# Patient Record
Sex: FEMALE | Race: WHITE | NOT HISPANIC OR LATINO | Employment: OTHER | URBAN - METROPOLITAN AREA
[De-identification: names, ages, dates, MRNs, and addresses within clinical notes are randomized per-mention and may not be internally consistent; named-entity substitution may affect disease eponyms.]

---

## 2023-03-01 LAB
ALANINE AMINOTRANSFERASE (SGPT) (U/L) IN SER/PLAS: 32 U/L (ref 7–45)
ALBUMIN (G/DL) IN SER/PLAS: 4.5 G/DL (ref 3.4–5)
ALKALINE PHOSPHATASE (U/L) IN SER/PLAS: 48 U/L (ref 33–136)
ANION GAP IN SER/PLAS: 15 MMOL/L (ref 10–20)
ASPARTATE AMINOTRANSFERASE (SGOT) (U/L) IN SER/PLAS: 20 U/L (ref 9–39)
BASOPHILS (10*3/UL) IN BLOOD BY AUTOMATED COUNT: 0.04 X10E9/L (ref 0–0.1)
BASOPHILS/100 LEUKOCYTES IN BLOOD BY AUTOMATED COUNT: 0.5 % (ref 0–2)
BILIRUBIN TOTAL (MG/DL) IN SER/PLAS: 0.5 MG/DL (ref 0–1.2)
CALCIDIOL (25 OH VITAMIN D3) (NG/ML) IN SER/PLAS: 83 NG/ML
CALCIUM (MG/DL) IN SER/PLAS: 10.6 MG/DL (ref 8.6–10.6)
CARBON DIOXIDE, TOTAL (MMOL/L) IN SER/PLAS: 27 MMOL/L (ref 21–32)
CHLORIDE (MMOL/L) IN SER/PLAS: 102 MMOL/L (ref 98–107)
CHOLESTEROL (MG/DL) IN SER/PLAS: 155 MG/DL (ref 0–199)
CHOLESTEROL IN HDL (MG/DL) IN SER/PLAS: 61.2 MG/DL
CHOLESTEROL/HDL RATIO: 2.5
CREATININE (MG/DL) IN SER/PLAS: 0.74 MG/DL (ref 0.5–1.05)
EOSINOPHILS (10*3/UL) IN BLOOD BY AUTOMATED COUNT: 0.1 X10E9/L (ref 0–0.4)
EOSINOPHILS/100 LEUKOCYTES IN BLOOD BY AUTOMATED COUNT: 1.3 % (ref 0–6)
ERYTHROCYTE DISTRIBUTION WIDTH (RATIO) BY AUTOMATED COUNT: 13.9 % (ref 11.5–14.5)
ERYTHROCYTE MEAN CORPUSCULAR HEMOGLOBIN CONCENTRATION (G/DL) BY AUTOMATED: 31.8 G/DL (ref 32–36)
ERYTHROCYTE MEAN CORPUSCULAR VOLUME (FL) BY AUTOMATED COUNT: 87 FL (ref 80–100)
ERYTHROCYTES (10*6/UL) IN BLOOD BY AUTOMATED COUNT: 5.02 X10E12/L (ref 4–5.2)
ESTIMATED AVERAGE GLUCOSE FOR HBA1C: 212 MG/DL
GFR FEMALE: 86 ML/MIN/1.73M2
GLUCOSE (MG/DL) IN SER/PLAS: 179 MG/DL (ref 74–99)
HEMATOCRIT (%) IN BLOOD BY AUTOMATED COUNT: 43.7 % (ref 36–46)
HEMOGLOBIN (G/DL) IN BLOOD: 13.9 G/DL (ref 12–16)
HEMOGLOBIN A1C/HEMOGLOBIN TOTAL IN BLOOD: 9 %
IMMATURE GRANULOCYTES/100 LEUKOCYTES IN BLOOD BY AUTOMATED COUNT: 0.3 % (ref 0–0.9)
LDL: 73 MG/DL (ref 0–99)
LEUKOCYTES (10*3/UL) IN BLOOD BY AUTOMATED COUNT: 7.7 X10E9/L (ref 4.4–11.3)
LYMPHOCYTES (10*3/UL) IN BLOOD BY AUTOMATED COUNT: 2.32 X10E9/L (ref 0.8–3)
LYMPHOCYTES/100 LEUKOCYTES IN BLOOD BY AUTOMATED COUNT: 30.2 % (ref 13–44)
MONOCYTES (10*3/UL) IN BLOOD BY AUTOMATED COUNT: 0.63 X10E9/L (ref 0.05–0.8)
MONOCYTES/100 LEUKOCYTES IN BLOOD BY AUTOMATED COUNT: 8.2 % (ref 2–10)
NEUTROPHILS (10*3/UL) IN BLOOD BY AUTOMATED COUNT: 4.58 X10E9/L (ref 1.6–5.5)
NEUTROPHILS/100 LEUKOCYTES IN BLOOD BY AUTOMATED COUNT: 59.5 % (ref 40–80)
NRBC (PER 100 WBCS) BY AUTOMATED COUNT: 0 /100 WBC (ref 0–0)
PLATELETS (10*3/UL) IN BLOOD AUTOMATED COUNT: 214 X10E9/L (ref 150–450)
POTASSIUM (MMOL/L) IN SER/PLAS: 4.7 MMOL/L (ref 3.5–5.3)
PROTEIN TOTAL: 6.9 G/DL (ref 6.4–8.2)
SODIUM (MMOL/L) IN SER/PLAS: 139 MMOL/L (ref 136–145)
THYROTROPIN (MIU/L) IN SER/PLAS BY DETECTION LIMIT <= 0.05 MIU/L: 0.11 MIU/L (ref 0.44–3.98)
THYROXINE (T4) FREE (NG/DL) IN SER/PLAS: 1.32 NG/DL (ref 0.78–1.48)
TRIGLYCERIDE (MG/DL) IN SER/PLAS: 102 MG/DL (ref 0–149)
UREA NITROGEN (MG/DL) IN SER/PLAS: 16 MG/DL (ref 6–23)
VLDL: 20 MG/DL (ref 0–40)

## 2023-04-11 LAB
ANION GAP IN SER/PLAS: 12 MMOL/L (ref 10–20)
CALCIUM (MG/DL) IN SER/PLAS: 9.6 MG/DL (ref 8.6–10.6)
CARBON DIOXIDE, TOTAL (MMOL/L) IN SER/PLAS: 26 MMOL/L (ref 21–32)
CHLORIDE (MMOL/L) IN SER/PLAS: 105 MMOL/L (ref 98–107)
CREATININE (MG/DL) IN SER/PLAS: 0.73 MG/DL (ref 0.5–1.05)
ERYTHROCYTE DISTRIBUTION WIDTH (RATIO) BY AUTOMATED COUNT: 14.1 % (ref 11.5–14.5)
ERYTHROCYTE MEAN CORPUSCULAR HEMOGLOBIN CONCENTRATION (G/DL) BY AUTOMATED: 31.6 G/DL (ref 32–36)
ERYTHROCYTE MEAN CORPUSCULAR VOLUME (FL) BY AUTOMATED COUNT: 88 FL (ref 80–100)
ERYTHROCYTES (10*6/UL) IN BLOOD BY AUTOMATED COUNT: 4.93 X10E12/L (ref 4–5.2)
GFR FEMALE: 87 ML/MIN/1.73M2
GLUCOSE (MG/DL) IN SER/PLAS: 204 MG/DL (ref 74–99)
HEMATOCRIT (%) IN BLOOD BY AUTOMATED COUNT: 43.4 % (ref 36–46)
HEMOGLOBIN (G/DL) IN BLOOD: 13.7 G/DL (ref 12–16)
LEUKOCYTES (10*3/UL) IN BLOOD BY AUTOMATED COUNT: 8.2 X10E9/L (ref 4.4–11.3)
NRBC (PER 100 WBCS) BY AUTOMATED COUNT: 0 /100 WBC (ref 0–0)
PLATELETS (10*3/UL) IN BLOOD AUTOMATED COUNT: 225 X10E9/L (ref 150–450)
POTASSIUM (MMOL/L) IN SER/PLAS: 4.2 MMOL/L (ref 3.5–5.3)
SODIUM (MMOL/L) IN SER/PLAS: 139 MMOL/L (ref 136–145)
UREA NITROGEN (MG/DL) IN SER/PLAS: 11 MG/DL (ref 6–23)

## 2023-04-17 ENCOUNTER — HOSPITAL ENCOUNTER (OUTPATIENT)
Dept: DATA CONVERSION | Facility: HOSPITAL | Age: 73
End: 2023-04-17
Attending: INTERNAL MEDICINE | Admitting: INTERNAL MEDICINE
Payer: MEDICARE

## 2023-04-17 DIAGNOSIS — Z79.84 LONG TERM (CURRENT) USE OF ORAL HYPOGLYCEMIC DRUGS: ICD-10-CM

## 2023-04-17 DIAGNOSIS — E66.9 OBESITY, UNSPECIFIED: ICD-10-CM

## 2023-04-17 DIAGNOSIS — I25.110 ATHEROSCLEROTIC HEART DISEASE OF NATIVE CORONARY ARTERY WITH UNSTABLE ANGINA PECTORIS (MULTI): ICD-10-CM

## 2023-04-17 DIAGNOSIS — J44.9 CHRONIC OBSTRUCTIVE PULMONARY DISEASE, UNSPECIFIED (MULTI): ICD-10-CM

## 2023-04-17 DIAGNOSIS — R07.89 OTHER CHEST PAIN: ICD-10-CM

## 2023-04-17 DIAGNOSIS — E11.51 TYPE 2 DIABETES MELLITUS WITH DIABETIC PERIPHERAL ANGIOPATHY WITHOUT GANGRENE (MULTI): ICD-10-CM

## 2023-04-17 DIAGNOSIS — Z90.710 ACQUIRED ABSENCE OF BOTH CERVIX AND UTERUS: ICD-10-CM

## 2023-04-17 DIAGNOSIS — Z87.891 PERSONAL HISTORY OF NICOTINE DEPENDENCE: ICD-10-CM

## 2023-04-17 DIAGNOSIS — I10 ESSENTIAL (PRIMARY) HYPERTENSION: ICD-10-CM

## 2023-04-17 DIAGNOSIS — I70.213 ATHEROSCLEROSIS OF NATIVE ARTERIES OF EXTREMITIES WITH INTERMITTENT CLAUDICATION, BILATERAL LEGS (CMS-HCC): ICD-10-CM

## 2023-04-17 DIAGNOSIS — E78.5 HYPERLIPIDEMIA, UNSPECIFIED: ICD-10-CM

## 2023-04-17 DIAGNOSIS — E03.9 HYPOTHYROIDISM, UNSPECIFIED: ICD-10-CM

## 2023-04-17 DIAGNOSIS — Z82.49 FAMILY HISTORY OF ISCHEMIC HEART DISEASE AND OTHER DISEASES OF THE CIRCULATORY SYSTEM: ICD-10-CM

## 2023-04-20 LAB
ATRIAL RATE: 72 BPM
P AXIS: 58 DEGREES
P OFFSET: 161 MS
P ONSET: 108 MS
PR INTERVAL: 198 MS
Q ONSET: 207 MS
QRS COUNT: 12 BEATS
QRS DURATION: 158 MS
QT INTERVAL: 458 MS
QTC CALCULATION(BAZETT): 501 MS
QTC FREDERICIA: 487 MS
R AXIS: -72 DEGREES
T AXIS: 14 DEGREES
T OFFSET: 436 MS
VENTRICULAR RATE: 72 BPM

## 2023-04-21 LAB — POC ACTIVATED CLOTTING TIME LOW RANGE: 259 SECONDS (ref 89–169)

## 2023-05-24 DIAGNOSIS — E11.9 TYPE 2 DIABETES MELLITUS WITHOUT COMPLICATION, WITHOUT LONG-TERM CURRENT USE OF INSULIN (MULTI): Primary | ICD-10-CM

## 2023-05-24 RX ORDER — BLOOD SUGAR DIAGNOSTIC
STRIP MISCELLANEOUS
Qty: 200 STRIP | Refills: 0 | Status: SHIPPED | OUTPATIENT
Start: 2023-05-24 | End: 2024-05-01 | Stop reason: SDUPTHER

## 2023-06-08 ENCOUNTER — APPOINTMENT (OUTPATIENT)
Dept: PRIMARY CARE | Facility: CLINIC | Age: 73
End: 2023-06-08

## 2023-08-03 ENCOUNTER — OFFICE VISIT (OUTPATIENT)
Dept: PRIMARY CARE | Facility: CLINIC | Age: 73
End: 2023-08-03
Payer: MEDICARE

## 2023-08-03 VITALS
OXYGEN SATURATION: 98 % | HEART RATE: 78 BPM | SYSTOLIC BLOOD PRESSURE: 140 MMHG | WEIGHT: 161 LBS | BODY MASS INDEX: 25.27 KG/M2 | HEIGHT: 67 IN | DIASTOLIC BLOOD PRESSURE: 70 MMHG

## 2023-08-03 DIAGNOSIS — E55.9 VITAMIN D DEFICIENCY: ICD-10-CM

## 2023-08-03 DIAGNOSIS — R10.11 RUQ PAIN: ICD-10-CM

## 2023-08-03 DIAGNOSIS — E03.9 HYPOTHYROIDISM, UNSPECIFIED TYPE: ICD-10-CM

## 2023-08-03 DIAGNOSIS — Z00.00 HEALTHCARE MAINTENANCE: Primary | ICD-10-CM

## 2023-08-03 DIAGNOSIS — Z91.89 AT RISK FOR IMPAIRED HEALTH MAINTENANCE: ICD-10-CM

## 2023-08-03 DIAGNOSIS — B37.9 YEAST INFECTION: ICD-10-CM

## 2023-08-03 PROBLEM — B96.89 ACUTE BACTERIAL BRONCHITIS: Status: RESOLVED | Noted: 2023-08-03 | Resolved: 2023-08-03

## 2023-08-03 PROBLEM — J03.90 TONSILLITIS: Status: RESOLVED | Noted: 2023-08-03 | Resolved: 2023-08-03

## 2023-08-03 PROBLEM — R40.0 DAYTIME SOMNOLENCE: Status: ACTIVE | Noted: 2023-08-03

## 2023-08-03 PROBLEM — R60.0 LEG EDEMA: Status: ACTIVE | Noted: 2023-08-03

## 2023-08-03 PROBLEM — D35.00 ADRENAL ADENOMA: Status: ACTIVE | Noted: 2023-08-03

## 2023-08-03 PROBLEM — B37.31 CANDIDAL VAGINITIS: Status: RESOLVED | Noted: 2023-08-03 | Resolved: 2023-08-03

## 2023-08-03 PROBLEM — R10.9 FLANK PAIN: Status: RESOLVED | Noted: 2023-08-03 | Resolved: 2023-08-03

## 2023-08-03 PROBLEM — R06.02 SOB (SHORTNESS OF BREATH): Status: RESOLVED | Noted: 2023-08-03 | Resolved: 2023-08-03

## 2023-08-03 PROBLEM — R07.89 CHEST PAIN, MIDSTERNAL: Status: RESOLVED | Noted: 2023-08-03 | Resolved: 2023-08-03

## 2023-08-03 PROBLEM — R25.2 MUSCLE CRAMPS: Status: ACTIVE | Noted: 2023-08-03

## 2023-08-03 PROBLEM — M54.50 LOW BACK PAIN: Status: RESOLVED | Noted: 2023-08-03 | Resolved: 2023-08-03

## 2023-08-03 PROBLEM — N13.30 HYDRONEPHROSIS: Status: ACTIVE | Noted: 2023-08-03

## 2023-08-03 PROBLEM — E83.52 HYPERCALCEMIA: Status: ACTIVE | Noted: 2023-08-03

## 2023-08-03 PROBLEM — J04.0 REFLUX LARYNGITIS: Status: RESOLVED | Noted: 2023-08-03 | Resolved: 2023-08-03

## 2023-08-03 PROBLEM — G89.29 CHRONIC ABDOMINAL PAIN: Status: ACTIVE | Noted: 2023-08-03

## 2023-08-03 PROBLEM — E78.5 HYPERLIPIDEMIA: Status: ACTIVE | Noted: 2023-08-03

## 2023-08-03 PROBLEM — M25.511 RIGHT SHOULDER PAIN: Status: RESOLVED | Noted: 2023-08-03 | Resolved: 2023-08-03

## 2023-08-03 PROBLEM — E11.9 DIABETES MELLITUS (MULTI): Status: ACTIVE | Noted: 2023-08-03

## 2023-08-03 PROBLEM — N39.0 RECURRENT URINARY TRACT INFECTION: Status: RESOLVED | Noted: 2023-08-03 | Resolved: 2023-08-03

## 2023-08-03 PROBLEM — L65.9 HAIR LOSS: Status: ACTIVE | Noted: 2023-08-03

## 2023-08-03 PROBLEM — J20.8 ACUTE BACTERIAL BRONCHITIS: Status: RESOLVED | Noted: 2023-08-03 | Resolved: 2023-08-03

## 2023-08-03 PROBLEM — K11.20 SIALOADENITIS: Status: RESOLVED | Noted: 2023-08-03 | Resolved: 2023-08-03

## 2023-08-03 PROBLEM — R31.9 HEMATURIA: Status: RESOLVED | Noted: 2023-08-03 | Resolved: 2023-08-03

## 2023-08-03 PROBLEM — R35.0 INCREASED URINARY FREQUENCY: Status: RESOLVED | Noted: 2023-08-03 | Resolved: 2023-08-03

## 2023-08-03 PROBLEM — Z86.0100 HISTORY OF COLON POLYPS: Status: ACTIVE | Noted: 2023-08-03

## 2023-08-03 PROBLEM — M54.16 LUMBAR RADICULOPATHY: Status: ACTIVE | Noted: 2023-08-03

## 2023-08-03 PROBLEM — N95.2 VAGINAL ATROPHY: Status: ACTIVE | Noted: 2023-08-03

## 2023-08-03 PROBLEM — L65.9 ALOPECIA: Status: ACTIVE | Noted: 2023-08-03

## 2023-08-03 PROBLEM — R53.83 FATIGUE: Status: ACTIVE | Noted: 2023-08-03

## 2023-08-03 PROBLEM — H81.10 BPPV (BENIGN PAROXYSMAL POSITIONAL VERTIGO): Status: ACTIVE | Noted: 2023-08-03

## 2023-08-03 PROBLEM — M25.551 HIP PAIN, RIGHT: Status: RESOLVED | Noted: 2023-08-03 | Resolved: 2023-08-03

## 2023-08-03 PROBLEM — R10.9 CHRONIC ABDOMINAL PAIN: Status: ACTIVE | Noted: 2023-08-03

## 2023-08-03 PROBLEM — F41.9 ANXIETY: Status: ACTIVE | Noted: 2023-08-03

## 2023-08-03 PROBLEM — B00.1 HERPES LABIALIS: Status: ACTIVE | Noted: 2023-08-03

## 2023-08-03 PROBLEM — I70.213 INTERMITTENT CLAUDICATION OF BOTH LOWER EXTREMITIES DUE TO ATHEROSCLEROSIS (CMS-HCC): Status: ACTIVE | Noted: 2023-08-03

## 2023-08-03 PROBLEM — K21.9 REFLUX LARYNGITIS: Status: RESOLVED | Noted: 2023-08-03 | Resolved: 2023-08-03

## 2023-08-03 PROBLEM — R30.0 DYSURIA: Status: RESOLVED | Noted: 2023-08-03 | Resolved: 2023-08-03

## 2023-08-03 PROBLEM — R14.0 ABDOMINAL BLOATING: Status: RESOLVED | Noted: 2023-08-03 | Resolved: 2023-08-03

## 2023-08-03 PROBLEM — R07.89 CHEST PAIN, ATYPICAL: Status: RESOLVED | Noted: 2023-08-03 | Resolved: 2023-08-03

## 2023-08-03 PROBLEM — R80.9 PROTEINURIA: Status: RESOLVED | Noted: 2023-08-03 | Resolved: 2023-08-03

## 2023-08-03 PROBLEM — I25.110 UNSTABLE ANGINA PECTORIS DUE TO CORONARY ARTERIOSCLEROSIS (MULTI): Status: ACTIVE | Noted: 2023-08-03

## 2023-08-03 PROBLEM — J44.1 COPD WITH ACUTE EXACERBATION (MULTI): Status: ACTIVE | Noted: 2023-08-03

## 2023-08-03 PROBLEM — Z86.010 HISTORY OF COLON POLYPS: Status: ACTIVE | Noted: 2023-08-03

## 2023-08-03 PROBLEM — N39.0 ACUTE UTI: Status: RESOLVED | Noted: 2023-08-03 | Resolved: 2023-08-03

## 2023-08-03 PROBLEM — R93.1 ELEVATED CORONARY ARTERY CALCIUM SCORE: Status: ACTIVE | Noted: 2023-08-03

## 2023-08-03 PROBLEM — R39.15 URINARY URGENCY: Status: RESOLVED | Noted: 2023-08-03 | Resolved: 2023-08-03

## 2023-08-03 PROCEDURE — 1159F MED LIST DOCD IN RCRD: CPT | Performed by: INTERNAL MEDICINE

## 2023-08-03 PROCEDURE — 3008F BODY MASS INDEX DOCD: CPT | Performed by: INTERNAL MEDICINE

## 2023-08-03 PROCEDURE — 3052F HG A1C>EQUAL 8.0%<EQUAL 9.0%: CPT | Performed by: INTERNAL MEDICINE

## 2023-08-03 PROCEDURE — 3077F SYST BP >= 140 MM HG: CPT | Performed by: INTERNAL MEDICINE

## 2023-08-03 PROCEDURE — 3078F DIAST BP <80 MM HG: CPT | Performed by: INTERNAL MEDICINE

## 2023-08-03 PROCEDURE — 1036F TOBACCO NON-USER: CPT | Performed by: INTERNAL MEDICINE

## 2023-08-03 PROCEDURE — 99214 OFFICE O/P EST MOD 30 MIN: CPT | Performed by: INTERNAL MEDICINE

## 2023-08-03 RX ORDER — FLUCONAZOLE 150 MG/1
150 TABLET ORAL ONCE
Qty: 3 TABLET | Refills: 3 | Status: SHIPPED | OUTPATIENT
Start: 2023-08-03 | End: 2023-08-03

## 2023-08-03 RX ORDER — LEVOTHYROXINE SODIUM 100 UG/1
TABLET ORAL
COMMUNITY
Start: 2022-05-02 | End: 2023-08-03 | Stop reason: SDUPTHER

## 2023-08-03 RX ORDER — CLOPIDOGREL BISULFATE 75 MG/1
TABLET ORAL
COMMUNITY
Start: 2023-04-17 | End: 2024-04-11

## 2023-08-03 RX ORDER — LEVOTHYROXINE SODIUM 100 UG/1
100 TABLET ORAL
Qty: 90 TABLET | Refills: 1 | Status: SHIPPED | OUTPATIENT
Start: 2023-08-03 | End: 2023-11-09 | Stop reason: SDUPTHER

## 2023-08-03 RX ORDER — ALBUTEROL SULFATE 90 UG/1
AEROSOL, METERED RESPIRATORY (INHALATION)
COMMUNITY
Start: 2023-04-13

## 2023-08-03 RX ORDER — FUROSEMIDE 20 MG/1
1 TABLET ORAL DAILY
COMMUNITY
Start: 2023-05-19 | End: 2023-10-24 | Stop reason: HOSPADM

## 2023-08-03 RX ORDER — ASPIRIN 81 MG/1
1 TABLET ORAL DAILY
COMMUNITY
Start: 2023-05-19 | End: 2024-05-29 | Stop reason: ALTCHOICE

## 2023-08-03 RX ORDER — MAGNESIUM 250 MG
1 TABLET ORAL DAILY
COMMUNITY
Start: 2023-05-19 | End: 2024-05-31 | Stop reason: WASHOUT

## 2023-08-03 RX ORDER — EMPAGLIFLOZIN 10 MG/1
1 TABLET, FILM COATED ORAL DAILY
COMMUNITY
Start: 2022-02-25 | End: 2023-09-11

## 2023-08-03 RX ORDER — ROSUVASTATIN CALCIUM 40 MG/1
1 TABLET, COATED ORAL DAILY
COMMUNITY
Start: 2022-02-22 | End: 2024-05-06

## 2023-08-03 RX ORDER — BUDESONIDE AND FORMOTEROL FUMARATE DIHYDRATE 160; 4.5 UG/1; UG/1
AEROSOL RESPIRATORY (INHALATION)
COMMUNITY
Start: 2016-03-22

## 2023-08-03 RX ORDER — TIOTROPIUM BROMIDE INHALATION SPRAY 3.12 UG/1
SPRAY, METERED RESPIRATORY (INHALATION)
COMMUNITY
Start: 2016-03-22

## 2023-08-03 RX ORDER — ACETAMINOPHEN 500 MG
2 TABLET ORAL DAILY
COMMUNITY
Start: 2017-06-09 | End: 2024-05-31 | Stop reason: WASHOUT

## 2023-08-03 RX ORDER — FLUCONAZOLE 150 MG/1
150 TABLET ORAL ONCE
COMMUNITY
End: 2023-08-03 | Stop reason: SDUPTHER

## 2023-08-03 ASSESSMENT — ENCOUNTER SYMPTOMS
DEPRESSION: 0
LOSS OF SENSATION IN FEET: 0
OCCASIONAL FEELINGS OF UNSTEADINESS: 1

## 2023-08-03 NOTE — PROGRESS NOTES
"Subjective   Patient ID: Mare Ching is a 72 y.o. female who presents for Follow-up.  HPI        Patient presents for follow-up overall doing well aside from some twinges on her right upper quadrant intermittently for months at times she describes it last seconds and goes away grade 0 out of 10 at present nothing makes better or worse  Denies nausea vomiting fever chills hematochezia melena hematemesis dysuria hematuria        Health Maintenance:      Colonoscopy: 2019      Mammogram: 2023      Pelvic/Pap:      Low dose chest CT:      Aorta duplex:      Optho:      Podiatry:        Vaccines:      Prevnar 20:      Prevnar 13:      Pneumovax 23:      Tdap:      Shingrix:      COVID:      Influenza:        ROS:      General: denies fever/chills/weight loss      Head: denies HA/trauma/masses/dizziness      Eyes: denies vision change/loss of vision/blurry vision/diplopia/eye pain      Ears: denies hearing loss/tinnitus/otalgia/otorrhea      Nose: denies nasal drainage/anosmia      Throat: denies dysphagia/odynophagia      Lymphatics: denies lymph node swelling      Cardiac: States he was getting left-sided chest discomfort earlier this year in April she told her cardiologist and that taking her back for cardiac stent now the symptoms are resolved denies CP/palpitations/orthopnea/PND      Pulmonary: denies dyspnea/cough/wheezing      GI: Intermittent right upper quadrant discomfort twinges none at present denies abd pain/n/v/diarrhea/melena/hematochezia/hematemesis      : denies dysuria/hematuria/change frequency      Genital: denies genital discharge/lesions      Skin: denies rashes/lesions/masses      MSK: denies weakness/swelling/edema/gait imbalance/pain      Neuro: denies paresthesias/seizures/dysarthria      Psych: denies depression/anxiety/suicidal or homicidal ideations            Objective   /70   Pulse 78   Ht 1.702 m (5' 7\")   Wt 73 kg (161 lb)   SpO2 98%   BMI 25.22 kg/m²      Physical " "Exam:     General: AO3, NAD     Head: atraumatic/NC     Eyes: EOMI/PERRLA. Negative APD     Ears: TM pearly gray, EAC clear. No lesions or erythema     Nose: symmetric nares, no discharge     Throat: trachea midline, uvula midline pink mucosa. No thyromegaly     Lymphatics: no cervical/supraclavicular/ant or posterior cervical adenopathy/axillary/inguinal adenopathy     Breast: not examined     Chest: no deformity or tenderness to palpation     Pulm: CTA b/l, no wheeze/rhonchi/rales. nonlabored     Cardiac: RRR +s1s2, no m/r/g.      GI: Negative Ventura's negative Rovsing negative McBurney no HSM soft, NT/ND. Normoactive Bsx4. No rebound/guarding.     Rectal: no examined     MSK: 5/5 strength UE LE. No edema/clubbing/cyanosis     Skin: no rashes/lesions     Vascular: 2+ palp DP PT radials b/l. Negative carotid bruit     Neuro: CNII-XII intact. No focal deficits. Reflexes 2/4 brachioradialis bicep tricep patellar achilles. Finger to nose intact.     Psych: appropriate mood/affect                    No results found for: \"BMPR1A\", \"CBCDIF\"      Assessment/Plan   Diagnoses and all orders for this visit:  Healthcare maintenance  -     Hemoglobin A1C; Future  -     Comprehensive Metabolic Panel; Future  -     Lipase; Future  -     Cancer Antigen 19-9; Future  -     CBC and Auto Differential; Future  -     Magnesium; Future  -     Vitamin D 25-Hydroxy,Total; Future  -     Iron and TIBC; Future  Yeast infection  -     fluconazole (Diflucan) 150 mg tablet; Take 1 tablet (150 mg) by mouth 1 time for 1 dose.  Hypothyroidism, unspecified type  -     levothyroxine (Synthroid, Levoxyl) 100 mcg tablet; Take 1 tablet (100 mcg) by mouth once daily in the morning. Take before meals.  RUQ pain  Comments:  intermittent muscle strain less likely cholecystitis hepatitis vs other  Orders:  -     US abdomen limited liver; Future  At risk for impaired health maintenance  -     CBC and Auto Differential; Future  Vitamin D deficiency  -     " Vitamin D 25-Hydroxy,Total; Future  Go to the ER for any severe recurrent abdominal pain or other concerning symptoms    Call and follow-up with cardiology as planned    Thank you for making appointment today Mare    Please follow-up 3 months         Marilu Weldon MA

## 2023-08-08 ENCOUNTER — LAB (OUTPATIENT)
Dept: LAB | Facility: LAB | Age: 73
End: 2023-08-08
Payer: MEDICARE

## 2023-08-08 DIAGNOSIS — Z91.89 AT RISK FOR IMPAIRED HEALTH MAINTENANCE: ICD-10-CM

## 2023-08-08 DIAGNOSIS — Z00.00 HEALTHCARE MAINTENANCE: ICD-10-CM

## 2023-08-08 DIAGNOSIS — E55.9 VITAMIN D DEFICIENCY: ICD-10-CM

## 2023-08-08 LAB
ALANINE AMINOTRANSFERASE (SGPT) (U/L) IN SER/PLAS: 27 U/L (ref 7–45)
ALBUMIN (G/DL) IN SER/PLAS: 4.6 G/DL (ref 3.4–5)
ALKALINE PHOSPHATASE (U/L) IN SER/PLAS: 46 U/L (ref 33–136)
ANION GAP IN SER/PLAS: 14 MMOL/L (ref 10–20)
ASPARTATE AMINOTRANSFERASE (SGOT) (U/L) IN SER/PLAS: 19 U/L (ref 9–39)
BASOPHILS (10*3/UL) IN BLOOD BY AUTOMATED COUNT: 0.04 X10E9/L (ref 0–0.1)
BASOPHILS/100 LEUKOCYTES IN BLOOD BY AUTOMATED COUNT: 0.6 % (ref 0–2)
BILIRUBIN TOTAL (MG/DL) IN SER/PLAS: 0.5 MG/DL (ref 0–1.2)
CALCIDIOL (25 OH VITAMIN D3) (NG/ML) IN SER/PLAS: 40 NG/ML
CALCIUM (MG/DL) IN SER/PLAS: 10 MG/DL (ref 8.6–10.6)
CANCER AG 19-9 (U/ML) IN SER/PLAS: 24.47 U/ML
CARBON DIOXIDE, TOTAL (MMOL/L) IN SER/PLAS: 26 MMOL/L (ref 21–32)
CHLORIDE (MMOL/L) IN SER/PLAS: 105 MMOL/L (ref 98–107)
CREATININE (MG/DL) IN SER/PLAS: 0.64 MG/DL (ref 0.5–1.05)
EOSINOPHILS (10*3/UL) IN BLOOD BY AUTOMATED COUNT: 0.13 X10E9/L (ref 0–0.4)
EOSINOPHILS/100 LEUKOCYTES IN BLOOD BY AUTOMATED COUNT: 2.1 % (ref 0–6)
ERYTHROCYTE DISTRIBUTION WIDTH (RATIO) BY AUTOMATED COUNT: 14.4 % (ref 11.5–14.5)
ERYTHROCYTE MEAN CORPUSCULAR HEMOGLOBIN CONCENTRATION (G/DL) BY AUTOMATED: 31.1 G/DL (ref 32–36)
ERYTHROCYTE MEAN CORPUSCULAR VOLUME (FL) BY AUTOMATED COUNT: 91 FL (ref 80–100)
ERYTHROCYTES (10*6/UL) IN BLOOD BY AUTOMATED COUNT: 4.78 X10E12/L (ref 4–5.2)
ESTIMATED AVERAGE GLUCOSE FOR HBA1C: 206 MG/DL
GFR FEMALE: >90 ML/MIN/1.73M2
GLUCOSE (MG/DL) IN SER/PLAS: 172 MG/DL (ref 74–99)
HEMATOCRIT (%) IN BLOOD BY AUTOMATED COUNT: 43.4 % (ref 36–46)
HEMOGLOBIN (G/DL) IN BLOOD: 13.5 G/DL (ref 12–16)
HEMOGLOBIN A1C/HEMOGLOBIN TOTAL IN BLOOD: 8.8 %
IMMATURE GRANULOCYTES/100 LEUKOCYTES IN BLOOD BY AUTOMATED COUNT: 0.3 % (ref 0–0.9)
IRON (UG/DL) IN SER/PLAS: 79 UG/DL (ref 35–150)
IRON BINDING CAPACITY (UG/DL) IN SER/PLAS: 414 UG/DL (ref 240–445)
IRON SATURATION (%) IN SER/PLAS: 19 % (ref 25–45)
LEUKOCYTES (10*3/UL) IN BLOOD BY AUTOMATED COUNT: 6.2 X10E9/L (ref 4.4–11.3)
LIPASE (U/L) IN SER/PLAS: 50 U/L (ref 9–82)
LYMPHOCYTES (10*3/UL) IN BLOOD BY AUTOMATED COUNT: 1.82 X10E9/L (ref 0.8–3)
LYMPHOCYTES/100 LEUKOCYTES IN BLOOD BY AUTOMATED COUNT: 29.4 % (ref 13–44)
MAGNESIUM (MG/DL) IN SER/PLAS: 2.35 MG/DL (ref 1.6–2.4)
MONOCYTES (10*3/UL) IN BLOOD BY AUTOMATED COUNT: 0.51 X10E9/L (ref 0.05–0.8)
MONOCYTES/100 LEUKOCYTES IN BLOOD BY AUTOMATED COUNT: 8.2 % (ref 2–10)
NEUTROPHILS (10*3/UL) IN BLOOD BY AUTOMATED COUNT: 3.67 X10E9/L (ref 1.6–5.5)
NEUTROPHILS/100 LEUKOCYTES IN BLOOD BY AUTOMATED COUNT: 59.4 % (ref 40–80)
NRBC (PER 100 WBCS) BY AUTOMATED COUNT: 0 /100 WBC (ref 0–0)
PLATELETS (10*3/UL) IN BLOOD AUTOMATED COUNT: 208 X10E9/L (ref 150–450)
POTASSIUM (MMOL/L) IN SER/PLAS: 4.4 MMOL/L (ref 3.5–5.3)
PROTEIN TOTAL: 7 G/DL (ref 6.4–8.2)
SODIUM (MMOL/L) IN SER/PLAS: 141 MMOL/L (ref 136–145)
UREA NITROGEN (MG/DL) IN SER/PLAS: 12 MG/DL (ref 6–23)

## 2023-08-08 PROCEDURE — 80053 COMPREHEN METABOLIC PANEL: CPT

## 2023-08-08 PROCEDURE — 82306 VITAMIN D 25 HYDROXY: CPT

## 2023-08-08 PROCEDURE — 86301 IMMUNOASSAY TUMOR CA 19-9: CPT

## 2023-08-08 PROCEDURE — 83690 ASSAY OF LIPASE: CPT

## 2023-08-08 PROCEDURE — 36415 COLL VENOUS BLD VENIPUNCTURE: CPT

## 2023-08-08 PROCEDURE — 83036 HEMOGLOBIN GLYCOSYLATED A1C: CPT

## 2023-08-08 PROCEDURE — 85025 COMPLETE CBC W/AUTO DIFF WBC: CPT

## 2023-08-08 PROCEDURE — 83550 IRON BINDING TEST: CPT

## 2023-08-08 PROCEDURE — 83540 ASSAY OF IRON: CPT

## 2023-08-08 PROCEDURE — 83735 ASSAY OF MAGNESIUM: CPT

## 2023-08-14 ENCOUNTER — TELEPHONE (OUTPATIENT)
Dept: PRIMARY CARE | Facility: CLINIC | Age: 73
End: 2023-08-14

## 2023-08-14 DIAGNOSIS — E11.9 TYPE 2 DIABETES MELLITUS WITHOUT COMPLICATION, WITHOUT LONG-TERM CURRENT USE OF INSULIN (MULTI): Primary | ICD-10-CM

## 2023-09-09 DIAGNOSIS — E11.9 TYPE 2 DIABETES MELLITUS WITHOUT COMPLICATION, UNSPECIFIED WHETHER LONG TERM INSULIN USE (MULTI): Primary | ICD-10-CM

## 2023-09-11 RX ORDER — EMPAGLIFLOZIN 10 MG/1
10 TABLET, FILM COATED ORAL DAILY
Qty: 30 TABLET | Refills: 2 | Status: SHIPPED | OUTPATIENT
Start: 2023-09-11 | End: 2023-11-09 | Stop reason: SDUPTHER

## 2023-09-14 NOTE — H&P
History & Physical Reviewed:   I have reviewed the History and Physical dated:  06-Apr-2023   History and Physical reviewed and relevant findings noted. Patient examined to review pertinent physical  findings.: No significant changes   Home Medications Reviewed: no changes noted   Allergies Reviewed: no changes noted       Airway/Sedation Assessment:  ·  Emotional Status calm   ·  Neurologic alert & oriented x 3   ·  Respiratory clear to auscultation   ·  Cardiovascular rhythm & rate regular   ·  GI/ soft, nontender     · Pulses present: Pedal Left, Pedal Right, Radial Left, Radial Right     ·  Mouth Opening OK yes   ·  Neck Flexibility OK yes   ·  Loose Teeth yes     Oropharyngeal Classification:          ·  Oropharyngeal Classification Class II   ·  ASA PS Classification ASA III   ·  Sedation Plan moderate sedation       ERAS (Enhanced Recovery After Surgery):  ·  ERAS Patient: no     Consent:   COVID-19 Consent:  ·  COVID-19 Risk Consent Surgeon has reviewed key risks related to the risk of abby COVID-19 and if they contract COVID-19 what the risks are.       Electronic Signatures:  Rick Villeda ( (Fellow))  (Signed 17-Apr-2023 11:24)   Authored: History & Physical Reviewed, Airway/Sedation,  ERAS, Consent, Note Completion      Last Updated: 17-Apr-2023 11:24 by Rick Villeda ( (Fellow))

## 2023-10-09 ENCOUNTER — TELEPHONE (OUTPATIENT)
Dept: CARDIOLOGY | Facility: CLINIC | Age: 73
End: 2023-10-09

## 2023-10-09 NOTE — TELEPHONE ENCOUNTER
Pt has been having off and on slight pain left arm and jaw pain.   Felt a little OFF last week.  Previous stent April 2023. Recently moved so has been very active

## 2023-10-10 PROBLEM — H25.13 NUCLEAR SCLEROTIC CATARACT OF BOTH EYES: Status: ACTIVE | Noted: 2021-07-20

## 2023-10-10 PROBLEM — I10 ESSENTIAL HYPERTENSION: Status: ACTIVE | Noted: 2019-05-30

## 2023-10-10 PROBLEM — E11.9 TYPE 2 DIABETES MELLITUS WITHOUT COMPLICATION, WITHOUT LONG-TERM CURRENT USE OF INSULIN (MULTI): Status: ACTIVE | Noted: 2018-07-18

## 2023-10-10 PROBLEM — E55.9 VITAMIN D DEFICIENCY: Status: ACTIVE | Noted: 2019-05-30

## 2023-10-10 RX ORDER — TRIAMCINOLONE ACETONIDE 1 MG/G
CREAM TOPICAL
COMMUNITY
Start: 2022-07-15

## 2023-10-10 RX ORDER — ECONAZOLE NITRATE 10 MG/G
CREAM TOPICAL
COMMUNITY
Start: 2019-03-27

## 2023-10-10 RX ORDER — HYDROGEN PEROXIDE 3 %
20 SOLUTION, NON-ORAL MISCELLANEOUS
COMMUNITY
End: 2023-10-24 | Stop reason: HOSPADM

## 2023-10-10 RX ORDER — OMEPRAZOLE 40 MG/1
1 CAPSULE, DELAYED RELEASE ORAL DAILY
COMMUNITY
Start: 2021-02-03 | End: 2023-10-24 | Stop reason: HOSPADM

## 2023-10-11 PROBLEM — I25.10 CAD IN NATIVE ARTERY: Status: ACTIVE | Noted: 2023-10-11

## 2023-10-11 RX ORDER — LANCETS
EACH MISCELLANEOUS
COMMUNITY

## 2023-10-12 ENCOUNTER — OFFICE VISIT (OUTPATIENT)
Dept: CARDIOLOGY | Facility: CLINIC | Age: 73
End: 2023-10-12
Payer: MEDICARE

## 2023-10-12 VITALS
HEART RATE: 66 BPM | WEIGHT: 160 LBS | OXYGEN SATURATION: 97 % | HEIGHT: 67 IN | BODY MASS INDEX: 25.11 KG/M2 | SYSTOLIC BLOOD PRESSURE: 142 MMHG | DIASTOLIC BLOOD PRESSURE: 78 MMHG

## 2023-10-12 DIAGNOSIS — I25.10 CAD IN NATIVE ARTERY: Primary | ICD-10-CM

## 2023-10-12 DIAGNOSIS — I25.110 UNSTABLE ANGINA PECTORIS DUE TO CORONARY ARTERIOSCLEROSIS (MULTI): Primary | ICD-10-CM

## 2023-10-12 DIAGNOSIS — R93.1 ELEVATED CORONARY ARTERY CALCIUM SCORE: ICD-10-CM

## 2023-10-12 PROCEDURE — 3078F DIAST BP <80 MM HG: CPT | Performed by: INTERNAL MEDICINE

## 2023-10-12 PROCEDURE — 3077F SYST BP >= 140 MM HG: CPT | Performed by: INTERNAL MEDICINE

## 2023-10-12 PROCEDURE — 93000 ELECTROCARDIOGRAM COMPLETE: CPT | Performed by: INTERNAL MEDICINE

## 2023-10-12 PROCEDURE — 1159F MED LIST DOCD IN RCRD: CPT | Performed by: INTERNAL MEDICINE

## 2023-10-12 PROCEDURE — 3052F HG A1C>EQUAL 8.0%<EQUAL 9.0%: CPT | Performed by: INTERNAL MEDICINE

## 2023-10-12 PROCEDURE — 1036F TOBACCO NON-USER: CPT | Performed by: INTERNAL MEDICINE

## 2023-10-12 PROCEDURE — 99214 OFFICE O/P EST MOD 30 MIN: CPT | Performed by: INTERNAL MEDICINE

## 2023-10-12 PROCEDURE — 3008F BODY MASS INDEX DOCD: CPT | Performed by: INTERNAL MEDICINE

## 2023-10-12 RX ORDER — METOPROLOL SUCCINATE 25 MG/1
25 TABLET, EXTENDED RELEASE ORAL DAILY
Qty: 30 TABLET | Refills: 0 | Status: SHIPPED | OUTPATIENT
Start: 2023-10-12 | End: 2023-11-12

## 2023-10-12 NOTE — PROGRESS NOTES
Subjective   Mare Ching is a 73 y.o. female.    Chief Complaint:  No chief complaint on file.    HPI    She presents with recurrence of her symptoms.  She is getting tightness in the upper chest which radiates into the neck and jaw.  She is getting it very regularly.  It is occurring with activities and at rest.  Does have shortness of breath.  Discomfort usually lasts for about 15 minutes.  She notes that she is under some stress as she is now moved to [facility].  Also sister had a stroke.  Her symptoms have been going on over the past few weeks.  She gets the discomfort multiple times a day.    In April 2023, she underwent cardiac catheterization angioplasty and stent placement of a 90% left anterior descending coronary artery stenosis. Since her catheterization she has had resolution of her anginal symptoms. No more chest pressure or chest heaviness. Her shortness of breath is significantly improved. No episodes of diaphoresis. She recalls having a lot of chest discomfort during the procedure. She is concerned about the spider veins in her legs. She feels fatigued.    Her diagnosis of coronary artery disease is based on a positive calcium score 589 consistent with the presence of moderate to extensive atherosclerotic coronary artery disease.    She had a stress thallium study performed in March 2022 which was negative for ischemia.     She has a history of hypertension. There is a positive family history of coronary artery disease in the mother had coronary artery bypass grafting ×3. She is a past smoker. She used to smoke 2 packs per day but quit about 20 years ago. She does have a history of hyperlipidemia.     Review of Systems   All other systems reviewed and are negative.      Objective   Constitutional:       Appearance: Not in distress.   Eyes:      Conjunctiva/sclera: Conjunctivae normal.   Pulmonary:      Breath sounds: Normal breath sounds.   Cardiovascular:      PMI at left  midclavicular line. Normal rate. Regular rhythm. Normal S1. Normal S2.       Murmurs: There is no murmur.      No gallop.    Pulses:     Intact distal pulses.   Edema:     Peripheral edema absent.   Abdominal:      General: Bowel sounds are normal.   Neurological:      Mental Status: Alert.         Lab Review:   Lab Results   Component Value Date    CHOL 155 03/01/2023    TRIG 102 03/01/2023    HDL 61.2 03/01/2023       Assessment/Plan     1.  Unstable angina.  Recurrence of her symptomatology.  We personally reviewed the cardiac catheterization done in April 2023.  This is a very complex lesion but did have a good angiographic outcome.  Given her symptoms we are concerned that she has had an issue with the stent.  Our plan is to proceed with cardiac catheterization with possible intervention.  The procedure was again discussed in detail with the patient.  She agrees to proceed.  We have restarted beta-blockers.    2.  Hypertension.  Blood pressures are mildly elevated but she is quite anxious on today's visit.    3.  Hyperlipidemia.  Most recent LDL 73.

## 2023-10-13 ENCOUNTER — LAB (OUTPATIENT)
Dept: LAB | Facility: LAB | Age: 73
End: 2023-10-13
Payer: MEDICARE

## 2023-10-13 DIAGNOSIS — R93.1 ELEVATED CORONARY ARTERY CALCIUM SCORE: ICD-10-CM

## 2023-10-13 DIAGNOSIS — I25.110 UNSTABLE ANGINA PECTORIS DUE TO CORONARY ARTERIOSCLEROSIS (MULTI): ICD-10-CM

## 2023-10-13 LAB
ANION GAP SERPL CALC-SCNC: 16 MMOL/L (ref 10–20)
BUN SERPL-MCNC: 11 MG/DL (ref 6–23)
CALCIUM SERPL-MCNC: 10 MG/DL (ref 8.6–10.6)
CHLORIDE SERPL-SCNC: 105 MMOL/L (ref 98–107)
CO2 SERPL-SCNC: 24 MMOL/L (ref 21–32)
CREAT SERPL-MCNC: 0.73 MG/DL (ref 0.5–1.05)
ERYTHROCYTE [DISTWIDTH] IN BLOOD BY AUTOMATED COUNT: 14.2 % (ref 11.5–14.5)
GFR SERPL CREATININE-BSD FRML MDRD: 87 ML/MIN/1.73M*2
GLUCOSE SERPL-MCNC: 136 MG/DL (ref 74–99)
HCT VFR BLD AUTO: 43.2 % (ref 36–46)
HGB BLD-MCNC: 13.4 G/DL (ref 12–16)
INR PPP: 1 (ref 0.9–1.1)
MCH RBC QN AUTO: 27.2 PG (ref 26–34)
MCHC RBC AUTO-ENTMCNC: 31 G/DL (ref 32–36)
MCV RBC AUTO: 88 FL (ref 80–100)
NRBC BLD-RTO: 0 /100 WBCS (ref 0–0)
PLATELET # BLD AUTO: 228 X10*3/UL (ref 150–450)
PMV BLD AUTO: 11.9 FL (ref 7.5–11.5)
POTASSIUM SERPL-SCNC: 4.1 MMOL/L (ref 3.5–5.3)
PROTHROMBIN TIME: 11.3 SECONDS (ref 9.8–12.8)
RBC # BLD AUTO: 4.92 X10*6/UL (ref 4–5.2)
SODIUM SERPL-SCNC: 141 MMOL/L (ref 136–145)
WBC # BLD AUTO: 7.7 X10*3/UL (ref 4.4–11.3)

## 2023-10-13 PROCEDURE — 85027 COMPLETE CBC AUTOMATED: CPT

## 2023-10-13 PROCEDURE — 80048 BASIC METABOLIC PNL TOTAL CA: CPT

## 2023-10-13 PROCEDURE — 85610 PROTHROMBIN TIME: CPT

## 2023-10-13 PROCEDURE — 36415 COLL VENOUS BLD VENIPUNCTURE: CPT

## 2023-10-23 ENCOUNTER — HOSPITAL ENCOUNTER (OUTPATIENT)
Facility: HOSPITAL | Age: 73
Setting detail: OUTPATIENT SURGERY
Discharge: HOME | End: 2023-10-23
Attending: INTERNAL MEDICINE | Admitting: INTERNAL MEDICINE
Payer: MEDICARE

## 2023-10-23 ENCOUNTER — HOSPITAL ENCOUNTER (OUTPATIENT)
Dept: CARDIOLOGY | Facility: HOSPITAL | Age: 73
Discharge: HOME | End: 2023-10-23
Payer: MEDICARE

## 2023-10-23 VITALS
BODY MASS INDEX: 24.53 KG/M2 | SYSTOLIC BLOOD PRESSURE: 128 MMHG | HEART RATE: 70 BPM | WEIGHT: 156.31 LBS | HEIGHT: 67 IN | OXYGEN SATURATION: 99 % | RESPIRATION RATE: 15 BRPM | TEMPERATURE: 97.5 F | DIASTOLIC BLOOD PRESSURE: 64 MMHG

## 2023-10-23 DIAGNOSIS — R93.1 ELEVATED CORONARY ARTERY CALCIUM SCORE: ICD-10-CM

## 2023-10-23 DIAGNOSIS — R93.1 ELEVATED CORONARY ARTERY CALCIUM SCORE: Primary | ICD-10-CM

## 2023-10-23 DIAGNOSIS — I25.110 UNSTABLE ANGINA PECTORIS DUE TO CORONARY ARTERIOSCLEROSIS (MULTI): ICD-10-CM

## 2023-10-23 DIAGNOSIS — I25.10 CAD IN NATIVE ARTERY: ICD-10-CM

## 2023-10-23 LAB — GLUCOSE BLD MANUAL STRIP-MCNC: 162 MG/DL (ref 74–99)

## 2023-10-23 PROCEDURE — 96373 THER/PROPH/DIAG INJ IA: CPT | Performed by: INTERNAL MEDICINE

## 2023-10-23 PROCEDURE — C1769 GUIDE WIRE: HCPCS | Performed by: INTERNAL MEDICINE

## 2023-10-23 PROCEDURE — 2500000005 HC RX 250 GENERAL PHARMACY W/O HCPCS: Performed by: INTERNAL MEDICINE

## 2023-10-23 PROCEDURE — 99153 MOD SED SAME PHYS/QHP EA: CPT | Performed by: INTERNAL MEDICINE

## 2023-10-23 PROCEDURE — 93010 ELECTROCARDIOGRAM REPORT: CPT | Performed by: INTERNAL MEDICINE

## 2023-10-23 PROCEDURE — 2550000001 HC RX 255 CONTRASTS: Performed by: INTERNAL MEDICINE

## 2023-10-23 PROCEDURE — 99152 MOD SED SAME PHYS/QHP 5/>YRS: CPT | Performed by: INTERNAL MEDICINE

## 2023-10-23 PROCEDURE — 93571 IV DOP VEL&/PRESS C FLO 1ST: CPT | Mod: LD | Performed by: INTERNAL MEDICINE

## 2023-10-23 PROCEDURE — 2720000007 HC OR 272 NO HCPCS: Performed by: INTERNAL MEDICINE

## 2023-10-23 PROCEDURE — 2500000004 HC RX 250 GENERAL PHARMACY W/ HCPCS (ALT 636 FOR OP/ED): Performed by: INTERNAL MEDICINE

## 2023-10-23 PROCEDURE — 93458 L HRT ARTERY/VENTRICLE ANGIO: CPT | Performed by: INTERNAL MEDICINE

## 2023-10-23 PROCEDURE — 93571 IV DOP VEL&/PRESS C FLO 1ST: CPT | Performed by: INTERNAL MEDICINE

## 2023-10-23 PROCEDURE — 7100000010 HC PHASE TWO TIME - EACH INCREMENTAL 1 MINUTE: Performed by: INTERNAL MEDICINE

## 2023-10-23 PROCEDURE — 93005 ELECTROCARDIOGRAM TRACING: CPT

## 2023-10-23 PROCEDURE — 2500000004 HC RX 250 GENERAL PHARMACY W/ HCPCS (ALT 636 FOR OP/ED): Performed by: NURSE PRACTITIONER

## 2023-10-23 PROCEDURE — 2780000003 HC OR 278 NO HCPCS: Performed by: INTERNAL MEDICINE

## 2023-10-23 PROCEDURE — 7100000009 HC PHASE TWO TIME - INITIAL BASE CHARGE: Performed by: INTERNAL MEDICINE

## 2023-10-23 PROCEDURE — C1887 CATHETER, GUIDING: HCPCS | Performed by: INTERNAL MEDICINE

## 2023-10-23 PROCEDURE — C1894 INTRO/SHEATH, NON-LASER: HCPCS | Performed by: INTERNAL MEDICINE

## 2023-10-23 PROCEDURE — 82947 ASSAY GLUCOSE BLOOD QUANT: CPT

## 2023-10-23 RX ORDER — LIDOCAINE HYDROCHLORIDE 20 MG/ML
INJECTION, SOLUTION INFILTRATION; PERINEURAL AS NEEDED
Status: DISCONTINUED | OUTPATIENT
Start: 2023-10-23 | End: 2023-10-23 | Stop reason: HOSPADM

## 2023-10-23 RX ORDER — SODIUM CHLORIDE 9 MG/ML
100 INJECTION, SOLUTION INTRAVENOUS CONTINUOUS
Status: DISCONTINUED | OUTPATIENT
Start: 2023-10-23 | End: 2023-10-24 | Stop reason: HOSPADM

## 2023-10-23 RX ORDER — AMLODIPINE BESYLATE 2.5 MG/1
2.5 TABLET ORAL DAILY
Qty: 30 TABLET | Refills: 1 | Status: SHIPPED | OUTPATIENT
Start: 2023-10-23 | End: 2023-10-23 | Stop reason: SDUPTHER

## 2023-10-23 RX ORDER — SODIUM CHLORIDE 9 MG/ML
1.5 INJECTION, SOLUTION INTRAVENOUS CONTINUOUS
Status: CANCELLED | OUTPATIENT
Start: 2023-10-23 | End: 2023-10-23

## 2023-10-23 RX ORDER — VERAPAMIL HYDROCHLORIDE 2.5 MG/ML
INJECTION, SOLUTION INTRAVENOUS AS NEEDED
Status: DISCONTINUED | OUTPATIENT
Start: 2023-10-23 | End: 2023-10-23 | Stop reason: HOSPADM

## 2023-10-23 RX ORDER — ACETAMINOPHEN 325 MG/1
650 TABLET ORAL EVERY 6 HOURS PRN
Status: CANCELLED | OUTPATIENT
Start: 2023-10-23

## 2023-10-23 RX ORDER — MIDAZOLAM HYDROCHLORIDE 1 MG/ML
INJECTION INTRAMUSCULAR; INTRAVENOUS AS NEEDED
Status: DISCONTINUED | OUTPATIENT
Start: 2023-10-23 | End: 2023-10-23 | Stop reason: HOSPADM

## 2023-10-23 RX ORDER — HEPARIN SODIUM 1000 [USP'U]/ML
INJECTION, SOLUTION INTRAVENOUS; SUBCUTANEOUS AS NEEDED
Status: DISCONTINUED | OUTPATIENT
Start: 2023-10-23 | End: 2023-10-23 | Stop reason: HOSPADM

## 2023-10-23 RX ORDER — NITROGLYCERIN 5 MG/ML
INJECTION, SOLUTION INTRAVENOUS AS NEEDED
Status: DISCONTINUED | OUTPATIENT
Start: 2023-10-23 | End: 2023-10-23 | Stop reason: HOSPADM

## 2023-10-23 RX ORDER — AMLODIPINE BESYLATE 2.5 MG/1
2.5 TABLET ORAL DAILY
Qty: 30 TABLET | Refills: 1 | Status: SHIPPED | OUTPATIENT
Start: 2023-10-23 | End: 2023-11-10 | Stop reason: SDUPTHER

## 2023-10-23 RX ORDER — NAPROXEN SODIUM 220 MG/1
81 TABLET, FILM COATED ORAL ONCE
Status: DISCONTINUED | OUTPATIENT
Start: 2023-10-23 | End: 2023-10-24 | Stop reason: HOSPADM

## 2023-10-23 RX ORDER — FENTANYL CITRATE 50 UG/ML
INJECTION, SOLUTION INTRAMUSCULAR; INTRAVENOUS AS NEEDED
Status: DISCONTINUED | OUTPATIENT
Start: 2023-10-23 | End: 2023-10-23 | Stop reason: HOSPADM

## 2023-10-23 ASSESSMENT — COLUMBIA-SUICIDE SEVERITY RATING SCALE - C-SSRS
1. IN THE PAST MONTH, HAVE YOU WISHED YOU WERE DEAD OR WISHED YOU COULD GO TO SLEEP AND NOT WAKE UP?: NO
6. HAVE YOU EVER DONE ANYTHING, STARTED TO DO ANYTHING, OR PREPARED TO DO ANYTHING TO END YOUR LIFE?: NO
2. HAVE YOU ACTUALLY HAD ANY THOUGHTS OF KILLING YOURSELF?: NO

## 2023-10-23 ASSESSMENT — PAIN SCALES - GENERAL
PAINLEVEL_OUTOF10: 6
PAINLEVEL_OUTOF10: 0 - NO PAIN
PAINLEVEL_OUTOF10: 0 - NO PAIN
PAINLEVEL_OUTOF10: 5 - MODERATE PAIN
PAINLEVEL_OUTOF10: 0 - NO PAIN
PAINLEVEL_OUTOF10: 0 - NO PAIN
PAINLEVEL_OUTOF10: 8
PAINLEVEL_OUTOF10: 0 - NO PAIN
PAINLEVEL_OUTOF10: 8
PAINLEVEL_OUTOF10: 0 - NO PAIN
PAINLEVEL_OUTOF10: 6
PAINLEVEL_OUTOF10: 5 - MODERATE PAIN
PAINLEVEL_OUTOF10: 0 - NO PAIN
PAINLEVEL_OUTOF10: 6

## 2023-10-23 NOTE — NURSING NOTE
Patient discharged to home, discharge instructions reviewed with patient who verbalized understanding including new medication amlodipine.  Written instructions provided for new medication as well.  Peripheral IV removed, final site assessment and vitals stable, patient discharged to home via wheelchair.

## 2023-10-23 NOTE — H&P
History and Physical   Pre Surgical Review (< 30 days)      History & Physical Reviewed    I have reviewed the History and Physical dated:  10/12/23   History and Physical reviewed and relevant findings noted. Patient examined to review pertinent physical  findings.: No significant changes   Home Medications Reviewed: no changes noted   Allergies Reviewed: no changes noted      Home Medications  Current Outpatient Medications   Medication Instructions    Accu-Chek Guide test strips strip USE TO TEST BLOOD SUGAR 3 TIMES DAILY    aspirin 81 mg EC tablet 1 tablet, oral, Daily    cholecalciferol (Vitamin D-3) 50 mcg (2,000 unit) capsule 2 capsules, oral, Daily    clopidogrel (Plavix) 75 mg tablet oral    econazole nitrate 1 % cream apply 1-2 times daily    esomeprazole (NEXIUM) 20 mg, oral, Daily RT    furosemide (Lasix) 20 mg tablet 1 tablet, oral, Daily    Jardiance 10 mg, oral, Daily    lancets misc miscellaneous, Use 3 times daily as directed    levothyroxine (SYNTHROID, LEVOXYL) 100 mcg, oral, Daily before breakfast    magnesium 250 mg tablet 1 tablet, oral, Daily    metoprolol succinate XL (TOPROL-XL) 25 mg, oral, Daily, Do not crush or chew.    omeprazole (PriLOSEC) 40 mg DR capsule 1 capsule, oral, Daily    rosuvastatin (Crestor) 40 mg tablet 1 tablet, oral, Daily    semaglutide (RYBELSUS) 3 mg, oral, Daily    Spiriva Respimat 2.5 mcg/actuation inhaler inhalation    Symbicort 160-4.5 mcg/actuation inhaler inhalation    triamcinolone (Kenalog) 0.1 % cream     Ventolin HFA 90 mcg/actuation inhaler         Allergies  Allergies   Allergen Reactions    Adhesive Tape-Silicones Other     blisters       Physical Exam  Physical Exam  Constitutional:       General: She is not in acute distress.  Cardiovascular:      Rate and Rhythm: Normal rate and regular rhythm.      Comments: + pulses all extremities.  Pulmonary:      Effort: Pulmonary effort is normal. No respiratory distress.      Comments: Clear  bilaterally.  Abdominal:      General: Bowel sounds are normal.   Neurological:      General: No focal deficit present.      Mental Status: She is alert and oriented to person, place, and time.   Psychiatric:         Mood and Affect: Mood normal.         Behavior: Behavior normal.          Airway/Sedation Assessment    ·  Mouth Opening OK yes      Neck Flexibility OK yes      Loose Teeth No   ·  Oropharyngeal Classification Grade III   ·  ASA PS Classification ASA III - Patient with severe systemic disease       Sedation Plan Moderate     Risks, benefits, and alternatives discussed with patient.     ERAS (Enhanced Recovery After Surgery):  ·  ERAS Patient: No      Consent:   COVID-19 Consent:  ·  COVID-19 Risk Consent Surgeon has reviewed key risks related to the risk of abby COVID-19 and if they contract COVID-19 what the risks are.     Maria Ines Rolle, APRN-CNP

## 2023-11-09 ENCOUNTER — OFFICE VISIT (OUTPATIENT)
Dept: PRIMARY CARE | Facility: CLINIC | Age: 73
End: 2023-11-09
Payer: MEDICARE

## 2023-11-09 ENCOUNTER — LAB (OUTPATIENT)
Dept: LAB | Facility: LAB | Age: 73
End: 2023-11-09
Payer: MEDICARE

## 2023-11-09 VITALS
OXYGEN SATURATION: 97 % | BODY MASS INDEX: 24.64 KG/M2 | DIASTOLIC BLOOD PRESSURE: 74 MMHG | WEIGHT: 157 LBS | HEART RATE: 67 BPM | SYSTOLIC BLOOD PRESSURE: 136 MMHG | HEIGHT: 67 IN

## 2023-11-09 DIAGNOSIS — E03.9 HYPOTHYROIDISM, UNSPECIFIED TYPE: ICD-10-CM

## 2023-11-09 DIAGNOSIS — I25.110 UNSTABLE ANGINA PECTORIS DUE TO CORONARY ARTERIOSCLEROSIS (MULTI): ICD-10-CM

## 2023-11-09 DIAGNOSIS — E11.9 TYPE 2 DIABETES MELLITUS WITHOUT COMPLICATION, UNSPECIFIED WHETHER LONG TERM INSULIN USE (MULTI): ICD-10-CM

## 2023-11-09 DIAGNOSIS — I25.10 CAD IN NATIVE ARTERY: Primary | ICD-10-CM

## 2023-11-09 LAB
EST. AVERAGE GLUCOSE BLD GHB EST-MCNC: 194 MG/DL
HBA1C MFR BLD: 8.4 %

## 2023-11-09 PROCEDURE — 3052F HG A1C>EQUAL 8.0%<EQUAL 9.0%: CPT | Performed by: INTERNAL MEDICINE

## 2023-11-09 PROCEDURE — 1159F MED LIST DOCD IN RCRD: CPT | Performed by: INTERNAL MEDICINE

## 2023-11-09 PROCEDURE — 1126F AMNT PAIN NOTED NONE PRSNT: CPT | Performed by: INTERNAL MEDICINE

## 2023-11-09 PROCEDURE — 1036F TOBACCO NON-USER: CPT | Performed by: INTERNAL MEDICINE

## 2023-11-09 PROCEDURE — 3078F DIAST BP <80 MM HG: CPT | Performed by: INTERNAL MEDICINE

## 2023-11-09 PROCEDURE — 83036 HEMOGLOBIN GLYCOSYLATED A1C: CPT

## 2023-11-09 PROCEDURE — 3008F BODY MASS INDEX DOCD: CPT | Performed by: INTERNAL MEDICINE

## 2023-11-09 PROCEDURE — 99214 OFFICE O/P EST MOD 30 MIN: CPT | Performed by: INTERNAL MEDICINE

## 2023-11-09 PROCEDURE — 3075F SYST BP GE 130 - 139MM HG: CPT | Performed by: INTERNAL MEDICINE

## 2023-11-09 PROCEDURE — 36415 COLL VENOUS BLD VENIPUNCTURE: CPT

## 2023-11-09 RX ORDER — LEVOTHYROXINE SODIUM 100 UG/1
100 TABLET ORAL
Qty: 90 TABLET | Refills: 1 | Status: SHIPPED | OUTPATIENT
Start: 2023-11-09 | End: 2024-05-01 | Stop reason: SDUPTHER

## 2023-11-09 NOTE — PROGRESS NOTES
Subjective   Patient ID: Mare Ching is a 73 y.o. female who presents for Follow-up, Medication Problem (Would like to discuss increasing her jardiance  10mg.), and Med Refill.  HPI  female history COPD tobacco abuse diabetes hypothyroidism muscle cramps moderate CAD. UTI anxiety unstable angina s/p cath 4/2023 PCI, cath 10/2023 .889 ffr LAD lesion    Patient presents today for follow-up she is mainly very frustrated upset because she feels like she should have had a repeat stent done on her lesion in the LAD she occasionally gets some symptoms of the left jaw pain left arm pain still not at present grade 0 out of 10 has been occurring for months but better at present she states she can see cardiology tomorrow to talk further about this  Occasionally gets leg cramps with statin use  Denies overt chest pain dyspnea nausea vomiting diaphoresis              Health Maintenance:      Colonoscopy: 2019      Mammogram: 2023      Pelvic/Pap:      Low dose chest CT:      Aorta duplex:      Optho:      Podiatry:        Vaccines:      Prevnar 20:      Prevnar 13:      Pneumovax 23:      Tdap:      Shingrix:      COVID:      Influenza:        ROS:      General: denies fever/chills/weight loss      Head: denies HA/trauma/masses/dizziness      Eyes: denies vision change/loss of vision/blurry vision/diplopia/eye pain      Ears: denies hearing loss/tinnitus/otalgia/otorrhea      Nose: denies nasal drainage/anosmia      Throat: denies dysphagia/odynophagia      Lymphatics: denies lymph node swelling      Cardiac: denies CP/palpitations/orthopnea/PND      Pulmonary: denies dyspnea/cough/wheezing      GI: denies abd pain/n/v/diarrhea/melena/hematochezia/hematemesis      : denies dysuria/hematuria/change frequency      Genital: denies genital discharge/lesions      Skin: denies rashes/lesions/masses      MSK: Intermittent left jaw pain left arm pain not present occasional leg cramps with statin denies  "weakness/swelling/edema/gait imbalance/pain      Neuro: denies paresthesias/seizures/dysarthria      Psych: denies depression/anxiety/suicidal or homicidal ideations            Objective   /74   Pulse 67   Ht 1.702 m (5' 7\")   Wt 71.2 kg (157 lb)   SpO2 97%   BMI 24.59 kg/m²      Physical Exam:     General: AO3, NAD     Head: atraumatic/NC     Eyes: EOMI/PERRLA. Negative APD     Ears: TM pearly gray, EAC clear. No lesions or erythema     Nose: symmetric nares, no discharge     Throat: trachea midline, uvula midline pink mucosa. No thyromegaly     Lymphatics: no cervical/supraclavicular/ant or posterior cervical adenopathy/axillary/inguinal adenopathy     Breast: not examined     Chest: no deformity or tenderness to palpation     Pulm: CTA b/l, no wheeze/rhonchi/rales. nonlabored     Cardiac: RRR +s1s2, no m/r/g.      GI: soft, NT/ND. Normoactive Bsx4. No rebound/guarding.     Rectal: no examined     MSK: 5/5 strength UE LE. No edema/clubbing/cyanosis     Skin: no rashes/lesions     Vascular: 2+ palp DP PT radials b/l. Negative carotid bruit     Neuro: CNII-XII intact. No focal deficits. Reflexes 2/4 brachioradialis bicep tricep patellar achilles. Finger to nose intact.     Psych: Somewhat frustrated countenance                    No results found for: \"BMPR1A\", \"CBCDIF\"      Assessment/Plan   Diagnoses and all orders for this visit:  CAD in native artery  Hypothyroidism, unspecified type  -     levothyroxine (Synthroid, Levoxyl) 100 mcg tablet; Take 1 tablet (100 mcg) by mouth once daily in the morning. Take before meals.  Type 2 diabetes mellitus without complication, unspecified whether long term insulin use (CMS/[facility])  -     empagliflozin (Jardiance) 10 mg; Take 1 tablet (10 mg) by mouth once daily.  -     Hemoglobin A1C; Future  Unstable angina pectoris due to coronary arteriosclerosis (CMS/[facility])    Call and follow-up with cardiology tomorrow as planned for further recommendations  Call 911 go to the ER " for any recurrent chest pain jaw pain arm pain or cardiac equivalents continue medical therapy for now as per cardiology    Await A1c results if still above 7% would recommend increasing Jardiance to 25 mg a day    Screening blood work due March thousand 24    Thank you for making appointment today Mare    Please follow-up 3 months       Marilu Weldon MA

## 2023-11-10 ENCOUNTER — OFFICE VISIT (OUTPATIENT)
Dept: CARDIOLOGY | Facility: CLINIC | Age: 73
End: 2023-11-10
Payer: MEDICARE

## 2023-11-10 VITALS
SYSTOLIC BLOOD PRESSURE: 140 MMHG | HEART RATE: 60 BPM | BODY MASS INDEX: 24.71 KG/M2 | HEIGHT: 67 IN | WEIGHT: 157.4 LBS | DIASTOLIC BLOOD PRESSURE: 68 MMHG | OXYGEN SATURATION: 96 %

## 2023-11-10 DIAGNOSIS — I25.10 CAD IN NATIVE ARTERY: ICD-10-CM

## 2023-11-10 DIAGNOSIS — E78.2 MIXED HYPERLIPIDEMIA: ICD-10-CM

## 2023-11-10 DIAGNOSIS — I10 ESSENTIAL HYPERTENSION: ICD-10-CM

## 2023-11-10 DIAGNOSIS — I70.213 INTERMITTENT CLAUDICATION OF BOTH LOWER EXTREMITIES DUE TO ATHEROSCLEROSIS (CMS-HCC): ICD-10-CM

## 2023-11-10 DIAGNOSIS — I25.110 UNSTABLE ANGINA PECTORIS DUE TO CORONARY ARTERIOSCLEROSIS (MULTI): Primary | ICD-10-CM

## 2023-11-10 PROCEDURE — 3052F HG A1C>EQUAL 8.0%<EQUAL 9.0%: CPT | Performed by: INTERNAL MEDICINE

## 2023-11-10 PROCEDURE — 3078F DIAST BP <80 MM HG: CPT | Performed by: INTERNAL MEDICINE

## 2023-11-10 PROCEDURE — 99214 OFFICE O/P EST MOD 30 MIN: CPT | Performed by: INTERNAL MEDICINE

## 2023-11-10 PROCEDURE — 3008F BODY MASS INDEX DOCD: CPT | Performed by: INTERNAL MEDICINE

## 2023-11-10 PROCEDURE — 1126F AMNT PAIN NOTED NONE PRSNT: CPT | Performed by: INTERNAL MEDICINE

## 2023-11-10 PROCEDURE — 1159F MED LIST DOCD IN RCRD: CPT | Performed by: INTERNAL MEDICINE

## 2023-11-10 PROCEDURE — 3077F SYST BP >= 140 MM HG: CPT | Performed by: INTERNAL MEDICINE

## 2023-11-10 PROCEDURE — 1036F TOBACCO NON-USER: CPT | Performed by: INTERNAL MEDICINE

## 2023-11-10 PROCEDURE — 93000 ELECTROCARDIOGRAM COMPLETE: CPT | Performed by: INTERNAL MEDICINE

## 2023-11-10 ASSESSMENT — ENCOUNTER SYMPTOMS: DYSPNEA ON EXERTION: 1

## 2023-11-10 NOTE — PROGRESS NOTES
Subjective   Mare Ching is a 73 y.o. female.    Chief Complaint:  Follow-up cardiac catheterization.    HPI    She is here for follow-up of her cardiac catheterization.  Continues to get angina on an occasional basis.  Gets tightness in the chest with radiation to the neck.  Very distressed over her cardiac catheterization.    Cardiac catheterization performed on October 23, 2023 showed a proximal 50% left anterior descending coronary artery stenosis.  This was not significant by FFR studies.  Mild disease in the mid to distal left anterior descending coronary artery the circumflex had mild disease.  Medical therapy was recommended.    She presents with recurrence of her symptoms.  She is getting tightness in the upper chest which radiates into the neck and jaw.  She is getting it very regularly.  It is occurring with activities and at rest.  Does have shortness of breath.  Discomfort usually lasts for about 15 minutes.  She notes that she is under some stress as she is now moved to [facility].  Also sister had a stroke.  Her symptoms have been going on over the past few weeks.  She gets the discomfort multiple times a day.     In April 2023, she underwent cardiac catheterization angioplasty and stent placement of a 90% left anterior descending coronary artery stenosis. Since her catheterization she has had resolution of her anginal symptoms. No more chest pressure or chest heaviness. Her shortness of breath is significantly improved. No episodes of diaphoresis. She recalls having a lot of chest discomfort during the procedure. She is concerned about the spider veins in her legs. She feels fatigued.     Her diagnosis of coronary artery disease is based on a positive calcium score 589 consistent with the presence of moderate to extensive atherosclerotic coronary artery disease.     She had a stress thallium study performed in March 2022 which was negative for ischemia.      She has a history of  "hypertension. There is a positive family history of coronary artery disease in the mother had coronary artery bypass grafting ×3. She is a past smoker. She used to smoke 2 packs per day but quit about 20 years ago. She does have a history of hyperlipidemia.     Allergies  Medication    · Surgical TAPE   Recorded By: Ingrid Miranda; 2/19/2020 12:50:40 PM     Family History  Mother    · Family history of diabetes mellitus (V18.0) (Z83.3)   · Family history of malignant neoplasm (V16.9) (Z80.9)   · Family history of pancreatic cancer (V16.0) (Z80.0)  Father    · Family history of malignant neoplasm (V16.9) (Z80.9)   · Family history of malignant neoplasm of urinary bladder (V16.52) (Z80.52)   · Family history of Recurrent strokes  Other    · Family history of cardiac disorder (V17.49) (Z82.49)     Social History  Problems    · Former smoker (V15.82) (Z87.891)   · QUIT 24 YEARS AGO   · No drug use   · Retired from employment   · Social alcohol use (Z78.9)             Review of Systems   Cardiovascular:  Positive for chest pain and dyspnea on exertion.   All other systems reviewed and are negative.      Visit Vitals  /68 (BP Location: Left arm, Patient Position: Sitting, BP Cuff Size: Adult)   Pulse 60   Ht 1.702 m (5' 7\")   Wt 71.4 kg (157 lb 6.4 oz)   SpO2 96%   BMI 24.65 kg/m²   Smoking Status Former   BSA 1.84 m²        Objective     Constitutional:       Appearance: Not in distress.   Neck:      Vascular: JVD normal.   Pulmonary:      Breath sounds: Normal breath sounds.   Cardiovascular:      Normal rate. Regular rhythm. Normal S1. Normal S2.       Murmurs: There is no murmur.      No gallop.    Pulses:     Intact distal pulses.   Edema:     Peripheral edema absent.   Abdominal:      General: There is no distension.      Palpations: Abdomen is soft.   Neurological:      Mental Status: Alert.         Lab Review:   Lab Results   Component Value Date     10/13/2023    K 4.1 10/13/2023     " 10/13/2023    CO2 24 10/13/2023    BUN 11 10/13/2023    CREATININE 0.73 10/13/2023    GLUCOSE 136 (H) 10/13/2023    CALCIUM 10.0 10/13/2023     Lab Results   Component Value Date    CHOL 155 03/01/2023    TRIG 102 03/01/2023    HDL 61.2 03/01/2023       Assessment:    1.  Coronary disease.  Patient has stable anginal symptoms.  We did personally review the cardiac catheterization results and angiograms with the patient.  The left anterior descending has a smooth area of about 50% stenosis in the proximal segment.  This was not significant by FFR studies and therefore no intervention was performed.  We discussed these findings with the patient.  We feel overall that she is at low risk for myocardial infarction.  She is on appropriate medical management.  Options for the future include stenting of the proximal anterior descending versus left internal mammary graft to the anterior descending.  This was also discussed in detail with the patient.    2.  Hypertension.  Blood pressures are elevated but she is extremely anxious and upset over her catheterization.    3.  Hyperlipidemia.  She is currently on statin therapy.    We will see her in follow-up in 6 months.  Should her anginal symptoms become worse she is to call us.

## 2023-11-12 RX ORDER — METOPROLOL SUCCINATE 25 MG/1
25 TABLET, EXTENDED RELEASE ORAL DAILY
Qty: 30 TABLET | Refills: 3 | Status: SHIPPED | OUTPATIENT
Start: 2023-11-12 | End: 2024-11-11

## 2023-11-12 RX ORDER — METOPROLOL SUCCINATE 25 MG/1
25 TABLET, EXTENDED RELEASE ORAL DAILY
Qty: 30 TABLET | Refills: 3 | Status: SHIPPED | OUTPATIENT
Start: 2023-11-12 | End: 2024-05-29 | Stop reason: WASHOUT

## 2023-11-12 RX ORDER — AMLODIPINE BESYLATE 2.5 MG/1
2.5 TABLET ORAL DAILY
Qty: 30 TABLET | Refills: 3 | Status: SHIPPED | OUTPATIENT
Start: 2023-11-12 | End: 2024-04-11

## 2023-11-18 LAB
ATRIAL RATE: 74 BPM
P AXIS: 22 DEGREES
P OFFSET: 166 MS
P ONSET: 116 MS
PR INTERVAL: 184 MS
Q ONSET: 208 MS
QRS COUNT: 12 BEATS
QRS DURATION: 128 MS
QT INTERVAL: 486 MS
QTC CALCULATION(BAZETT): 539 MS
QTC FREDERICIA: 521 MS
R AXIS: -67 DEGREES
T AXIS: 194 DEGREES
T OFFSET: 451 MS
VENTRICULAR RATE: 74 BPM

## 2023-11-27 ENCOUNTER — TELEPHONE (OUTPATIENT)
Dept: PRIMARY CARE | Facility: CLINIC | Age: 73
End: 2023-11-27

## 2023-11-27 DIAGNOSIS — B96.89 BACTERIAL SINUSITIS: Primary | ICD-10-CM

## 2023-11-27 DIAGNOSIS — J32.9 BACTERIAL SINUSITIS: Primary | ICD-10-CM

## 2023-11-27 DIAGNOSIS — E11.9 TYPE 2 DIABETES MELLITUS WITHOUT COMPLICATION, UNSPECIFIED WHETHER LONG TERM INSULIN USE (MULTI): ICD-10-CM

## 2023-11-27 RX ORDER — AZITHROMYCIN 250 MG/1
TABLET, FILM COATED ORAL
Qty: 6 TABLET | Refills: 2 | Status: SHIPPED | OUTPATIENT
Start: 2023-11-27 | End: 2023-12-02

## 2023-11-27 NOTE — TELEPHONE ENCOUNTER
Result Communication    Resulted Orders   Hemoglobin A1C   Result Value Ref Range    Hemoglobin A1C 8.4 (H) see below %    Estimated Average Glucose 194 Not Established mg/dL    Narrative    Diagnosis of Diabetes-Adults  Non-Diabetic: < or = 5.6%  Increased risk for developing diabetes: 5.7-6.4%  Diagnostic of diabetes: > or = 6.5%    Monitoring of Diabetes  Age (y)....................... Therapeutic Goal (%)  Adults: >18.........................<7.0  Pediatrics: 13-18...................<7.5  Pediatrics: 7-12....................<8.0  Pediatrics: 0-6..................... 7.5-8.5    American Diabetes Association. Diabetes Care 33(S1), Jan 2010           5:45 PM  10 minutes time spent discussing patient results and plan of care  Called patient advise her A1c improving 8.4 from 8.8 goal is less than 7% she states she just filled the Jardiance today therefore I told her to take 20 mg a day of Jardiance recheck A1c in 3 months if still uncontrolled we will go up to 25 mg a day Jardiance    She went on to tell me getting a little bit lightheaded over the last 2 months intermittently with dizziness she told her heart doctor who said to check her blood pressure blood pressure is good she does feel like it is almost a congested sensation in her head with rhinorrhea intermittently it is a little worse since having her heart cath medicines ordered had episode of epistaxis this is likely bacterial versus viral sinusitis  Take Z-Héctor clear fluids  ER for any worsening symptoms such as severe uncontrolled dizziness if no improvement over the next couple weeks would then need MRI brain and further evaluation and an office appointment

## 2023-12-06 ENCOUNTER — TELEPHONE VISIT (OUTPATIENT)
Dept: PRIMARY CARE | Facility: CLINIC | Age: 73
End: 2023-12-06
Payer: MEDICARE

## 2023-12-06 ENCOUNTER — TELEPHONE (OUTPATIENT)
Dept: PRIMARY CARE | Facility: CLINIC | Age: 73
End: 2023-12-06

## 2023-12-06 DIAGNOSIS — R26.0: ICD-10-CM

## 2023-12-06 DIAGNOSIS — R42 DIZZINESS: Primary | ICD-10-CM

## 2023-12-06 PROCEDURE — 99441 PR PHYS/QHP TELEPHONE EVALUATION 5-10 MIN: CPT | Performed by: INTERNAL MEDICINE

## 2023-12-06 NOTE — PROGRESS NOTES
Called patient, 5 minutes time spent reviewing symptoms plan  Telephone bill submitted      Patient states she has persistent lightheaded dizziness sensation for 2 months grade 5 out of 10 nothing makes better took a Z-Héctor sometimes worse when she walks she is a little bit of an ataxic unsteady gait  Has a history she states of vertigo unclear cause, some recurrent vertigo  Denies headaches nausea vomiting neck pain fever chills vision change diplopia focal weakness paresthesia  Worsening dizziness occasional vertigo possibly uncontrolled BPPV less likely mass posterior CVA versus other  Questionable possible materials in body that aren't MRI compatible therefore CT ordered and concern for claustrophobia  ENT follow-up likely needs ENG VNG  Advised patient best course of action Go to the ER for any persistent or worsening symptoms for evaluation   She states her symptoms started before her cardiac cath and new medicines were added to her regimen  Advised patient schedule office follow-up with me to be seen within the next 2 to 4 weeks for reevaluation thank you

## 2023-12-06 NOTE — TELEPHONE ENCOUNTER
Says the medication you gave for lightheadedness didn't help   asking if you want to order a CT  [phone]

## 2023-12-11 ENCOUNTER — LAB (OUTPATIENT)
Dept: LAB | Facility: LAB | Age: 73
End: 2023-12-11
Payer: MEDICARE

## 2023-12-11 DIAGNOSIS — R26.0: ICD-10-CM

## 2023-12-11 DIAGNOSIS — E11.9 TYPE 2 DIABETES MELLITUS WITHOUT COMPLICATION, UNSPECIFIED WHETHER LONG TERM INSULIN USE (MULTI): ICD-10-CM

## 2023-12-11 DIAGNOSIS — R42 DIZZINESS: ICD-10-CM

## 2023-12-11 LAB
CREAT SERPL-MCNC: 0.73 MG/DL (ref 0.5–1.05)
EST. AVERAGE GLUCOSE BLD GHB EST-MCNC: 189 MG/DL
GFR SERPL CREATININE-BSD FRML MDRD: 87 ML/MIN/1.73M*2
HBA1C MFR BLD: 8.2 %

## 2023-12-11 PROCEDURE — 36415 COLL VENOUS BLD VENIPUNCTURE: CPT

## 2023-12-11 PROCEDURE — 82565 ASSAY OF CREATININE: CPT

## 2023-12-11 PROCEDURE — 83036 HEMOGLOBIN GLYCOSYLATED A1C: CPT

## 2023-12-12 ENCOUNTER — APPOINTMENT (OUTPATIENT)
Dept: RADIOLOGY | Facility: HOSPITAL | Age: 73
End: 2023-12-12
Payer: MEDICARE

## 2023-12-15 ENCOUNTER — ANCILLARY PROCEDURE (OUTPATIENT)
Dept: RADIOLOGY | Facility: CLINIC | Age: 73
End: 2023-12-15
Payer: MEDICARE

## 2023-12-15 DIAGNOSIS — R42 DIZZINESS: ICD-10-CM

## 2023-12-15 DIAGNOSIS — R26.0: ICD-10-CM

## 2023-12-15 PROCEDURE — 70470 CT HEAD/BRAIN W/O & W/DYE: CPT

## 2023-12-15 PROCEDURE — 2550000001 HC RX 255 CONTRASTS: Performed by: INTERNAL MEDICINE

## 2023-12-15 RX ADMIN — IOHEXOL 69 ML: 350 INJECTION, SOLUTION INTRAVENOUS at 10:14

## 2023-12-18 DIAGNOSIS — E11.9 TYPE 2 DIABETES MELLITUS WITHOUT COMPLICATION, UNSPECIFIED WHETHER LONG TERM INSULIN USE (MULTI): Primary | ICD-10-CM

## 2023-12-27 ENCOUNTER — TELEPHONE (OUTPATIENT)
Dept: PRIMARY CARE | Facility: CLINIC | Age: 73
End: 2023-12-27

## 2023-12-27 NOTE — TELEPHONE ENCOUNTER
Patient notified of results and recommendation.    ----- Message from Griffin Baltazar DO sent at 12/18/2023  5:50 PM EST -----  Marilu  Can you please notify patient that A1c seems to be improving 8.2 from 8.4 continue present regimen recheck A1c in 3 months  Otherwise blood work stable normal  Thank you

## 2023-12-27 NOTE — TELEPHONE ENCOUNTER
Patient notified.    ----- Message from Griffin Baltazar DO sent at 12/22/2023  7:09 PM EST -----  Marilu  Please notify CT head normal thank you

## 2023-12-29 ENCOUNTER — TELEPHONE (OUTPATIENT)
Dept: PHARMACY | Facility: HOSPITAL | Age: 73
End: 2023-12-29

## 2023-12-29 NOTE — TELEPHONE ENCOUNTER
Population Health: Outreach by Ambulatory Pharmacy Team    Payor: Tran MENDOZA  Reason: Adherence  Medication: Jardiance 10mg, Rosuvastatin 40mg  Outcome: Patient Reached: Claims Adherence, patient reports not needing any refills at this time and that [facility] manages all of her fills    Sammi Cook, LelandD

## 2024-01-19 ENCOUNTER — TELEPHONE (OUTPATIENT)
Dept: PRIMARY CARE | Facility: CLINIC | Age: 74
End: 2024-01-19
Payer: MEDICARE

## 2024-01-19 DIAGNOSIS — E11.9 TYPE 2 DIABETES MELLITUS WITHOUT COMPLICATION, UNSPECIFIED WHETHER LONG TERM INSULIN USE (MULTI): Primary | ICD-10-CM

## 2024-02-08 ENCOUNTER — OFFICE VISIT (OUTPATIENT)
Dept: PRIMARY CARE | Facility: CLINIC | Age: 74
End: 2024-02-08
Payer: MEDICARE

## 2024-02-08 VITALS
SYSTOLIC BLOOD PRESSURE: 124 MMHG | OXYGEN SATURATION: 96 % | BODY MASS INDEX: 24.48 KG/M2 | HEART RATE: 69 BPM | HEIGHT: 67 IN | WEIGHT: 156 LBS | DIASTOLIC BLOOD PRESSURE: 66 MMHG

## 2024-02-08 DIAGNOSIS — Z91.89 AT RISK FOR IMPAIRED HEALTH MAINTENANCE: ICD-10-CM

## 2024-02-08 DIAGNOSIS — I70.213 INTERMITTENT CLAUDICATION OF BOTH LOWER EXTREMITIES DUE TO ATHEROSCLEROSIS (CMS-HCC): ICD-10-CM

## 2024-02-08 DIAGNOSIS — Z00.00 HEALTHCARE MAINTENANCE: Primary | ICD-10-CM

## 2024-02-08 DIAGNOSIS — J44.9 CHRONIC OBSTRUCTIVE PULMONARY DISEASE, UNSPECIFIED COPD TYPE (MULTI): ICD-10-CM

## 2024-02-08 DIAGNOSIS — E11.51 TYPE 2 DIABETES MELLITUS WITH DIABETIC PERIPHERAL ANGIOPATHY WITHOUT GANGRENE, UNSPECIFIED WHETHER LONG TERM INSULIN USE (MULTI): ICD-10-CM

## 2024-02-08 DIAGNOSIS — E55.9 VITAMIN D DEFICIENCY: ICD-10-CM

## 2024-02-08 DIAGNOSIS — Z12.31 VISIT FOR SCREENING MAMMOGRAM: ICD-10-CM

## 2024-02-08 PROCEDURE — 3078F DIAST BP <80 MM HG: CPT | Performed by: INTERNAL MEDICINE

## 2024-02-08 PROCEDURE — 3074F SYST BP LT 130 MM HG: CPT | Performed by: INTERNAL MEDICINE

## 2024-02-08 PROCEDURE — G0439 PPPS, SUBSEQ VISIT: HCPCS | Performed by: INTERNAL MEDICINE

## 2024-02-08 PROCEDURE — 1170F FXNL STATUS ASSESSED: CPT | Performed by: INTERNAL MEDICINE

## 2024-02-08 PROCEDURE — 1126F AMNT PAIN NOTED NONE PRSNT: CPT | Performed by: INTERNAL MEDICINE

## 2024-02-08 PROCEDURE — 1159F MED LIST DOCD IN RCRD: CPT | Performed by: INTERNAL MEDICINE

## 2024-02-08 PROCEDURE — 1036F TOBACCO NON-USER: CPT | Performed by: INTERNAL MEDICINE

## 2024-02-08 PROCEDURE — 99214 OFFICE O/P EST MOD 30 MIN: CPT | Performed by: INTERNAL MEDICINE

## 2024-02-08 PROCEDURE — 3008F BODY MASS INDEX DOCD: CPT | Performed by: INTERNAL MEDICINE

## 2024-02-08 ASSESSMENT — ENCOUNTER SYMPTOMS
DEPRESSION: 0
LOSS OF SENSATION IN FEET: 0
OCCASIONAL FEELINGS OF UNSTEADINESS: 0

## 2024-02-08 ASSESSMENT — ACTIVITIES OF DAILY LIVING (ADL)
DRESSING: INDEPENDENT
TAKING_MEDICATION: INDEPENDENT
GROCERY_SHOPPING: INDEPENDENT
DOING_HOUSEWORK: INDEPENDENT
BATHING: INDEPENDENT
MANAGING_FINANCES: INDEPENDENT

## 2024-02-08 ASSESSMENT — PATIENT HEALTH QUESTIONNAIRE - PHQ9
1. LITTLE INTEREST OR PLEASURE IN DOING THINGS: NOT AT ALL
2. FEELING DOWN, DEPRESSED OR HOPELESS: NOT AT ALL
SUM OF ALL RESPONSES TO PHQ9 QUESTIONS 1 AND 2: 0

## 2024-02-08 NOTE — PROGRESS NOTES
"Chief Complaint: Medicare Wellness Exam/Comprehensive Problem Focused Follow Up     HPI:    Subjective   Patient ID: Mare Ching is a 73 y.o. female who presents for Medicare Annual Wellness Visit Subsequent and Follow-up (Mammogram order).  HPI  female history COPD tobacco abuse diabetes hypothyroidism muscle cramps moderate CAD. UTI anxiety unstable angina s/p cath 4/2023 PCI, cath 10/2023 .889 ffr LAD lesion    Patient overall feels well no active issues aside from she has had some chronic intermittent dizziness over the years but this seems better overall she states that it was            Health Maintenance:      Colonoscopy: 2019      Mammogram: 2023      Pelvic/Pap:      Low dose chest CT:      Aorta duplex:      Optho:      Podiatry:        Vaccines:      Prevnar 20:      Prevnar 13:      Pneumovax 23:      Tdap:      Shingrix:      COVID:      Influenza:        ROS:      General: denies fever/chills/weight loss      Head: Chronic intermittent dizziness over the years better at present denies HA/trauma/masses/dizziness      Eyes: denies vision change/loss of vision/blurry vision/diplopia/eye pain      Ears: denies hearing loss/tinnitus/otalgia/otorrhea      Nose: denies nasal drainage/anosmia      Throat: denies dysphagia/odynophagia      Lymphatics: denies lymph node swelling      Cardiac: denies CP/palpitations/orthopnea/PND      Pulmonary: denies dyspnea/cough/wheezing      GI: denies abd pain/n/v/diarrhea/melena/hematochezia/hematemesis      : denies dysuria/hematuria/change frequency      Genital: denies genital discharge/lesions      Skin: denies rashes/lesions/masses      MSK:denies weakness/swelling/edema/gait imbalance/pain      Neuro: denies paresthesias/seizures/dysarthria      Psych: denies depression/anxiety/suicidal or homicidal ideations            Objective   /66   Pulse 69   Ht 1.702 m (5' 7\")   Wt 70.8 kg (156 lb)   SpO2 96%   BMI 24.43 kg/m²      Physical Exam:     " "General: AO3, NAD     Head: atraumatic/NC     Eyes: EOMI/PERRLA. Negative APD     Ears: TM pearly gray, EAC clear. No lesions or erythema     Nose: symmetric nares, no discharge     Throat: trachea midline, uvula midline pink mucosa. No thyromegaly     Lymphatics: no cervical/supraclavicular/ant or posterior cervical adenopathy/axillary/inguinal adenopathy     Breast: not examined     Chest: no deformity or tenderness to palpation     Pulm: CTA b/l, no wheeze/rhonchi/rales. nonlabored     Cardiac: RRR +s1s2, no m/r/g.      GI: soft, NT/ND. Normoactive Bsx4. No rebound/guarding.     Rectal: no examined     MSK: 5/5 strength UE LE. No edema/clubbing/cyanosis     Skin: no rashes/lesions     Vascular: 2+ palp DP PT radials b/l. Negative carotid bruit     Neuro: CNII-XII intact. No focal deficits. Reflexes 2/4 brachioradialis bicep tricep patellar achilles. Finger to nose intact.     Psych appropriate                    No results found for: \"BMPR1A\", \"CBCDIF\"      Assessment/Plan   Diagnoses and all orders for this visit:  Healthcare maintenance  -     CBC and Auto Differential; Future  -     Comprehensive Metabolic Panel; Future  -     Hemoglobin A1C; Future  -     Lipid Panel; Future  -     Thyroxine, Free; Future  -     Thyroid Stimulating Hormone; Future  -     Vitamin D 25 hydroxy; Future  -     Lipase; Future  -     Cancer Antigen 19-9; Future  At risk for impaired health maintenance  -     CBC and Auto Differential; Future  Vitamin D deficiency  -     Vitamin D 25 hydroxy; Future  Visit for screening mammogram  -     BI mammo bilateral screening tomosynthesis; Future  Type 2 diabetes mellitus with diabetic peripheral angiopathy without gangrene, unspecified whether long term insulin use (CMS/HCC)  Chronic obstructive pulmonary disease, unspecified COPD type (CMS/HCC)  Intermittent claudication of both lower extremities due to atherosclerosis (CMS/HCC)    Call and follow-up with cardiology tomorrow as planned for " further recommendations medical management noted possible future stent    Call and follow-up with ENT regarding chronic dizziness    Low sugar diet  Thank you for making appointment today Mare    Please follow-up 3 months     Griffin Baltazar DO, MARY Baltazar DO  Active Problem List

## 2024-02-20 ENCOUNTER — HOSPITAL ENCOUNTER (OUTPATIENT)
Dept: RADIOLOGY | Facility: CLINIC | Age: 74
Discharge: HOME | End: 2024-02-20
Payer: MEDICARE

## 2024-02-20 DIAGNOSIS — Z12.31 VISIT FOR SCREENING MAMMOGRAM: ICD-10-CM

## 2024-02-20 PROCEDURE — 77063 BREAST TOMOSYNTHESIS BI: CPT | Performed by: RADIOLOGY

## 2024-02-20 PROCEDURE — 77067 SCR MAMMO BI INCL CAD: CPT | Performed by: RADIOLOGY

## 2024-02-20 PROCEDURE — 77067 SCR MAMMO BI INCL CAD: CPT

## 2024-03-01 ENCOUNTER — TELEPHONE (OUTPATIENT)
Dept: PRIMARY CARE | Facility: CLINIC | Age: 74
End: 2024-03-01
Payer: MEDICARE

## 2024-03-01 NOTE — TELEPHONE ENCOUNTER
Patient notified.    ----- Message from Griffin Baltazar DO sent at 2/28/2024  5:08 PM EST -----  Marilu  Please notify patient mammogram is normal thank you

## 2024-04-01 ENCOUNTER — HOSPITAL ENCOUNTER (OUTPATIENT)
Dept: RADIOLOGY | Facility: EXTERNAL LOCATION | Age: 74
Discharge: HOME | End: 2024-04-01

## 2024-04-01 DIAGNOSIS — R05.9 COUGH, UNSPECIFIED TYPE: ICD-10-CM

## 2024-04-04 ENCOUNTER — OFFICE VISIT (OUTPATIENT)
Dept: PRIMARY CARE | Facility: CLINIC | Age: 74
End: 2024-04-04
Payer: MEDICARE

## 2024-04-04 VITALS
BODY MASS INDEX: 24.8 KG/M2 | OXYGEN SATURATION: 96 % | WEIGHT: 158 LBS | HEIGHT: 67 IN | TEMPERATURE: 98.2 F | DIASTOLIC BLOOD PRESSURE: 78 MMHG | SYSTOLIC BLOOD PRESSURE: 138 MMHG | HEART RATE: 83 BPM

## 2024-04-04 DIAGNOSIS — G89.4 PAIN AMPLIFICATION SYNDROME: ICD-10-CM

## 2024-04-04 DIAGNOSIS — R51.9 SEVERE HEADACHE: Primary | ICD-10-CM

## 2024-04-04 DIAGNOSIS — F40.240 CLAUSTROPHOBIA: ICD-10-CM

## 2024-04-04 PROBLEM — F19.21 HISTORY OF SUBSTANCE DEPENDENCE (MULTI): Status: RESOLVED | Noted: 2024-04-04 | Resolved: 2024-04-04

## 2024-04-04 PROBLEM — Z98.890 HISTORY OF COLONOSCOPY: Status: RESOLVED | Noted: 2024-04-04 | Resolved: 2024-04-04

## 2024-04-04 PROBLEM — N64.89 BREAST ASYMMETRY: Status: ACTIVE | Noted: 2024-04-04

## 2024-04-04 PROCEDURE — 3075F SYST BP GE 130 - 139MM HG: CPT | Performed by: INTERNAL MEDICINE

## 2024-04-04 PROCEDURE — 3008F BODY MASS INDEX DOCD: CPT | Performed by: INTERNAL MEDICINE

## 2024-04-04 PROCEDURE — 1036F TOBACCO NON-USER: CPT | Performed by: INTERNAL MEDICINE

## 2024-04-04 PROCEDURE — 99215 OFFICE O/P EST HI 40 MIN: CPT | Performed by: INTERNAL MEDICINE

## 2024-04-04 PROCEDURE — 1159F MED LIST DOCD IN RCRD: CPT | Performed by: INTERNAL MEDICINE

## 2024-04-04 PROCEDURE — 3078F DIAST BP <80 MM HG: CPT | Performed by: INTERNAL MEDICINE

## 2024-04-04 RX ORDER — LORAZEPAM 0.5 MG/1
0.5 TABLET ORAL ONCE
Qty: 1 TABLET | Refills: 0 | Status: SHIPPED | OUTPATIENT
Start: 2024-04-04 | End: 2024-04-04

## 2024-04-04 RX ORDER — METHYLPREDNISOLONE 4 MG/1
TABLET ORAL
COMMUNITY
Start: 2024-04-01 | End: 2024-05-31 | Stop reason: ALTCHOICE

## 2024-04-04 RX ORDER — DOXYCYCLINE HYCLATE 100 MG
TABLET ORAL
COMMUNITY
Start: 2024-04-01

## 2024-04-04 RX ORDER — NYSTATIN 100000 [USP'U]/ML
SUSPENSION ORAL
COMMUNITY
Start: 2024-04-01

## 2024-04-04 NOTE — PROGRESS NOTES
Subjective   Patient ID: Mare Ching is a 73 y.o. female who presents for sick (Has been sick for over 1 month. On antibiotics and steroids) and Headache (Head pressure and sensitive to touch.).  HPI        female history COPD tobacco abuse diabetes hypothyroidism muscle cramps moderate CAD. UTI anxiety unstable angina s/p cath 4/2023 PCI, cath 10/2023 .889 ffr LAD lesion    Patient has severe intermittent headaches head pain especially in the left parietal area for over a month grade 8 out of 10 is worse when she touches it and worse since she had a sinus infection that started about a month ago she was in the cold air was blowing on her that started the symptoms she went to pulmonary medicine was given Augmentin for 7 days  She developed a fever for 6 days and then was given another 3 days of Augmentin  Developed thrush and was eventually placed on antifungal thrush regimen when she went to urgent care recently over Easter was given a breathing treatment steroids Tessalon and a different antibiotic doxycycline  Patient is highly concerned about an aneurysm in the back of her scalp because of the persistent pain and severity very tender  Also having some pain around the eyes supraorbitally  Rhinorrhea persists with sinus congestion some green discharge  Denies vision changes diplopia focal weakness paresthesias nausea vomiting fever chills        Health Maintenance:      Colonoscopy: 2019      Mammogram: 2023      Pelvic/Pap:      Low dose chest CT:      Aorta duplex:      Optho:      Podiatry:        Vaccines:      Refer to Epic Vaccination Log        ROS:      General: denies fever/chills/weight loss      Head: Severe headaches persistent intermittent left posterior persistent congestion as well sensitivity of the scalp denies HA/trauma/masses/dizziness      Eyes: denies vision change/loss of vision/blurry vision/diplopia/eye pain      Ears: denies hearing loss/tinnitus/otalgia/otorrhea      Nose: Green  "rhinorrhea denies nasal drainage/anosmia      Throat: denies dysphagia/odynophagia      Lymphatics: denies lymph node swelling      Breast: denies masses/discharge/dimpling/skin changes      Cardiac: denies CP/palpitations/orthopnea/PND      Pulmonary: denies dyspnea/cough/wheezing      GI: denies abd pain/n/v/diarrhea/melena/hematochezia/hematemesis      : denies dysuria/hematuria/change frequency      Genital: denies genital discharge/lesions      Skin: denies rashes/lesions/masses      MSK: denies weakness/swelling/edema/gait imbalance/pain      Neuro: denies paresthesias/seizures/dysarthria      Psych: Severe claustrophobia with MRI denies depression/anxiety/suicidal or homicidal ideations            Objective   /78   Pulse 83   Temp 36.8 °C (98.2 °F)   Ht 1.702 m (5' 7\")   Wt 71.7 kg (158 lb)   SpO2 96%   BMI 24.75 kg/m²      Physical Exam:     General: AO3, NAD     Head: atraumatic/NC     Eyes: EOMI/PERRLA. Negative APD     Ears: TM pearly gray, EAC clear. No lesions or erythema     Nose: symmetric nares, no discharge     Throat: trachea midline, uvula midline pink mucosa. No thyromegaly     Lymphatics: no cervical/supraclavicular/ant or posterior cervical adenopathy/axillary/inguinal adenopathy     Breast: not examined     Chest: no deformity or tenderness to palpation     Pulm: CTA b/l, no wheeze/rhonchi/rales. nonlabored     Cardiac: RRR +s1s2, no m/r/g.      GI: soft, NT/ND. Normoactive Bsx4. No rebound/guarding.     Rectal: not examined     Genital: not examined     MSK: 5/5 strength UE LE. No edema/clubbing/cyanosis     Skin: no rashes/lesions     Vascular: 2+ palp DP PT radials b/l. Negative carotid bruit     Neuro: CNII-XII intact. No focal deficits. Reflexes 2/4 brachioradialis bicep tricep patellar achilles. Finger to nose intact.     Psych: appropriate mood/affect                    No results found for: \"BMPR1A\", \"CBCDIF\"      Assessment/Plan   Diagnoses and all orders for this " visit:  Severe headache  Comments:  Likely secondary to sinusitis with secondary neuritis subacute rule out less likely other such as aneurysm mass abscess  Orders:  -     MR brain w and wo IV contrast; Future  -     MR angio head wo IV contrast; Future  -     MR angio neck wo IV contrast; Future  -     Creatinine; Future  Claustrophobia  -     Drug Screen, Urine With Reflex to Confirmation; Future  -     LORazepam (Ativan) 0.5 mg tablet; Take 1 tablet (0.5 mg) by mouth 1 time for 1 dose. 30 minutes prior to MRI  Pain amplification syndrome  -     Drug Screen, Urine With Reflex to Confirmation; Future    Recommend home COVID test    Go to ER for any worsening head pain headaches or other concerning symptoms    Call follow-up with cardiology as ordered    Call follow-up with ENT    Screening blood work due February 2025    Thank you for making appointment today Mare    Please follow-up 1 month    Griffin Baltazar DO, MARY Weldon MA

## 2024-04-11 ENCOUNTER — APPOINTMENT (OUTPATIENT)
Dept: RADIOLOGY | Facility: CLINIC | Age: 74
End: 2024-04-11
Payer: MEDICARE

## 2024-04-11 ENCOUNTER — LAB (OUTPATIENT)
Dept: LAB | Facility: LAB | Age: 74
End: 2024-04-11
Payer: MEDICARE

## 2024-04-11 DIAGNOSIS — F40.240 CLAUSTROPHOBIA: ICD-10-CM

## 2024-04-11 DIAGNOSIS — I25.10 CAD IN NATIVE ARTERY: Primary | ICD-10-CM

## 2024-04-11 DIAGNOSIS — R51.9 SEVERE HEADACHE: ICD-10-CM

## 2024-04-11 DIAGNOSIS — E55.9 VITAMIN D DEFICIENCY: ICD-10-CM

## 2024-04-11 DIAGNOSIS — G89.4 PAIN AMPLIFICATION SYNDROME: ICD-10-CM

## 2024-04-11 DIAGNOSIS — I10 ESSENTIAL HYPERTENSION: ICD-10-CM

## 2024-04-11 DIAGNOSIS — Z91.89 AT RISK FOR IMPAIRED HEALTH MAINTENANCE: ICD-10-CM

## 2024-04-11 DIAGNOSIS — I25.10 CAD IN NATIVE ARTERY: ICD-10-CM

## 2024-04-11 DIAGNOSIS — Z00.00 HEALTHCARE MAINTENANCE: ICD-10-CM

## 2024-04-11 LAB
25(OH)D3 SERPL-MCNC: 25 NG/ML (ref 30–100)
ALBUMIN SERPL BCP-MCNC: 4.1 G/DL (ref 3.4–5)
ALP SERPL-CCNC: 48 U/L (ref 33–136)
ALT SERPL W P-5'-P-CCNC: 46 U/L (ref 7–45)
AMPHETAMINES UR QL SCN: NORMAL
ANION GAP SERPL CALC-SCNC: 14 MMOL/L (ref 10–20)
AST SERPL W P-5'-P-CCNC: 20 U/L (ref 9–39)
BARBITURATES UR QL SCN: NORMAL
BASOPHILS # BLD AUTO: 0.02 X10*3/UL (ref 0–0.1)
BASOPHILS NFR BLD AUTO: 0.2 %
BENZODIAZ UR QL SCN: NORMAL
BILIRUB SERPL-MCNC: 0.6 MG/DL (ref 0–1.2)
BUN SERPL-MCNC: 16 MG/DL (ref 6–23)
BZE UR QL SCN: NORMAL
CALCIUM SERPL-MCNC: 9.8 MG/DL (ref 8.6–10.6)
CANCER AG19-9 SERPL-ACNC: 19.76 U/ML
CANNABINOIDS UR QL SCN: NORMAL
CHLORIDE SERPL-SCNC: 101 MMOL/L (ref 98–107)
CHOLEST SERPL-MCNC: 230 MG/DL (ref 0–199)
CHOLESTEROL/HDL RATIO: 4.6
CO2 SERPL-SCNC: 28 MMOL/L (ref 21–32)
CREAT SERPL-MCNC: 0.72 MG/DL (ref 0.5–1.05)
EGFRCR SERPLBLD CKD-EPI 2021: 88 ML/MIN/1.73M*2
EOSINOPHIL # BLD AUTO: 0.15 X10*3/UL (ref 0–0.4)
EOSINOPHIL NFR BLD AUTO: 1.9 %
ERYTHROCYTE [DISTWIDTH] IN BLOOD BY AUTOMATED COUNT: 14.8 % (ref 11.5–14.5)
EST. AVERAGE GLUCOSE BLD GHB EST-MCNC: 186 MG/DL
FENTANYL+NORFENTANYL UR QL SCN: NORMAL
GLUCOSE SERPL-MCNC: 173 MG/DL (ref 74–99)
HBA1C MFR BLD: 8.1 %
HCT VFR BLD AUTO: 45.9 % (ref 36–46)
HDLC SERPL-MCNC: 50.5 MG/DL
HGB BLD-MCNC: 14.6 G/DL (ref 12–16)
IMM GRANULOCYTES # BLD AUTO: 0.06 X10*3/UL (ref 0–0.5)
IMM GRANULOCYTES NFR BLD AUTO: 0.7 % (ref 0–0.9)
LDLC SERPL CALC-MCNC: 141 MG/DL
LIPASE SERPL-CCNC: 14 U/L (ref 9–82)
LYMPHOCYTES # BLD AUTO: 2.08 X10*3/UL (ref 0.8–3)
LYMPHOCYTES NFR BLD AUTO: 25.9 %
MCH RBC QN AUTO: 27.9 PG (ref 26–34)
MCHC RBC AUTO-ENTMCNC: 31.8 G/DL (ref 32–36)
MCV RBC AUTO: 88 FL (ref 80–100)
METHADONE UR QL SCN: NORMAL
MONOCYTES # BLD AUTO: 0.8 X10*3/UL (ref 0.05–0.8)
MONOCYTES NFR BLD AUTO: 10 %
NEUTROPHILS # BLD AUTO: 4.92 X10*3/UL (ref 1.6–5.5)
NEUTROPHILS NFR BLD AUTO: 61.3 %
NON HDL CHOLESTEROL: 180 MG/DL (ref 0–149)
NRBC BLD-RTO: 0 /100 WBCS (ref 0–0)
OPIATES UR QL SCN: NORMAL
OXYCODONE+OXYMORPHONE UR QL SCN: NORMAL
PCP UR QL SCN: NORMAL
PLATELET # BLD AUTO: 228 X10*3/UL (ref 150–450)
POTASSIUM SERPL-SCNC: 4.4 MMOL/L (ref 3.5–5.3)
PROT SERPL-MCNC: 6.8 G/DL (ref 6.4–8.2)
RBC # BLD AUTO: 5.23 X10*6/UL (ref 4–5.2)
SODIUM SERPL-SCNC: 139 MMOL/L (ref 136–145)
T4 FREE SERPL-MCNC: 1.34 NG/DL (ref 0.78–1.48)
TRIGL SERPL-MCNC: 195 MG/DL (ref 0–149)
TSH SERPL-ACNC: 0.19 MIU/L (ref 0.44–3.98)
VLDL: 39 MG/DL (ref 0–40)
WBC # BLD AUTO: 8 X10*3/UL (ref 4.4–11.3)

## 2024-04-11 PROCEDURE — 80307 DRUG TEST PRSMV CHEM ANLYZR: CPT

## 2024-04-11 PROCEDURE — 80053 COMPREHEN METABOLIC PANEL: CPT

## 2024-04-11 PROCEDURE — 83690 ASSAY OF LIPASE: CPT

## 2024-04-11 PROCEDURE — 84439 ASSAY OF FREE THYROXINE: CPT

## 2024-04-11 PROCEDURE — 84443 ASSAY THYROID STIM HORMONE: CPT

## 2024-04-11 PROCEDURE — 36415 COLL VENOUS BLD VENIPUNCTURE: CPT

## 2024-04-11 PROCEDURE — 85025 COMPLETE CBC W/AUTO DIFF WBC: CPT

## 2024-04-11 PROCEDURE — 80061 LIPID PANEL: CPT

## 2024-04-11 PROCEDURE — 83036 HEMOGLOBIN GLYCOSYLATED A1C: CPT

## 2024-04-11 PROCEDURE — 82306 VITAMIN D 25 HYDROXY: CPT

## 2024-04-11 PROCEDURE — 86301 IMMUNOASSAY TUMOR CA 19-9: CPT

## 2024-04-11 RX ORDER — AMLODIPINE BESYLATE 2.5 MG/1
2.5 TABLET ORAL DAILY
Qty: 90 TABLET | Refills: 3 | Status: SHIPPED | OUTPATIENT
Start: 2024-04-11 | End: 2024-05-29 | Stop reason: WASHOUT

## 2024-04-11 RX ORDER — CLOPIDOGREL BISULFATE 75 MG/1
75 TABLET ORAL DAILY
Qty: 90 TABLET | Refills: 0 | Status: SHIPPED | OUTPATIENT
Start: 2024-04-11

## 2024-04-19 DIAGNOSIS — B37.9 CANDIDIASIS: ICD-10-CM

## 2024-04-19 DIAGNOSIS — E55.9 VITAMIN D DEFICIENCY: Primary | ICD-10-CM

## 2024-04-19 RX ORDER — FLUCONAZOLE 150 MG/1
150 TABLET ORAL WEEKLY
Qty: 1 TABLET | Refills: 6 | Status: SHIPPED | OUTPATIENT
Start: 2024-04-19 | End: 2024-04-20

## 2024-04-19 RX ORDER — VIT C/E/ZN/COPPR/LUTEIN/ZEAXAN 250MG-90MG
25 CAPSULE ORAL DAILY
Qty: 90 CAPSULE | Refills: 1 | Status: SHIPPED | OUTPATIENT
Start: 2024-04-19 | End: 2024-07-18

## 2024-05-01 ENCOUNTER — HOSPITAL ENCOUNTER (OUTPATIENT)
Dept: RADIOLOGY | Facility: CLINIC | Age: 74
Discharge: HOME | End: 2024-05-01
Payer: MEDICARE

## 2024-05-01 DIAGNOSIS — R51.9 SEVERE HEADACHE: ICD-10-CM

## 2024-05-01 DIAGNOSIS — I65.23 BILATERAL CAROTID ARTERY STENOSIS: Primary | ICD-10-CM

## 2024-05-01 DIAGNOSIS — E11.9 TYPE 2 DIABETES MELLITUS WITHOUT COMPLICATION, WITHOUT LONG-TERM CURRENT USE OF INSULIN (MULTI): ICD-10-CM

## 2024-05-01 DIAGNOSIS — E03.9 HYPOTHYROIDISM, UNSPECIFIED TYPE: ICD-10-CM

## 2024-05-01 PROCEDURE — 70544 MR ANGIOGRAPHY HEAD W/O DYE: CPT | Performed by: RADIOLOGY

## 2024-05-01 PROCEDURE — 70551 MRI BRAIN STEM W/O DYE: CPT

## 2024-05-01 PROCEDURE — 70544 MR ANGIOGRAPHY HEAD W/O DYE: CPT

## 2024-05-01 PROCEDURE — 70547 MR ANGIOGRAPHY NECK W/O DYE: CPT | Performed by: RADIOLOGY

## 2024-05-01 PROCEDURE — 70551 MRI BRAIN STEM W/O DYE: CPT | Performed by: RADIOLOGY

## 2024-05-01 PROCEDURE — 70547 MR ANGIOGRAPHY NECK W/O DYE: CPT

## 2024-05-02 DIAGNOSIS — B37.9 YEAST INFECTION: ICD-10-CM

## 2024-05-02 RX ORDER — BLOOD SUGAR DIAGNOSTIC
STRIP MISCELLANEOUS
Qty: 200 STRIP | Refills: 0 | Status: SHIPPED | OUTPATIENT
Start: 2024-05-02

## 2024-05-02 RX ORDER — FLUCONAZOLE 150 MG/1
150 TABLET ORAL ONCE
COMMUNITY
End: 2024-05-02 | Stop reason: SDUPTHER

## 2024-05-02 RX ORDER — FLUCONAZOLE 150 MG/1
TABLET ORAL
Qty: 3 TABLET | Refills: 3 | Status: SHIPPED | OUTPATIENT
Start: 2024-05-02 | End: 2024-05-29 | Stop reason: WASHOUT

## 2024-05-02 RX ORDER — LEVOTHYROXINE SODIUM 100 UG/1
100 TABLET ORAL
Qty: 90 TABLET | Refills: 1 | Status: SHIPPED | OUTPATIENT
Start: 2024-05-02

## 2024-05-06 DIAGNOSIS — E78.2 MIXED HYPERLIPIDEMIA: ICD-10-CM

## 2024-05-06 RX ORDER — ROSUVASTATIN CALCIUM 40 MG/1
40 TABLET, COATED ORAL DAILY
Qty: 90 TABLET | Refills: 3 | Status: SHIPPED | OUTPATIENT
Start: 2024-05-06

## 2024-05-13 ENCOUNTER — HOSPITAL ENCOUNTER (OUTPATIENT)
Dept: VASCULAR MEDICINE | Facility: CLINIC | Age: 74
Discharge: HOME | End: 2024-05-13
Payer: MEDICARE

## 2024-05-13 DIAGNOSIS — R09.89 OTHER SPECIFIED SYMPTOMS AND SIGNS INVOLVING THE CIRCULATORY AND RESPIRATORY SYSTEMS: ICD-10-CM

## 2024-05-13 DIAGNOSIS — I65.23 BILATERAL CAROTID ARTERY STENOSIS: ICD-10-CM

## 2024-05-13 PROBLEM — R51.9 HEADACHE: Status: ACTIVE | Noted: 2024-05-13

## 2024-05-13 PROBLEM — R05.9 COUGH: Status: ACTIVE | Noted: 2024-04-01

## 2024-05-13 PROCEDURE — 93880 EXTRACRANIAL BILAT STUDY: CPT

## 2024-05-13 PROCEDURE — 93880 EXTRACRANIAL BILAT STUDY: CPT | Performed by: SURGERY

## 2024-05-14 ENCOUNTER — HOSPITAL ENCOUNTER (OUTPATIENT)
Facility: HOSPITAL | Age: 74
Setting detail: SURGERY ADMIT
End: 2024-05-14
Attending: SURGERY | Admitting: SURGERY
Payer: MEDICARE

## 2024-05-14 ENCOUNTER — OFFICE VISIT (OUTPATIENT)
Dept: VASCULAR SURGERY | Facility: CLINIC | Age: 74
End: 2024-05-14
Payer: MEDICARE

## 2024-05-14 VITALS
RESPIRATION RATE: 18 BRPM | HEART RATE: 77 BPM | WEIGHT: 158.5 LBS | TEMPERATURE: 98.6 F | DIASTOLIC BLOOD PRESSURE: 75 MMHG | SYSTOLIC BLOOD PRESSURE: 147 MMHG | HEIGHT: 68 IN | BODY MASS INDEX: 24.02 KG/M2

## 2024-05-14 DIAGNOSIS — I20.89 ANGINA AT REST (CMS-HCC): ICD-10-CM

## 2024-05-14 DIAGNOSIS — J44.1 COPD WITH ACUTE EXACERBATION (MULTI): ICD-10-CM

## 2024-05-14 DIAGNOSIS — I65.23 BILATERAL CAROTID ARTERY STENOSIS: Primary | ICD-10-CM

## 2024-05-14 DIAGNOSIS — I25.10 CORONARY ARTERY DISEASE DUE TO CALCIFIED CORONARY LESION: ICD-10-CM

## 2024-05-14 DIAGNOSIS — I25.84 CORONARY ARTERY DISEASE DUE TO CALCIFIED CORONARY LESION: ICD-10-CM

## 2024-05-14 PROCEDURE — 1125F AMNT PAIN NOTED PAIN PRSNT: CPT | Performed by: SURGERY

## 2024-05-14 PROCEDURE — 3052F HG A1C>EQUAL 8.0%<EQUAL 9.0%: CPT | Performed by: SURGERY

## 2024-05-14 PROCEDURE — 1159F MED LIST DOCD IN RCRD: CPT | Performed by: SURGERY

## 2024-05-14 PROCEDURE — 99215 OFFICE O/P EST HI 40 MIN: CPT | Performed by: SURGERY

## 2024-05-14 PROCEDURE — 3050F LDL-C >= 130 MG/DL: CPT | Performed by: SURGERY

## 2024-05-14 PROCEDURE — 99205 OFFICE O/P NEW HI 60 MIN: CPT | Performed by: SURGERY

## 2024-05-14 PROCEDURE — 3078F DIAST BP <80 MM HG: CPT | Performed by: SURGERY

## 2024-05-14 PROCEDURE — 3077F SYST BP >= 140 MM HG: CPT | Performed by: SURGERY

## 2024-05-14 ASSESSMENT — LIFESTYLE VARIABLES
HOW OFTEN DO YOU HAVE SIX OR MORE DRINKS ON ONE OCCASION: NEVER
SKIP TO QUESTIONS 9-10: 1
AUDIT-C TOTAL SCORE: 0
HOW OFTEN DO YOU HAVE A DRINK CONTAINING ALCOHOL: NEVER
HOW MANY STANDARD DRINKS CONTAINING ALCOHOL DO YOU HAVE ON A TYPICAL DAY: PATIENT DOES NOT DRINK

## 2024-05-14 ASSESSMENT — PATIENT HEALTH QUESTIONNAIRE - PHQ9
2. FEELING DOWN, DEPRESSED OR HOPELESS: NOT AT ALL
1. LITTLE INTEREST OR PLEASURE IN DOING THINGS: NOT AT ALL
SUM OF ALL RESPONSES TO PHQ9 QUESTIONS 1 AND 2: 0

## 2024-05-14 ASSESSMENT — PAIN SCALES - GENERAL: PAINLEVEL: 1

## 2024-05-14 ASSESSMENT — ENCOUNTER SYMPTOMS
DEPRESSION: 0
LOSS OF SENSATION IN FEET: 0
OCCASIONAL FEELINGS OF UNSTEADINESS: 1

## 2024-05-14 NOTE — PROGRESS NOTES
Vascular Surgery Consultation, History, Physical    Mare Ching is a 73 y.o. year old female patient.   History: Patient referred for concern for carotid stenosis.  Duplex reveals right internal carotid artery stenosis over 90%.  The end-diastolic velocities are quite high.  She is asymptomatic of stroke.  She is quite worried-she has a sister who is disabled by a severe hemispheric stroke.  She does have unstable angina for which she has had 2 cardiac catheterizations and was offered open heart surgery versus more stents.  Her anginal symptom is of jaw pain and left arm pain.  Bedside ultrasound reveals the right internal carotid artery plaque to be surgically accessible.  MRI findings are of near total occlusion but the contrast column is normal size beyond.  The ultrasound would suggest this is not a string sign but I high-grade stenosis.  The left side has moderate disease.    Past Medical History:   Diagnosis Date   • Abdominal bloating 08/03/2023   • Acute bacterial bronchitis 08/03/2023   • Acute UTI 08/03/2023   • Body mass index (BMI) 25.0-25.9, adult     BMI 25.0-25.9,adult   • Body mass index (BMI) 26.0-26.9, adult     BMI 26.0-26.9,adult   • Candidal vaginitis 08/03/2023   • Chest pain, atypical 08/03/2023   • Chest pain, midsternal 08/03/2023   • Chronic obstructive pulmonary disease, unspecified (Multi) 07/15/2021    COPD (chronic obstructive pulmonary disease)   • Coronary artery disease    • Dysuria 08/03/2023   • Flank pain 08/03/2023   • Hip pain, right 08/03/2023   • History of colonoscopy 04/04/2024   • History of substance dependence (Multi) 04/04/2024   • Hyperlipidemia, unspecified 10/03/2022    Hyperlipidemia   • Hypertension    • Hypothyroidism, unspecified 02/23/2022    Hypothyroidism   • Increased urinary frequency 08/03/2023   • Other conditions influencing health status 04/29/2015    Mammogram normal   • Other specified health status     No pertinent past surgical history   •  Other specified health status 2015    No pertinent past medical history   • Other specified postprocedural states 2015    H/O colonoscopy   • Personal history of other diseases of the respiratory system     History of asthma   • Proteinuria 2023   • Recurrent urinary tract infection 2023   • Sialoadenitis 2023   • SOB (shortness of breath) 2023   • Type 2 diabetes mellitus without complications (Multi) 10/11/2022    Diabetes mellitus   • Urinary urgency 2023       Past Surgical History:   Procedure Laterality Date   • CARDIAC CATHETERIZATION N/A 10/23/2023    Procedure: Left Heart Cath, With LV (04288);  Surgeon: Manpreet Garg MD;  Location: [facility] Cardiac [facility];  Service: Cardiovascular;  Laterality: N/A;   • HYSTERECTOMY  2015    Hysterectomy   • OTHER SURGICAL HISTORY  2022    Tubal ligation   • OTHER SURGICAL HISTORY  2020     section   • OTHER SURGICAL HISTORY  2015    Surgery Excision Of Parotid Tumor/Gland       Active Ambulatory Problems     Diagnosis Date Noted   • Adrenal adenoma 2023   • Alopecia 2023   • Anxiety 2023   • BPPV (benign paroxysmal positional vertigo) 2023   • Elevated coronary artery calcium score 2023   • Chronic abdominal pain 2023   • COPD with acute exacerbation (Multi) 2023   • Daytime somnolence 2023   • Diabetes mellitus (Multi) 2023   • Fatigue 2023   • Hair loss 2023   • Herpes labialis 2023   • History of colon polyps 2023   • Hydronephrosis 2023   • Hypercalcemia 2023   • Hyperlipidemia 2023   • Hypothyroidism 2023   • Intermittent claudication of both lower extremities due to atherosclerosis (CMS-HCC) 2023   • Leg edema 2023   • Lumbar radiculopathy 2023   • Muscle cramps 2023   • Unstable angina pectoris due to coronary arteriosclerosis (Multi) 2023   • Vaginal atrophy  08/03/2023   • Vitamin D deficiency 05/30/2019   • Type 2 diabetes mellitus without complication, without long-term current use of insulin (Multi) 07/18/2018   • Overweight with body mass index (BMI) 25.0-29.9 12/02/2016   • Nuclear sclerotic cataract of both eyes 07/20/2021   • Essential hypertension 05/30/2019   • Elevated liver enzymes 12/02/2016   • CAD in native artery 10/11/2023   • Type 2 diabetes mellitus with diabetic peripheral angiopathy without gangrene, unspecified whether long term insulin use (Multi) 02/08/2024   • Breast asymmetry 04/04/2024   • Cough 04/01/2024   • Headache 05/13/2024     Resolved Ambulatory Problems     Diagnosis Date Noted   • Abdominal bloating 08/03/2023   • Acute bacterial bronchitis 08/03/2023   • Acute UTI 08/03/2023   • Candidal vaginitis 08/03/2023   • Chest pain, atypical 08/03/2023   • Chest pain, midsternal 08/03/2023   • Dysuria 08/03/2023   • Flank pain 08/03/2023   • Low back pain 08/03/2023   • Hematuria 08/03/2023   • Hip pain, right 08/03/2023   • Increased urinary frequency 08/03/2023   • Sialoadenitis 08/03/2023   • Sialoadenitis 08/03/2023   • Tonsillitis 08/03/2023   • Proteinuria 08/03/2023   • Recurrent urinary tract infection 08/03/2023   • Reflux laryngitis 08/03/2023   • Right shoulder pain 08/03/2023   • SOB (shortness of breath) 08/03/2023   • Urinary urgency 08/03/2023   • History of substance dependence (Multi) 04/04/2024   • History of colonoscopy 04/04/2024     Past Medical History:   Diagnosis Date   • Body mass index (BMI) 25.0-25.9, adult    • Body mass index (BMI) 26.0-26.9, adult    • Chronic obstructive pulmonary disease, unspecified (Multi) 07/15/2021   • Coronary artery disease    • Hyperlipidemia, unspecified 10/03/2022   • Hypertension    • Hypothyroidism, unspecified 02/23/2022   • Other conditions influencing health status 04/29/2015   • Other specified health status    • Other specified health status 04/29/2015   • Other specified  postprocedural states 04/29/2015   • Personal history of other diseases of the respiratory system    • Type 2 diabetes mellitus without complications (Multi) 10/11/2022       Review of Systems   Constitutional: Negative.    HENT: Negative.     Eyes: Negative.    Respiratory: Negative.     Cardiovascular: Negative.    Gastrointestinal: Negative.    Endocrine: Negative.    Genitourinary: Negative.    Musculoskeletal: Negative.    Skin: Negative.    Allergic/Immunologic: Negative.    Neurological: Negative.    Hematological: Negative.    Psychiatric/Behavioral: Negative.       Allergies   Allergen Reactions   • Adhesive Tape-Silicones Other     blisters       Vitals:   Visit Vitals  /75 (BP Location: Left arm, Patient Position: Sitting, BP Cuff Size: Adult)   Pulse 77   Temp 37 °C (98.6 °F)   Resp 18     Physical Exam:   Vascular Physical Exam  Constitutional:       General: She is awake.   HENT:      Head: Normocephalic and atraumatic.   Neck:      Comments: Bedside ultrasound shows surgically accessible plaque in the right bifurcation and internal carotid artery.  Cardiovascular:      Rate and Rhythm: Normal rate and regular rhythm.      Pulses:           Radial pulses are 2+ on the right side and 2+ on the left side.   Pulmonary:      Effort: Pulmonary effort is normal.   Abdominal:      General: Abdomen is protuberant.      Palpations: Abdomen is soft.      Tenderness: There is no abdominal tenderness.   Musculoskeletal:      Neck: Full passive range of motion without pain.   Skin:     General: Skin is warm and dry.   Neurological:      General: No focal deficit present.      Mental Status: She is alert and oriented to person, place, and time.      Cranial Nerves: No cranial nerve deficit.      Sensory: No sensory deficit.      Motor: No weakness.   Psychiatric:         Mood and Affect: Mood normal.         Behavior: Behavior normal.       Labs:  LABS:  CBC with Differential:    Lab Results   Component Value  "Date    WBC 8.0 04/11/2024    RBC 5.23 (H) 04/11/2024    HGB 14.6 04/11/2024    HCT 45.9 04/11/2024     04/11/2024    MCV 88 04/11/2024    MCH 27.9 04/11/2024    MCHC 31.8 (L) 04/11/2024    RDW 14.8 (H) 04/11/2024    NRBC 0.0 04/11/2024    LYMPHOPCT 25.9 04/11/2024    MONOPCT 10.0 04/11/2024    EOSPCT 1.9 04/11/2024    BASOPCT 0.2 04/11/2024    MONOSABS 0.80 04/11/2024    LYMPHSABS 2.08 04/11/2024    EOSABS 0.15 04/11/2024    BASOSABS 0.02 04/11/2024     CMP:    Lab Results   Component Value Date     04/11/2024    K 4.4 04/11/2024     04/11/2024    CO2 28 04/11/2024    BUN 16 04/11/2024    CREATININE 0.72 04/11/2024    GLUCOSE 173 (H) 04/11/2024    PROT 6.8 04/11/2024    CALCIUM 9.8 04/11/2024    BILITOT 0.6 04/11/2024    ALKPHOS 48 04/11/2024    AST 20 04/11/2024    ALT 46 (H) 04/11/2024     BMP:    Lab Results   Component Value Date     04/11/2024    K 4.4 04/11/2024     04/11/2024    CO2 28 04/11/2024    BUN 16 04/11/2024    CREATININE 0.72 04/11/2024    CALCIUM 9.8 04/11/2024    GLUCOSE 173 (H) 04/11/2024     Magnesium:  Lab Results   Component Value Date    MG 2.35 08/08/2023     Troponin:  No results found for: \"TROPHS\"  BNP: No results found for: \"BNP\"    Lab Results   Component Value Date    INR 1.0 10/13/2023    PROTIME 11.3 10/13/2023    CHOL 230 (H) 04/11/2024    LDLF 73 03/01/2023    HDL 50.5 04/11/2024       Imaging: reviewed      Assessment/Plan   Diagnoses and all orders for this visit:  Bilateral carotid artery stenosis  -     Case Request Operating Room: Endarterectomy Carotid Artery  -     Referral to Cardiology; Future  COPD with acute exacerbation (Multi)  Coronary artery disease due to calcified coronary lesion  Angina at rest (CMS-[facility])      " no radiation

## 2024-05-15 ENCOUNTER — APPOINTMENT (OUTPATIENT)
Dept: PRIMARY CARE | Facility: CLINIC | Age: 74
End: 2024-05-15
Payer: MEDICARE

## 2024-05-20 ENCOUNTER — APPOINTMENT (OUTPATIENT)
Dept: VASCULAR MEDICINE | Facility: CLINIC | Age: 74
End: 2024-05-20
Payer: MEDICARE

## 2024-05-29 ENCOUNTER — OFFICE VISIT (OUTPATIENT)
Dept: CARDIOLOGY | Facility: CLINIC | Age: 74
End: 2024-05-29
Payer: MEDICARE

## 2024-05-29 VITALS
DIASTOLIC BLOOD PRESSURE: 60 MMHG | OXYGEN SATURATION: 97 % | HEART RATE: 66 BPM | BODY MASS INDEX: 24.8 KG/M2 | HEIGHT: 67 IN | WEIGHT: 158 LBS | SYSTOLIC BLOOD PRESSURE: 138 MMHG

## 2024-05-29 DIAGNOSIS — I25.10 CAD IN NATIVE ARTERY: ICD-10-CM

## 2024-05-29 DIAGNOSIS — R93.1 ELEVATED CORONARY ARTERY CALCIUM SCORE: ICD-10-CM

## 2024-05-29 DIAGNOSIS — I10 ESSENTIAL HYPERTENSION: Primary | ICD-10-CM

## 2024-05-29 DIAGNOSIS — I65.23 BILATERAL CAROTID ARTERY STENOSIS: ICD-10-CM

## 2024-05-29 DIAGNOSIS — E78.2 MIXED HYPERLIPIDEMIA: ICD-10-CM

## 2024-05-29 PROBLEM — I25.110 UNSTABLE ANGINA PECTORIS DUE TO CORONARY ARTERIOSCLEROSIS (MULTI): Status: RESOLVED | Noted: 2023-08-03 | Resolved: 2024-05-29

## 2024-05-29 PROCEDURE — 1036F TOBACCO NON-USER: CPT | Performed by: INTERNAL MEDICINE

## 2024-05-29 PROCEDURE — 3050F LDL-C >= 130 MG/DL: CPT | Performed by: INTERNAL MEDICINE

## 2024-05-29 PROCEDURE — 93000 ELECTROCARDIOGRAM COMPLETE: CPT | Performed by: INTERNAL MEDICINE

## 2024-05-29 PROCEDURE — 99214 OFFICE O/P EST MOD 30 MIN: CPT | Performed by: INTERNAL MEDICINE

## 2024-05-29 PROCEDURE — 3075F SYST BP GE 130 - 139MM HG: CPT | Performed by: INTERNAL MEDICINE

## 2024-05-29 PROCEDURE — 1159F MED LIST DOCD IN RCRD: CPT | Performed by: INTERNAL MEDICINE

## 2024-05-29 PROCEDURE — 3052F HG A1C>EQUAL 8.0%<EQUAL 9.0%: CPT | Performed by: INTERNAL MEDICINE

## 2024-05-29 PROCEDURE — 4010F ACE/ARB THERAPY RXD/TAKEN: CPT | Performed by: INTERNAL MEDICINE

## 2024-05-29 PROCEDURE — 3078F DIAST BP <80 MM HG: CPT | Performed by: INTERNAL MEDICINE

## 2024-05-29 RX ORDER — LOSARTAN POTASSIUM 100 MG/1
100 TABLET ORAL DAILY
Qty: 90 TABLET | Refills: 3 | Status: SHIPPED | OUTPATIENT
Start: 2024-05-29 | End: 2025-05-29

## 2024-05-29 NOTE — PROGRESS NOTES
Subjective   Mare Ching is a 73 y.o. female.    Chief Complaint:  Preoperative evaluation prior to carotid endarterectomy.    HPI    She presented with complaints of headache and neck pain.  She underwent an MRI which showed a tight carotid stenosis.  This was confirmed by an ultrasound study of the carotid artery.  She is found to have a severe right carotid stenosis.  She is scheduled for surgical procedure.  She is here for preoperative evaluation.    She feels well.  Her previous anginal symptoms with chest tightness radiating into the neck has essentially resolved.  She has had no neurologic symptoms.    Cardiac catheterization performed on October 23, 2023 showed a proximal 50% left anterior descending coronary artery stenosis.  This was not significant by FFR studies.  Mild disease in the mid to distal left anterior descending coronary artery the circumflex had mild disease.  Medical therapy was recommended.      In April 2023, she underwent cardiac catheterization angioplasty and stent placement of a 90% left anterior descending coronary artery stenosis.      She has a history of hypertension. There is a positive family history of coronary artery disease in the mother had coronary artery bypass grafting ×3. She is a past smoker. She used to smoke 2 packs per day but quit about 20 years ago. She does have a history of hyperlipidemia.      Allergies  Medication    · Surgical TAPE   Recorded By: Ingrid Miranda; 2/19/2020 12:50:40 PM     Family History  Mother    · Family history of diabetes mellitus (V18.0) (Z83.3)   · Family history of malignant neoplasm (V16.9) (Z80.9)   · Family history of pancreatic cancer (V16.0) (Z80.0)  Father    · Family history of malignant neoplasm (V16.9) (Z80.9)   · Family history of malignant neoplasm of urinary bladder (V16.52)    Social History  Problems    · Former smoker (V15.82) (Z87.891)   · QUIT 24 YEARS AGO   · No drug use   · Retired from employment   ·  "Social alcohol use (Z78.9)    Review of Systems   All other systems reviewed and are negative.      Current Outpatient Medications   Medication Sig Dispense Refill    blood sugar diagnostic (Accu-Chek Guide test strips) strip USE TO TEST BLOOD SUGAR 3 TIMES DAILY 200 strip 0    cholecalciferol (Vitamin D-3) 50 mcg (2,000 unit) capsule Take 2 capsules (100 mcg) by mouth once daily.      cholecalciferol (Vitamin D3) 25 MCG (1000 UT) capsule Take 1 capsule (25 mcg) by mouth once daily. 90 capsule 1    clopidogrel (Plavix) 75 mg tablet TAKE ONE TABLET BY MOUTH EVERY DAY 90 tablet 0    doxycycline (Vibra-Tabs) 100 mg tablet       econazole nitrate 1 % cream apply 1-2 times daily      empagliflozin (Jardiance) 25 mg Take 1 tablet (25 mg) by mouth once daily. 90 tablet 1    lancets misc Use 3 times daily as directed      levothyroxine (Synthroid, Levoxyl) 100 mcg tablet Take 1 tablet (100 mcg) by mouth once daily in the morning. Take before meals. 90 tablet 1    magnesium 250 mg tablet Take 1 tablet (250 mg) by mouth once daily.      methylPREDNISolone (Medrol Dospak) 4 mg tablets       metoprolol succinate XL (Toprol-XL) 25 mg 24 hr tablet Take 1 tablet (25 mg) by mouth once daily. Do not crush or chew. 30 tablet 3    nystatin (Mycostatin) 100,000 unit/mL suspension       rosuvastatin (Crestor) 40 mg tablet TAKE ONE TABLET BY MOUTH EVERY DAY 90 tablet 3    Spiriva Respimat 2.5 mcg/actuation inhaler Inhale.      Symbicort 160-4.5 mcg/actuation inhaler Inhale.      triamcinolone (Kenalog) 0.1 % cream       Ventolin HFA 90 mcg/actuation inhaler       LORazepam (Ativan) 0.5 mg tablet Take 1 tablet (0.5 mg) by mouth 1 time for 1 dose. 30 minutes prior to MRI 1 tablet 0    losartan (Cozaar) 100 mg tablet Take 1 tablet (100 mg) by mouth once daily. 90 tablet 3     No current facility-administered medications for this visit.        Visit Vitals  /60 (BP Location: Right arm)   Pulse 66   Ht 1.702 m (5' 7\")   Wt 71.7 kg (158 " lb)   SpO2 97%   BMI 24.75 kg/m²   OB Status Postmenopausal   Smoking Status Former   BSA 1.84 m²        Objective     Constitutional:       Appearance: Not in distress.   Neck:      Vascular: JVD normal.   Pulmonary:      Breath sounds: Normal breath sounds.   Cardiovascular:      Normal rate. Regular rhythm. Normal S1. Normal S2.       Murmurs: There is no murmur.      No gallop.    Pulses:     Intact distal pulses.   Edema:     Peripheral edema absent.   Abdominal:      General: There is no distension.      Palpations: Abdomen is soft.   Neurological:      Mental Status: Alert.         Lab Review:   Lab Results   Component Value Date     04/11/2024    K 4.4 04/11/2024     04/11/2024    CO2 28 04/11/2024    BUN 16 04/11/2024    CREATININE 0.72 04/11/2024    GLUCOSE 173 (H) 04/11/2024    CALCIUM 9.8 04/11/2024     Lab Results   Component Value Date    CHOL 230 (H) 04/11/2024    TRIG 195 (H) 04/11/2024    HDL 50.5 04/11/2024       Assessment:    1.  Coronary artery disease.  Status post angioplasty and stenting 1 year ago.  Will reduce her to single antiplatelet therapy and discontinue aspirin.    2.  Hypertension.  Will discontinue amlodipine and start losartan 100 mg daily.    3.  Hyperlipidemia.  Severely elevated lipid profiles but she had stopped her statin therapy.  On statin therapy her lipid profiles are very good.    4.  Preoperative evaluation.  Cleared for the operative procedure with an acceptable cardiac risk.    She lives in [place].  She will be following up with a cardiologist at [facility].

## 2024-05-31 ENCOUNTER — DOCUMENTATION (OUTPATIENT)
Dept: PRIMARY CARE | Facility: CLINIC | Age: 74
End: 2024-05-31

## 2024-05-31 ENCOUNTER — LAB (OUTPATIENT)
Dept: LAB | Facility: LAB | Age: 74
End: 2024-05-31
Payer: MEDICARE

## 2024-05-31 ENCOUNTER — PRE-ADMISSION TESTING (OUTPATIENT)
Dept: PREADMISSION TESTING | Facility: HOSPITAL | Age: 74
End: 2024-05-31
Payer: MEDICARE

## 2024-05-31 ENCOUNTER — TELEPHONE (OUTPATIENT)
Dept: PRIMARY CARE | Facility: CLINIC | Age: 74
End: 2024-05-31

## 2024-05-31 VITALS
HEIGHT: 67 IN | WEIGHT: 159.9 LBS | DIASTOLIC BLOOD PRESSURE: 74 MMHG | SYSTOLIC BLOOD PRESSURE: 145 MMHG | OXYGEN SATURATION: 99 % | HEART RATE: 68 BPM | BODY MASS INDEX: 25.1 KG/M2 | TEMPERATURE: 98.6 F

## 2024-05-31 DIAGNOSIS — E11.9 TYPE 2 DIABETES MELLITUS WITHOUT COMPLICATION, WITHOUT LONG-TERM CURRENT USE OF INSULIN (MULTI): ICD-10-CM

## 2024-05-31 DIAGNOSIS — Z01.818 PRE-OP EXAM: ICD-10-CM

## 2024-05-31 DIAGNOSIS — Z79.01 CHRONIC ANTICOAGULATION: ICD-10-CM

## 2024-05-31 DIAGNOSIS — E11.9 TYPE 2 DIABETES MELLITUS WITHOUT COMPLICATION, UNSPECIFIED WHETHER LONG TERM INSULIN USE (MULTI): Primary | ICD-10-CM

## 2024-05-31 DIAGNOSIS — Z01.818 PRE-OP EXAM: Primary | ICD-10-CM

## 2024-05-31 LAB
ABO GROUP (TYPE) IN BLOOD: NORMAL
ANTIBODY SCREEN: NORMAL
EST. AVERAGE GLUCOSE BLD GHB EST-MCNC: 203 MG/DL
HBA1C MFR BLD: 8.7 %
INR PPP: 1 (ref 0.9–1.2)
PROTHROMBIN TIME: 10.9 SECONDS (ref 9.3–12.7)
RH FACTOR (ANTIGEN D): NORMAL

## 2024-05-31 PROCEDURE — 86850 RBC ANTIBODY SCREEN: CPT

## 2024-05-31 PROCEDURE — 85610 PROTHROMBIN TIME: CPT

## 2024-05-31 PROCEDURE — 86900 BLOOD TYPING SEROLOGIC ABO: CPT

## 2024-05-31 PROCEDURE — 87081 CULTURE SCREEN ONLY: CPT | Mod: WESLAB

## 2024-05-31 PROCEDURE — 83036 HEMOGLOBIN GLYCOSYLATED A1C: CPT

## 2024-05-31 PROCEDURE — 86901 BLOOD TYPING SEROLOGIC RH(D): CPT

## 2024-05-31 PROCEDURE — 36415 COLL VENOUS BLD VENIPUNCTURE: CPT

## 2024-05-31 RX ORDER — CHLORHEXIDINE GLUCONATE ORAL RINSE 1.2 MG/ML
SOLUTION DENTAL
Qty: 473 ML | Refills: 0 | Status: SHIPPED | OUTPATIENT
Start: 2024-05-31 | End: 2024-06-05

## 2024-05-31 ASSESSMENT — ENCOUNTER SYMPTOMS
DIZZINESS: 1
SHORTNESS OF BREATH: 1
ARTHRALGIAS: 1

## 2024-05-31 ASSESSMENT — DUKE ACTIVITY SCORE INDEX (DASI)
CAN YOU DO MODERATE WORK AROUND THE HOUSE LIKE VACUUMING, SWEEPING FLOORS OR CARRYING GROCERIES: YES
CAN YOU PARTICIPATE IN STRENOUS SPORTS LIKE SWIMMING, SINGLES TENNIS, FOOTBALL, BASKETBALL, OR SKIING: NO
CAN YOU DO HEAVY WORK AROUND THE HOUSE LIKE SCRUBBING FLOORS OR LIFTING AND MOVING HEAVY FURNITURE: NO
CAN YOU TAKE CARE OF YOURSELF (EAT, DRESS, BATHE, OR USE TOILET): YES
CAN YOU WALK A BLOCK OR TWO ON LEVEL GROUND: YES
CAN YOU DO LIGHT WORK AROUND THE HOUSE LIKE DUSTING OR WASHING DISHES: YES
DASI METS SCORE: 6.3
CAN YOU HAVE SEXUAL RELATIONS: YES
TOTAL_SCORE: 28.7
CAN YOU RUN A SHORT DISTANCE: NO
CAN YOU CLIMB A FLIGHT OF STAIRS OR WALK UP A HILL: YES
CAN YOU PARTICIPATE IN MODERATE RECREATIONAL ACTIVITIES LIKE GOLF, BOWLING, DANCING, DOUBLES TENNIS OR THROWING A BASEBALL OR FOOTBALL: NO
CAN YOU WALK INDOORS, SUCH AS AROUND YOUR HOUSE: YES
CAN YOU DO YARD WORK LIKE RAKING LEAVES, WEEDING OR PUSHING A MOWER: YES

## 2024-05-31 ASSESSMENT — PAIN SCALES - GENERAL: PAINLEVEL_OUTOF10: 0 - NO PAIN

## 2024-05-31 ASSESSMENT — LIFESTYLE VARIABLES: SMOKING_STATUS: NONSMOKER

## 2024-05-31 ASSESSMENT — PAIN - FUNCTIONAL ASSESSMENT: PAIN_FUNCTIONAL_ASSESSMENT: 0-10

## 2024-05-31 NOTE — TELEPHONE ENCOUNTER
Wants to speak to you  [phone]  Supposed to have carotid artery surg but her sugar is too high   She wanted to make an appt with you but you are booked so she is in with DR Ho but wants to speak to you  [phone]

## 2024-05-31 NOTE — PROGRESS NOTES
View All Conversations on this Encounter    Called patient back, 10 minutes time spent addressing her case advise she does have 90% or higher on the right vascular wants to do a carotid endarterectomy they canceled the surgery today because her A1c is uncontrolled severely at 8.7% vascular surgery wants the goal less than 7% prior to surgery I advised the patient of this  She refused Ozempic in the past, at this time she is agreeable so I told her to start Ozempic 0.25 mg once a week for the next 4 weeks and then follow-up with me in 1 month  I also advised her to follow-up with endocrinology, stat referral for tighter A1c control to get less than 7% given this is a perioperative state and time is very prudent to get this number lower they may even decide to put her on insulin for more aggressive control I advised her to take 3 months for the A1c to change so that is the overall timeframe were looking at here  I gave her the number to call endocrinology for follow-up  Otherwise maintain the Jardiance for now  She voiced understanding  She should follow-up with me as planned July 2; she can cancel the appointment with Dr. Stahl as she understands this plan of care as outlined above            Jada Anne routed conversation to You1 hour ago (3:21 PM)     Jada Buik1 hour ago (3:21 PM)       Wants to speak to you  [phone]  Supposed to have carotid artery surg but her sugar is too high   She wanted to make an appt with you but you are booked so she is in with DR Ho but wants to speak to you  [phone]         Note         Mare Ching [phone]  Jada Mchugh hour ago (3:19 PM)           Recent Patient Communication     Last Update Reason Specialty     Today -- Primary Care     Do [id number] Primcare1 Griffin Baltazar Open     3 weeks ago Med Refill Cardiology     Do [id number] Card1 Irvin Fan Closed     4 weeks ago Med Refill Primary Care     Do [id number] Primcare1 Marilu Weldon      4 weeks ago Results Primary Care     Do [id number] Primcare1 Griffin Baltazar Closed

## 2024-05-31 NOTE — PROGRESS NOTES
Ok , and I will recommend endocrine referral if still uncontrolled in order to optimize patient further thank you   ===View-only below this line===  ----- Message -----  From: Jenna Chavez MD  Sent: 5/31/2024  12:06 PM EDT  To: Griffin Baltazar DO; *  Subject: RE: 8.1 HGBA1c pre op                            If recheck is too high will need to postpone and reschedule.  ----- Message -----  From: Griffin Baltazar DO  Sent: 5/31/2024  12:06 PM EDT  To: Jenna Chavez MD; *  Subject: RE: 8.1 HGBA1c pre op                            Agree that would be ideal, on chart review patient refused ozempic when offered to help reach that goal .   ----- Message -----  From: RUY Velez-RENEE  Sent: 5/31/2024  11:51 AM EDT  To: Jenna Chavez MD; Griffin Baltazar DO  Subject: 8.1 HGBA1c pre op                                She is to have right cea on June 5, 2024,   anesthesia recommends HGBA1C below 7.5 for vascular surgeries, just an FYI, I did repeat one today.

## 2024-05-31 NOTE — PREPROCEDURE INSTRUCTIONS
Medication List            Accurate as of May 31, 2024 10:50 AM. Always use your most recent med list.                   cholecalciferol 25 MCG (1000 UT) capsule  Commonly known as: Vitamin D3  Take 1 capsule (25 mcg) by mouth once daily.  Medication Adjustments for Surgery: Stop 7 days before surgery     clopidogrel 75 mg tablet  Commonly known as: Plavix  TAKE ONE TABLET BY MOUTH EVERY DAY  Medication Adjustments for Surgery: Take morning of surgery with sip of water, no other fluids     doxycycline 100 mg tablet  Commonly known as: Vibra-Tabs  Notes to patient: Not taking     econazole nitrate 1 % cream  Medication Adjustments for Surgery: Other (Comment)  Notes to patient: HOLD DAY OF SURGERY     empagliflozin 25 mg  Commonly known as: Jardiance  Take 1 tablet (25 mg) by mouth once daily.  Medication Adjustments for Surgery: Stop 3 days before surgery  Notes to patient: LAST DOSE 6/1/24        levothyroxine 100 mcg tablet  Commonly known as: Synthroid, Levoxyl  Take 1 tablet (100 mcg) by mouth once daily in the morning. Take before meals.  Medication Adjustments for Surgery: Take morning of surgery with sip of water, no other fluids     LORazepam 0.5 mg tablet  Commonly known as: Ativan  Take 1 tablet (0.5 mg) by mouth 1 time for 1 dose. 30 minutes prior to MRI  Notes to patient: NOT TAKING     losartan 100 mg tablet  Commonly known as: Cozaar  Take 1 tablet (100 mg) by mouth once daily.  Medication Adjustments for Surgery: Other (Comment)  Notes to patient: HOLD THE MORNING OF SURGERY     metoprolol succinate XL 25 mg 24 hr tablet  Commonly known as: Toprol-XL  Take 1 tablet (25 mg) by mouth once daily. Do not crush or chew.  Medication Adjustments for Surgery: Take morning of surgery with sip of water, no other fluids     nystatin 100,000 unit/mL suspension  Commonly known as: Mycostatin  Medication Adjustments for Surgery: Other (Comment)  Notes to patient: HOLD DAY OF SURGERY     rosuvastatin 40 mg  tablet  Commonly known as: Crestor  TAKE ONE TABLET BY MOUTH EVERY DAY  Medication Adjustments for Surgery: Other (Comment)  Notes to patient: CAN TAKE THE NIGHT BEFORE SURGERY     Spiriva Respimat 2.5 mcg/actuation inhaler  Generic drug: tiotropium  Medication Adjustments for Surgery: Take morning of surgery with sip of water, no other fluids     Symbicort 160-4.5 mcg/actuation inhaler  Generic drug: budesonide-formoteroL  Medication Adjustments for Surgery: Take morning of surgery with sip of water, no other fluids     triamcinolone 0.1 % cream  Commonly known as: Kenalog  Medication Adjustments for Surgery: Other (Comment)  Notes to patient: HOLD DAY OF SURGERY     Ventolin HFA 90 mcg/actuation inhaler  Generic drug: albuterol  Medication Adjustments for Surgery: Take morning of surgery with sip of water, no other fluids                 Preoperative Fasting Guidelines    Why must I stop eating and drinking near surgery time?  With sedation, food or liquid in your stomach can enter your lungs causing serious complications  Increases nausea and vomiting    When do I need to stop eating and drinking before my surgery?  Do not eat any food or drink any liquids after midnight the night before your surgery/procedure.  You may have sips of water to take medications.    PAT DISCHARGE INSTRUCTIONS    Please call the Same Day Surgery (SDS) Department of the hospital where your procedure will be performed after 2:00 PM the day before your surgery. If you are scheduled on a Monday, or a Tuesday following a Monday holiday, you will need to call on the last business day prior to your surgery.    [facility]  [id number] [place] [address], [address] [phone]  [facility]  [address] [place] [id number] [phone]  [facility]  [address]  [address]  [phone]    Please let your surgeon know if:      You develop any open sores, shingles, burning or painful urination as these may increase your risk of an infection.   You no longer wish to have the surgery.   Any other personal circumstances change that may lead to the need to cancel or defer this surgery-such as being sick or getting admitted to any hospital within one week of your planned procedure.    Your contact details change, such as a change of address or phone number.    Starting now:     Please DO NOT drink alcohol or smoke for 24 hours before surgery. It is well known that quitting smoking can make a huge difference to your health and recovery from surgery. The longer you abstain from smoking, the better your chances of a healthy recovery. If you need help with quitting, call 4-800-QUIT-NOW to be connected to a trained counselor who will discuss the best methods to help you quit.     Before your surgery:    Please stop all supplements 7 days prior to surgery. Or as directed by your surgeon.   Please stop taking NSAID pain medicine such as Advil and Motrin 7 days before surgery.    If you develop any fever, cough, cold, rashes, cuts, scratches, scrapes, urinary symptoms or infection anywhere on your body (including teeth and gums) prior to surgery, please call your surgeon’s office as soon as possible. This may require treatment to reduce the chance of cancellation on the day of surgery.    The day before your surgery:   DIET- Please follow the diet instructions at the top of your packet.   Get a good night’s rest.  Use the special soap for bathing if you have been instructed to use one.    Scheduled surgery times may change and you will be notified if this occurs - please check your personal voicemail for any updates.     On the morning of surgery:   Wear comfortable, loose fitting clothes which open in the front. Please do not wear moisturizers, creams, lotions, makeup or perfume.    Please  bring with you to surgery:   Photo ID and insurance card   Current list of medicines and allergies   Pacemaker/ Defibrillator/Heart stent cards   CPAP machine and mask    Slings/ splints/ crutches   A copy of your complete advanced directive/DHPOA.    Please do NOT bring with you to surgery:   All jewelry and valuables should be left at home.   Prosthetic devices such as contact lenses, hearing aids, dentures, eyelash extensions, hairpins and body piercings must be removed prior to going in to the surgical suite.    After outpatient surgery:   A responsible adult MUST accompany you at the time of discharge and stay with you for 24 hours after your surgery. You may NOT drive yourself home after surgery.    Do not drive, operate machinery, make critical decisions or do activities that require co-ordination or balance until after a night’s sleep.   Do not drink alcoholic beverages for 24 hours.   Instructions for resuming your medications will be provided by your surgeon.    CALL YOUR DOCTOR AFTER SURGERY IF YOU HAVE:     Chills and/or a fever of 101° F or higher.    Redness, swelling, pus or drainage from your surgical wound or a bad smell from the wound.    Lightheadedness, fainting or confusion.    Persistent vomiting (throwing up) and are not able to eat or drink for 12 hours.    Three or more loose, watery bowel movements in 24 hours (diarrhea).   Difficulty or pain while urinating( after non-urological surgery)    Pain and swelling in your legs, especially if it is only on one side.    Difficulty breathing or are breathing faster than normal.    Any new concerning symptoms.      Patient Information: Pre-Operative Infection Prevention Measures     Why did I have my nose, under my arms, and groin swabbed?  The purpose of the swab is to identify Staphylococcus aureus inside your nose or on your skin.  The swab was sent to the laboratory for culture.  A positive swab/culture for Staphylococcus aureus is called  colonization or carriage.      What is Staphylococcus aureus?  Staphylococcus aureus, also known as “staph”, is a germ found on the skin or in the nose of healthy people.  Sometimes Staphylococcus aureus can get into the body and cause an infection.  This can be minor (such as pimples, boils, or other skin problems).  It might also be serious (such as a blood infection, pneumonia, or a surgical site infection).    What is Staphylococcus aureus colonization or carriage?  Colonization or carriage means that a person has the germ but is not sick from it.  These bacteria can be spread on the hands or when breathing or sneezing.    How is Staphylococcus aureus spread?  It is most often spread by close contact with a person or item that carries it.    What happens if my culture is positive for Staphylococcus aureus?  Your doctor/medical team will use this information to guide any antibiotic treatment which may be necessary.  Regardless of the culture results, we will clean the inside of your nose with a betadine swab just before you have your surgery.      Will I get an infection if I have Staphylococcus aureus in my nose or on my skin?  Anyone can get an infection with Staphylococcus aureus.  However, the best way to reduce your risk of infection is to follow the instructions provided to you for the use of your CHG soap and dental rinse.        Patient Information: Oral/Dental Rinse    What is oral/dental rinse?   It is a mouthwash. It is a way of cleaning the mouth with a germ-killing solution before your surgery.  The solution contains chlorhexidine, commonly known as CHG.   It is used inside the mouth to kill a bacteria known as Staphylococcus aureus.  Let your doctor know if you are allergic to Chlorhexidine.    Why do I need to use CHG oral/dental rinse?  The CHG oral/dental rinse helps to kill a bacteria in your mouth known as Staphylococcus aureus.     This reduces the risk of infection at the surgical site.       Using your CHG oral/dental rinse  STEPS:  Use your CHG oral/dental rinse after you brush your teeth the night before (at bedtime) and the morning of your surgery.  Follow all directions on your prescription label.    Use the cap on the container to measure 15ml   Swish (gargle if you can) the mouthwash in your mouth for at least 30 seconds, (do not swallow) and spit out  After you use your CHG rinse, do not rinse your mouth with water, drink or eat.  Please refer to the prescription label for the appropriate time to resume oral intake      What side effects might I have using the CHG oral/dental rinse?  CHG rinse will stick to plaque on the teeth.  Brush and floss just before use.  Teeth brushing will help avoid staining of plaque during use.      Patient Information: Home Preoperative Antibacterial Shower      What is a home preoperative antibacterial shower?  This shower is a way of cleaning the skin with a germ-killing solution before surgery.  The solution contains chlorhexidine, commonly known as CHG.  CHG is a skin cleanser with germ-killing ability.  Let your doctor know if you are allergic to chlorhexidine.    Why do I need to take a preoperative antibacterial shower?  Skin is not sterile.  It is best to try to make your skin as free of germs as possible before surgery.  Proper cleansing with a germ-killing soap before surgery can lower the number of germs on your skin.  This helps to reduce the risk of infection at the surgical site.  Following the instructions listed below will help you prepare your skin for surgery.      How do I use the solution?  Steps:  Begin using your CHG soap 5 days before your scheduled surgery on _____START WASH 6/1/24_____.    First, wash and rinse your hair using the CHG soap. Keep CHG soap away from ear canals and eyes.  Rinse completely, do not condition.  Hair extensions should be removed.  Wash your face with your normal soap and rinse.    Apply the CHG solution to a  clean wet washcloth.  Turn the water off or move away from the water spray to avoid premature rinsing of the CHG soap as you are applying.   Firmly lather your entire body from the neck down.  Do not use on your face.  Pay special attention to the area(s) where your incision(s) will be located unless they are on your face.  Avoid scrubbing your skin too hard.  The important point is to have the CHG soap sit on your skin for 3 minutes.    When the 3 minutes are up, turn on the water and rinse the CHG solution off your body completely.   DO NOT wash with regular soap after you have used the CHG soap solution  Pat yourself dry with a clean, freshly-laundered towel.  DO NOT apply powders, deodorants, or lotions.  Dress in clean, freshly laundered nightclothes.    Be sure to sleep with clean, freshly laundered sheets.  Be aware that CHG will cause stains on fabrics; if you wash them with bleach after use.  Rinse your washcloth and other linens that have contact with CHG completely.  Use only non-chlorine detergents to launder the items used.   The morning of surgery is the fifth day.  Repeat the above steps and dress in clean comfortable clothing     Whom should I contact if I have any questions regarding the use of CHG soap?  Call the [facility] at [phone] if you have any questions.

## 2024-05-31 NOTE — CPM/PAT H&P
"CPM/PAT Evaluation       Name: Mare JULIETTE Ching (Mare Ching)  /Age: 1950/73 y.o.     In-Person       Chief Complaint: right carotid blocked    HPI In 2024 saw  for \"head pain\" , did testing, found right carotid blocked, plan to have right cea on 2024.  Denies any chest pain, headaches , does have occasional dizziness and some sob with exertion ( copd).     Past Medical History:   Diagnosis Date    Abdominal bloating 2023    Acute bacterial bronchitis 2023    Acute UTI 2023    Angina pectoris (CMS-[facility])     resolved per cardiology last note    Body mass index (BMI) 25.0-25.9, adult     BMI 25.0-25.9,adult    Body mass index (BMI) 26.0-26.9, adult     BMI 26.0-26.9,adult    Candidal vaginitis 2023    Chest pain, atypical 2023    Chest pain, midsternal 2023    Chronic obstructive pulmonary disease, unspecified (Multi) 07/15/2021    COPD (chronic obstructive pulmonary disease)    Coronary artery disease     Delayed emergence from general anesthesia     Dysuria 2023    Flank pain 2023    Hip pain, right 2023    History of colonoscopy 2024    History of substance dependence (Multi) 2024    Hyperlipidemia, unspecified 10/03/2022    Hyperlipidemia    Hypertension     Hypothyroidism     Hypothyroidism, unspecified 2022    Hypothyroidism    Increased urinary frequency 2023    Other conditions influencing health status 2015    Mammogram normal    Other specified health status     No pertinent past surgical history    Other specified health status 2015    No pertinent past medical history    Other specified postprocedural states 2015    H/O colonoscopy    Personal history of other diseases of the respiratory system     History of asthma    PONV (postoperative nausea and vomiting)     Proteinuria 2023    Recurrent urinary tract infection 2023    Sialoadenitis 2023    SOB (shortness " of breath) 2023    Type 2 diabetes mellitus without complications (Multi) 10/11/2022    Diabetes mellitus    Urinary urgency 2023       Past Surgical History:   Procedure Laterality Date    CARDIAC CATHETERIZATION N/A 10/23/2023    Procedure: Left Heart Cath, With LV (00695);  Surgeon: Manpreet Garg MD;  Location: [facility] Cardiac Cath Lab;  Service: Cardiovascular;  Laterality: N/A;    HYSTERECTOMY  2015    Hysterectomy    OTHER SURGICAL HISTORY  2022    Tubal ligation    OTHER SURGICAL HISTORY  2020     section    OTHER SURGICAL HISTORY  2015    Surgery Excision Of Parotid Tumor/Gland       Patient Sexual activity questions deferred to the physician.    Family History   Problem Relation Name Age of Onset    Cancer Mother      Diabetes Mother      Pancreatic cancer Mother      Stroke Father          recurrent    Cancer Father      Other (urinary cancer) Father      Other (cardiac disorder) Other         Allergies   Allergen Reactions    Adhesive Tape-Silicones Other     blisters       Prior to Admission medications    Medication Sig Start Date End Date Taking? Authorizing Provider   cholecalciferol (Vitamin D3) 25 MCG (1000 UT) capsule Take 1 capsule (25 mcg) by mouth once daily. 24 Yes Griffin Baltazar, DO   clopidogrel (Plavix) 75 mg tablet TAKE ONE TABLET BY MOUTH EVERY DAY 24  Yes Manpreet Garg MD   econazole nitrate 1 % cream apply 1-2 times daily 3/27/19  Yes Historical Provider, MD   empagliflozin (Jardiance) 25 mg Take 1 tablet (25 mg) by mouth once daily. 24 Yes Griffin Lorenzine, DO   levothyroxine (Synthroid, Levoxyl) 100 mcg tablet Take 1 tablet (100 mcg) by mouth once daily in the morning. Take before meals. 24  Yes Griffin Lorenzine, DO   losartan (Cozaar) 100 mg tablet Take 1 tablet (100 mg) by mouth once daily. 24 Yes Irvin Fan MD   metoprolol succinate XL (Toprol-XL) 25 mg 24 hr tablet Take 1 tablet (25  mg) by mouth once daily. Do not crush or chew. 11/12/23 11/11/24 Yes Irvin Fan MD   rosuvastatin (Crestor) 40 mg tablet TAKE ONE TABLET BY MOUTH EVERY DAY 5/6/24  Yes Irvin Fan MD   Spiriva Respimat 2.5 mcg/actuation inhaler Inhale. 3/22/16  Yes Historical Provider, MD   Symbicort 160-4.5 mcg/actuation inhaler Inhale. 3/22/16  Yes Historical Provider, MD   Ventolin HFA 90 mcg/actuation inhaler  4/13/23  Yes Historical Provider, MD   blood sugar diagnostic (Accu-Chek Guide test strips) strip USE TO TEST BLOOD SUGAR 3 TIMES DAILY 5/2/24   Griffin Baltazar DO   chlorhexidine (Peridex) 0.12 % solution Use as directed 5/31/24 6/5/24  Marcy Tavarez, APRN-CNP   doxycycline (Vibra-Tabs) 100 mg tablet  4/1/24   Historical Provider, MD   lancets misc Use 3 times daily as directed    Historical Provider, MD   LORazepam (Ativan) 0.5 mg tablet Take 1 tablet (0.5 mg) by mouth 1 time for 1 dose. 30 minutes prior to MRI 4/4/24 4/4/24  Griffin Baltazar DO   nystatin (Mycostatin) 100,000 unit/mL suspension  4/1/24   Historical Provider, MD   triamcinolone (Kenalog) 0.1 % cream  7/15/22   Historical Provider, MD   amLODIPine (Norvasc) 2.5 mg tablet TAKE ONE TABLET BY MOUTH ONCE DAILY. 4/11/24 5/29/24  Irvin Fan MD   aspirin 81 mg EC tablet Take 1 tablet (81 mg) by mouth once daily. 5/19/23 5/29/24  Historical Provider, MD   cholecalciferol (Vitamin D-3) 50 mcg (2,000 unit) capsule Take 2 capsules (100 mcg) by mouth once daily. 6/9/17 5/31/24  Historical Provider, MD   fluconazole (Diflucan) 150 mg tablet 1 tablet daily for 3 days.  Patient not taking: Reported on 5/14/2024 5/2/24 5/29/24  Griffin Baltazar DO   magnesium 250 mg tablet Take 1 tablet (250 mg) by mouth once daily. 5/19/23 5/31/24  Historical Provider, MD   methylPREDNISolone (Medrol Dospak) 4 mg tablets  4/1/24 5/31/24  Historical Provider, MD   metoprolol succinate XL (Toprol-XL) 25 mg 24 hr tablet TAKE ONE TABLET BY MOUTH EVERY DAY  Patient not taking:  Reported on 5/14/2024 11/12/23 5/29/24  Irvin Fan MD      Social History     Social History Narrative    Not on file      Labs from 4/2024 reviewed,  , rechecking hemoglobin A1c  Cardiology clearance done 5/2024:  Mita Note:   Cardiac catheterization performed on October 23, 2023 showed a proximal 50% left anterior descending coronary artery stenosis.  This was not significant by FFR studies.  Mild disease in the mid to distal left anterior descending coronary artery the circumflex had mild disease.  Medical therapy was recommended.    In April 2023, she underwent cardiac catheterization angioplasty and stent placement of a 90% left anterior descending coronary artery stenosis.  Assessment:     1.  Coronary artery disease.  Status post angioplasty and stenting 1 year ago.  Will reduce her to single antiplatelet therapy and discontinue aspirin.     2.  Hypertension.  Will discontinue amlodipine and start losartan 100 mg daily.     3.  Hyperlipidemia.  Severely elevated lipid profiles but she had stopped her statin therapy.  On statin therapy her lipid profiles are very good.     4.  Preoperative evaluation.  Cleared for the operative procedure with an acceptable cardiac risk.         Review of Systems   Respiratory:  Positive for shortness of breath.    Musculoskeletal:  Positive for arthralgias.   Neurological:  Positive for dizziness.   All other systems reviewed and are negative.       Physical Exam  Vitals reviewed.   Constitutional:       Appearance: Normal appearance.   HENT:      Head: Normocephalic.   Neck:      Vascular: Carotid bruit present.   Cardiovascular:      Rate and Rhythm: Normal rate and regular rhythm.      Pulses: Normal pulses.      Heart sounds: Normal heart sounds.   Pulmonary:      Effort: Pulmonary effort is normal.      Breath sounds: Normal breath sounds.   Abdominal:      General: Bowel sounds are normal.      Palpations: Abdomen is soft.   Musculoskeletal:         General: Normal  range of motion.      Cervical back: Normal range of motion.   Skin:     General: Skin is warm and dry.   Neurological:      General: No focal deficit present.      Mental Status: She is alert and oriented to person, place, and time.   Psychiatric:         Mood and Affect: Mood normal.         Behavior: Behavior normal.          PAT AIRWAY:   Airway:     Mallampati::  III    TM distance::  <3 FB    Neck ROM::  Full      Visit Vitals  /74   Pulse 68   Temp 37 °C (98.6 °F) (Temporal)       DASI Risk Score      Flowsheet Row Most Recent Value   DASI SCORE 28.7   METS Score (Will be calculated only when all the questions are answered) 6.3          Caprini DVT Assessment      Flowsheet Row Most Recent Value   DVT Score 8   Current Status COPD, Major surgery planned, including arthroscopic and laproscopic (1-2 hours)   History Prior major surgery, COPD   Age 60-75 years   BMI 30 or less          Modified Frailty Index    No data to display       CHADS2 Stroke Risk  Current as of 35 minutes ago        N/A 3 to 100%: High Risk   2 to < 3%: Medium Risk   0 to < 2%: Low Risk     Last Change: N/A          This score determines the patient's risk of having a stroke if the patient has atrial fibrillation.        This score is not applicable to this patient. Components are not calculated.          Revised Cardiac Risk Index      Flowsheet Row Most Recent Value   Revised Cardiac Risk Calculator 2          Apfel Simplified Score      Flowsheet Row Most Recent Value   Apfel Simplified Score Calculator 3          Risk Analysis Index Results This Encounter    No data found in the last 1 encounters.       Stop Bang Score      Flowsheet Row Most Recent Value   Do you snore loudly? 0   Do you often feel tired or fatigued after your sleep? 0   Has anyone ever observed you stop breathing in your sleep? 0   Do you have or are you being treated for high blood pressure? 1   Recent BMI (Calculated) 25   Is BMI greater than 35 kg/m2? 0=No    Age older than 50 years old? 1=Yes   Is your neck circumference greater than 17 inches (Male) or 16 inches (Female)? 0   Gender - Male 0=No   STOP-BANG Total Score 2            Assessment and Plan:   Carotid stenosis- planned Right CEA, ordered type and screen, verab, inr,  per vascular protocol she continues plavix and has stopped her aspirin  CAD- Status post angioplasty and stenting 1 year ago, cleared by cardiology Mita lovelace  [facility]  HTN- managed  DM- not controlled, HGBA1C is at 8.1, will repeat today as anesthesia recommends below 7.5 for vascular procedures, message sent to surgeon and her pcp.  PONV and Delayed emergence she reports from previous anesthesia  COPD- rescue inhaler once a month, remains on other inhalers  ASA- 3  Ariscat- low

## 2024-06-02 LAB — STAPHYLOCOCCUS SPEC CULT: NORMAL

## 2024-06-05 ENCOUNTER — APPOINTMENT (OUTPATIENT)
Dept: PRIMARY CARE | Facility: CLINIC | Age: 74
End: 2024-06-05
Payer: MEDICARE

## 2024-06-10 ENCOUNTER — TELEPHONE (OUTPATIENT)
Dept: CARDIOLOGY | Facility: CLINIC | Age: 74
End: 2024-06-10
Payer: MEDICARE

## 2024-06-10 DIAGNOSIS — I10 ESSENTIAL HYPERTENSION: ICD-10-CM

## 2024-06-10 NOTE — TELEPHONE ENCOUNTER
"Pt called to report she is having side effects from new medication started last week. Dr. Fan stopped Amlodipine 2.5mg daily and started Losartan 100mg daily on 5/29/24.    Per pt, she has been taking Losartan 100mg daily x1 week. Per pt, she is lightheaded, vision is off, and \"just doesn't feel right\".     Home BP's: 131-146/62-85, one lower reading of 129/78. HR 63-78.    Meds: Losartan 100mg daily, Metoprolol Succinate 25mg daily.    **Pt states she stopped Losartan today and resumed Amlodipine 2.5mg daily.    Pt called on 6/11/24 - she is having a tooth extraction and asking how long to hold Clopidogrel?      Please advise. Thanks.  "

## 2024-06-12 RX ORDER — AMLODIPINE BESYLATE 2.5 MG/1
2.5 TABLET ORAL DAILY
COMMUNITY
Start: 2024-06-12

## 2024-06-13 ENCOUNTER — TELEPHONE (OUTPATIENT)
Dept: PRIMARY CARE | Facility: CLINIC | Age: 74
End: 2024-06-13
Payer: MEDICARE
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
## 2024-07-02 ENCOUNTER — APPOINTMENT (OUTPATIENT)
Dept: CARDIOLOGY | Facility: HOSPITAL | Age: 74
End: 2024-07-02
Payer: MEDICARE

## 2024-07-02 ENCOUNTER — APPOINTMENT (OUTPATIENT)
Dept: PRIMARY CARE | Facility: CLINIC | Age: 74
End: 2024-07-02
Payer: MEDICARE

## 2024-07-02 VITALS — WEIGHT: 156 LBS | DIASTOLIC BLOOD PRESSURE: 70 MMHG | BODY MASS INDEX: 24.43 KG/M2 | SYSTOLIC BLOOD PRESSURE: 126 MMHG

## 2024-07-02 DIAGNOSIS — E11.9 TYPE 2 DIABETES MELLITUS WITHOUT COMPLICATION, UNSPECIFIED WHETHER LONG TERM INSULIN USE (MULTI): ICD-10-CM

## 2024-07-02 DIAGNOSIS — Z00.00 HEALTHCARE MAINTENANCE: Primary | ICD-10-CM

## 2024-07-02 DIAGNOSIS — D49.2 GROWTH OF EYELID: ICD-10-CM

## 2024-07-02 DIAGNOSIS — Z91.89 AT RISK FOR IMPAIRED HEALTH MAINTENANCE: ICD-10-CM

## 2024-07-02 DIAGNOSIS — E55.9 VITAMIN D DEFICIENCY: ICD-10-CM

## 2024-07-02 DIAGNOSIS — B37.9 CANDIDIASIS: ICD-10-CM

## 2024-07-02 PROCEDURE — 99214 OFFICE O/P EST MOD 30 MIN: CPT | Performed by: INTERNAL MEDICINE

## 2024-07-02 PROCEDURE — 1158F ADVNC CARE PLAN TLK DOCD: CPT | Performed by: INTERNAL MEDICINE

## 2024-07-02 PROCEDURE — 3074F SYST BP LT 130 MM HG: CPT | Performed by: INTERNAL MEDICINE

## 2024-07-02 PROCEDURE — 1157F ADVNC CARE PLAN IN RCRD: CPT | Performed by: INTERNAL MEDICINE

## 2024-07-02 PROCEDURE — 3078F DIAST BP <80 MM HG: CPT | Performed by: INTERNAL MEDICINE

## 2024-07-02 PROCEDURE — 3050F LDL-C >= 130 MG/DL: CPT | Performed by: INTERNAL MEDICINE

## 2024-07-02 PROCEDURE — 1036F TOBACCO NON-USER: CPT | Performed by: INTERNAL MEDICINE

## 2024-07-02 PROCEDURE — 1123F ACP DISCUSS/DSCN MKR DOCD: CPT | Performed by: INTERNAL MEDICINE

## 2024-07-02 PROCEDURE — 3052F HG A1C>EQUAL 8.0%<EQUAL 9.0%: CPT | Performed by: INTERNAL MEDICINE

## 2024-07-02 RX ORDER — FLUCONAZOLE 150 MG/1
450 TABLET ORAL ONCE
Qty: 3 TABLET | Refills: 5 | Status: SHIPPED | OUTPATIENT
Start: 2024-07-02 | End: 2024-07-02

## 2024-07-02 ASSESSMENT — PATIENT HEALTH QUESTIONNAIRE - PHQ9
2. FEELING DOWN, DEPRESSED OR HOPELESS: NOT AT ALL
SUM OF ALL RESPONSES TO PHQ9 QUESTIONS 1 AND 2: 0
1. LITTLE INTEREST OR PLEASURE IN DOING THINGS: NOT AT ALL

## 2024-07-02 NOTE — PROGRESS NOTES
Subjective   Patient ID: Mare Ching is a 73 y.o. female who presents for Follow-up and Med Refill.  HPI        female history COPD tobacco abuse diabetes hypothyroidism muscle cramps moderate CAD. UTI anxiety unstable angina s/p cath 4/2023 PCI, cath 10/2023 .889 ffr LAD lesion, right carotid stenosis    Patient states she is overall feeling very well she is working on her diet her blood sugars doing better on her own 112 she states she could not get Ozempic because of insurance issues they are trying to make her pay $200 she also has not yet seen endocrinology as ordered she wants to try it to do it on her own she is aware that the vascular wants her less than 7.5 prior to surgery A1c      Health Maintenance:      Colonoscopy: 2019      Mammogram: 2023      Pelvic/Pap:      Low dose chest CT:      Aorta duplex:      Optho:      Podiatry:        Vaccines:      Refer to Epic Vaccination Log        ROS:      General: denies fever/chills/weight loss      Head: denies HA/trauma/masses/dizziness      Eyes: Inferior eyelid growth for years no change in size denies vision change/loss of vision/blurry vision/diplopia/eye pain      Ears: denies hearing loss/tinnitus/otalgia/otorrhea      Nose: Green rhinorrhea denies nasal drainage/anosmia      Throat: Had tooth pulled recently had a dry socket was placed on antibiotics denies dysphagia/odynophagia      Lymphatics: denies lymph node swelling      Breast: denies masses/discharge/dimpling/skin changes      Cardiac: denies CP/palpitations/orthopnea/PND      Pulmonary: denies dyspnea/cough/wheezing      GI: denies abd pain/n/v/diarrhea/melena/hematochezia/hematemesis      : denies dysuria/hematuria/change frequency      Genital: denies genital discharge/lesions      Skin: denies rashes/lesions/masses      MSK: Chronic leg swelling for many years left greater than right mostly around the foot top of the foot denies weakness/swelling/edema/gait imbalance/pain       "Neuro: denies paresthesias/seizures/dysarthria      Psych: Severe claustrophobia with MRI denies depression/anxiety/suicidal or homicidal ideations            Objective   /70   Wt 70.8 kg (156 lb)   BMI 24.43 kg/m²      Physical Exam:     General: AO3, NAD     Head: atraumatic/NC     Eyes: Right inferior eyelid 1 mm clear papule without erythema fluctuance tenderness induration EOMI/PERRLA. Negative APD     Ears: TM pearly gray, EAC clear. No lesions or erythema     Nose: symmetric nares, no discharge     Throat: trachea midline, uvula midline pink mucosa. No thyromegaly     Lymphatics: no cervical/supraclavicular/ant or posterior cervical adenopathy/axillary/inguinal adenopathy     Breast: not examined     Chest: no deformity or tenderness to palpation     Pulm: CTA b/l, no wheeze/rhonchi/rales. nonlabored     Cardiac: RRR +s1s2, no m/r/g.      GI: soft, NT/ND. Normoactive Bsx4. No rebound/guarding.     Rectal: not examined     Genital: not examined     MSK:  5/5 strength UE LE. No edema/clubbing/cyanosis     Skin: no rashes/lesions     Vascular: 1+ palp DP PT radials b/l. Negative carotid bruit     Neuro: CNII-XII intact. No focal deficits. Reflexes 2/4 brachioradialis bicep tricep patellar achilles. Finger to nose intact.     Psych: appropriate mood/affect                    No results found for: \"BMPR1A\", \"CBCDIF\"    I offered another option for diabetes control but his Trulicity she deferred this option at this time states she only wants to try to do it on her own at this point    I recommended echocardiogram as well as venous reflux studies lower extremity given chronic leg swelling she refused states she is going to talk to cardiology to review this first  Assessment/Plan   Diagnoses and all orders for this visit:  Healthcare maintenance  -     Lipid Panel; Future  -     Vitamin D 25 hydroxy; Future  -     CBC and Auto Differential; Future  -     Comprehensive Metabolic Panel; Future  -     Thyroxine, " Free; Future  -     Thyroid Stimulating Hormone; Future  Type 2 diabetes mellitus without complication, unspecified whether long term insulin use (Multi)  -     empagliflozin (Jardiance) 25 mg; Take 1 tablet (25 mg) by mouth once daily.  -     Hemoglobin A1C; Future  Vitamin D deficiency  -     Vitamin D 25 hydroxy; Future  At risk for impaired health maintenance  -     CBC and Auto Differential; Future  Candidiasis  -     fluconazole (Diflucan) 150 mg tablet; Take 3 tablets (450 mg) by mouth 1 time for 1 dose. Take second dose in 1 week if symptoms are not resolved  Growth of eyelid  -     Referral to Dermatology    I recommend following up with endocrinology as ordered  Goal A1c less than 7.5%    Call and follow-up with vascular recommendations noted CEA    Follow-up with your new cardiologist as planned July 17        Go to ER for any worsening head pain headaches or other concerning symptoms      Call follow-up with ENT    Recheck A1c in 3 months  Patient requested full blood work again today orders placed  Screening blood work due February 2025    Thank you for making appointment today Mare    Please follow-up 3 months    Griffin Baltazar DO, MARY Baltazar DO

## 2024-07-09 DIAGNOSIS — I25.10 CAD IN NATIVE ARTERY: ICD-10-CM

## 2024-07-09 DIAGNOSIS — I10 ESSENTIAL HYPERTENSION: ICD-10-CM

## 2024-07-09 RX ORDER — CLOPIDOGREL BISULFATE 75 MG/1
75 TABLET ORAL DAILY
Qty: 90 TABLET | Refills: 0 | Status: SHIPPED | OUTPATIENT
Start: 2024-07-09

## 2024-07-10 ENCOUNTER — LAB (OUTPATIENT)
Dept: LAB | Facility: LAB | Age: 74
End: 2024-07-10
Payer: MEDICARE

## 2024-07-10 DIAGNOSIS — Z91.89 AT RISK FOR IMPAIRED HEALTH MAINTENANCE: ICD-10-CM

## 2024-07-10 DIAGNOSIS — Z00.00 HEALTHCARE MAINTENANCE: ICD-10-CM

## 2024-07-10 DIAGNOSIS — E11.9 TYPE 2 DIABETES MELLITUS WITHOUT COMPLICATION, UNSPECIFIED WHETHER LONG TERM INSULIN USE (MULTI): ICD-10-CM

## 2024-07-10 DIAGNOSIS — E55.9 VITAMIN D DEFICIENCY: ICD-10-CM

## 2024-07-10 LAB
25(OH)D3 SERPL-MCNC: 33 NG/ML (ref 30–100)
ALBUMIN SERPL BCP-MCNC: 4.6 G/DL (ref 3.4–5)
ALP SERPL-CCNC: 46 U/L (ref 33–136)
ALT SERPL W P-5'-P-CCNC: 26 U/L (ref 7–45)
ANION GAP SERPL CALC-SCNC: 17 MMOL/L (ref 10–20)
AST SERPL W P-5'-P-CCNC: 19 U/L (ref 9–39)
BASOPHILS # BLD AUTO: 0.04 X10*3/UL (ref 0–0.1)
BASOPHILS NFR BLD AUTO: 0.6 %
BILIRUB SERPL-MCNC: 0.6 MG/DL (ref 0–1.2)
BUN SERPL-MCNC: 21 MG/DL (ref 6–23)
CALCIUM SERPL-MCNC: 10.5 MG/DL (ref 8.6–10.6)
CHLORIDE SERPL-SCNC: 103 MMOL/L (ref 98–107)
CHOLEST SERPL-MCNC: 165 MG/DL (ref 0–199)
CHOLESTEROL/HDL RATIO: 3
CO2 SERPL-SCNC: 25 MMOL/L (ref 21–32)
CREAT SERPL-MCNC: 0.68 MG/DL (ref 0.5–1.05)
EGFRCR SERPLBLD CKD-EPI 2021: >90 ML/MIN/1.73M*2
EOSINOPHIL # BLD AUTO: 0.1 X10*3/UL (ref 0–0.4)
EOSINOPHIL NFR BLD AUTO: 1.5 %
ERYTHROCYTE [DISTWIDTH] IN BLOOD BY AUTOMATED COUNT: 15.1 % (ref 11.5–14.5)
EST. AVERAGE GLUCOSE BLD GHB EST-MCNC: 169 MG/DL
GLUCOSE SERPL-MCNC: 123 MG/DL (ref 74–99)
HBA1C MFR BLD: 7.5 %
HCT VFR BLD AUTO: 43.3 % (ref 36–46)
HDLC SERPL-MCNC: 55.8 MG/DL
HGB BLD-MCNC: 13.6 G/DL (ref 12–16)
IMM GRANULOCYTES # BLD AUTO: 0.02 X10*3/UL (ref 0–0.5)
IMM GRANULOCYTES NFR BLD AUTO: 0.3 % (ref 0–0.9)
LDLC SERPL CALC-MCNC: 78 MG/DL
LYMPHOCYTES # BLD AUTO: 1.61 X10*3/UL (ref 0.8–3)
LYMPHOCYTES NFR BLD AUTO: 24.5 %
MCH RBC QN AUTO: 27.6 PG (ref 26–34)
MCHC RBC AUTO-ENTMCNC: 31.4 G/DL (ref 32–36)
MCV RBC AUTO: 88 FL (ref 80–100)
MONOCYTES # BLD AUTO: 0.58 X10*3/UL (ref 0.05–0.8)
MONOCYTES NFR BLD AUTO: 8.8 %
NEUTROPHILS # BLD AUTO: 4.22 X10*3/UL (ref 1.6–5.5)
NEUTROPHILS NFR BLD AUTO: 64.3 %
NON HDL CHOLESTEROL: 109 MG/DL (ref 0–149)
NRBC BLD-RTO: 0 /100 WBCS (ref 0–0)
PLATELET # BLD AUTO: 246 X10*3/UL (ref 150–450)
POTASSIUM SERPL-SCNC: 4.6 MMOL/L (ref 3.5–5.3)
PROT SERPL-MCNC: 6.9 G/DL (ref 6.4–8.2)
RBC # BLD AUTO: 4.92 X10*6/UL (ref 4–5.2)
SODIUM SERPL-SCNC: 140 MMOL/L (ref 136–145)
T4 FREE SERPL-MCNC: 1.44 NG/DL (ref 0.78–1.48)
TRIGL SERPL-MCNC: 157 MG/DL (ref 0–149)
TSH SERPL-ACNC: 0.07 MIU/L (ref 0.44–3.98)
VLDL: 31 MG/DL (ref 0–40)
WBC # BLD AUTO: 6.6 X10*3/UL (ref 4.4–11.3)

## 2024-07-10 PROCEDURE — 80061 LIPID PANEL: CPT

## 2024-07-10 PROCEDURE — 84439 ASSAY OF FREE THYROXINE: CPT

## 2024-07-10 PROCEDURE — 85025 COMPLETE CBC W/AUTO DIFF WBC: CPT

## 2024-07-10 PROCEDURE — 83036 HEMOGLOBIN GLYCOSYLATED A1C: CPT

## 2024-07-10 PROCEDURE — 36415 COLL VENOUS BLD VENIPUNCTURE: CPT

## 2024-07-10 PROCEDURE — 80053 COMPREHEN METABOLIC PANEL: CPT

## 2024-07-10 PROCEDURE — 82306 VITAMIN D 25 HYDROXY: CPT

## 2024-07-10 PROCEDURE — 84443 ASSAY THYROID STIM HORMONE: CPT

## 2024-07-10 RX ORDER — METOPROLOL SUCCINATE 25 MG/1
TABLET, EXTENDED RELEASE ORAL
Qty: 90 TABLET | Refills: 3 | Status: SHIPPED | OUTPATIENT
Start: 2024-07-10 | End: 2025-07-10

## 2024-07-17 ENCOUNTER — OFFICE VISIT (OUTPATIENT)
Dept: CARDIOLOGY | Facility: HOSPITAL | Age: 74
End: 2024-07-17
Payer: MEDICARE

## 2024-07-17 VITALS
BODY MASS INDEX: 24.33 KG/M2 | WEIGHT: 155 LBS | DIASTOLIC BLOOD PRESSURE: 75 MMHG | SYSTOLIC BLOOD PRESSURE: 142 MMHG | HEART RATE: 68 BPM | HEIGHT: 67 IN

## 2024-07-17 DIAGNOSIS — I65.23 BILATERAL CAROTID ARTERY STENOSIS: ICD-10-CM

## 2024-07-17 DIAGNOSIS — E11.9 TYPE 2 DIABETES MELLITUS WITHOUT COMPLICATION, WITHOUT LONG-TERM CURRENT USE OF INSULIN (MULTI): ICD-10-CM

## 2024-07-17 DIAGNOSIS — I25.10 CAD IN NATIVE ARTERY: Primary | ICD-10-CM

## 2024-07-17 DIAGNOSIS — I10 ESSENTIAL HYPERTENSION: ICD-10-CM

## 2024-07-17 DIAGNOSIS — E78.00 PURE HYPERCHOLESTEROLEMIA: ICD-10-CM

## 2024-07-17 LAB
ATRIAL RATE: 68 BPM
P AXIS: 50 DEGREES
P OFFSET: 158 MS
P ONSET: 110 MS
PR INTERVAL: 196 MS
Q ONSET: 208 MS
QRS COUNT: 11 BEATS
QRS DURATION: 156 MS
QT INTERVAL: 472 MS
QTC CALCULATION(BAZETT): 501 MS
QTC FREDERICIA: 492 MS
R AXIS: -83 DEGREES
T AXIS: 30 DEGREES
T OFFSET: 444 MS
VENTRICULAR RATE: 68 BPM

## 2024-07-17 PROCEDURE — 1157F ADVNC CARE PLAN IN RCRD: CPT | Performed by: INTERNAL MEDICINE

## 2024-07-17 PROCEDURE — 3077F SYST BP >= 140 MM HG: CPT | Performed by: INTERNAL MEDICINE

## 2024-07-17 PROCEDURE — G2211 COMPLEX E/M VISIT ADD ON: HCPCS | Performed by: INTERNAL MEDICINE

## 2024-07-17 PROCEDURE — 3078F DIAST BP <80 MM HG: CPT | Performed by: INTERNAL MEDICINE

## 2024-07-17 PROCEDURE — 99215 OFFICE O/P EST HI 40 MIN: CPT | Performed by: INTERNAL MEDICINE

## 2024-07-17 PROCEDURE — 3008F BODY MASS INDEX DOCD: CPT | Performed by: INTERNAL MEDICINE

## 2024-07-17 PROCEDURE — 1036F TOBACCO NON-USER: CPT | Performed by: INTERNAL MEDICINE

## 2024-07-17 PROCEDURE — 3048F LDL-C <100 MG/DL: CPT | Performed by: INTERNAL MEDICINE

## 2024-07-17 PROCEDURE — 1159F MED LIST DOCD IN RCRD: CPT | Performed by: INTERNAL MEDICINE

## 2024-07-17 PROCEDURE — 3051F HG A1C>EQUAL 7.0%<8.0%: CPT | Performed by: INTERNAL MEDICINE

## 2024-07-17 PROCEDURE — 93005 ELECTROCARDIOGRAM TRACING: CPT | Performed by: INTERNAL MEDICINE

## 2024-07-17 PROCEDURE — 99205 OFFICE O/P NEW HI 60 MIN: CPT | Performed by: INTERNAL MEDICINE

## 2024-07-17 PROCEDURE — 1160F RVW MEDS BY RX/DR IN RCRD: CPT | Performed by: INTERNAL MEDICINE

## 2024-07-17 RX ORDER — EZETIMIBE 10 MG/1
10 TABLET ORAL DAILY
Qty: 90 TABLET | Refills: 3 | Status: SHIPPED | OUTPATIENT
Start: 2024-07-17 | End: 2025-07-17

## 2024-07-17 RX ORDER — AMLODIPINE BESYLATE 5 MG/1
5 TABLET ORAL DAILY
Qty: 90 TABLET | Refills: 3 | Status: SHIPPED | OUTPATIENT
Start: 2024-07-17 | End: 2025-07-17

## 2024-07-17 ASSESSMENT — ENCOUNTER SYMPTOMS
OCCASIONAL FEELINGS OF UNSTEADINESS: 0
LOSS OF SENSATION IN FEET: 0
DEPRESSION: 0

## 2024-07-17 NOTE — PROGRESS NOTES
Primary Care Physician: Griffin Baltazar DO  Date of Visit: 07/17/2024 10:20 AM EDT  Location of visit: [facility]     Chief Complaint:   Chief Complaint   Patient presents with    New Patient Visit     Former pt of Mita        HPI / Summary:   Mare Ching is a 73 y.o. female presents today to establish cardiovascular care.  She is a pleasant 73-year-old white female with a past medical history significant for coronary artery disease with a CT calcium score of 589 in 2019 with subsequent PCI to the LAD in the setting of unstable angina with preserved LV function, bilateral carotid artery disease, type II non-insulin-requiring diabetes, hypertension, hyperlipidemia, and COPD.  She has no particular complaints.  She walks up to 3000 steps daily without chest discomfort, jaw discomfort or arm discomfort.  She has stable chronic dyspnea on exertion when walking prolonged distances that has been present for many years.  She has chronic mild lower extremity edema and wears compression stockings.  She also notes occasional random episodes of lightheadedness without near syncope or syncope.  The patient denies chest pain,  palpitations, syncope, orthopnea, paroxysmal nocturnal dyspnea, or bleeding problems.    The patient has no prior history of cerebrovascular disease, venous thromboembolic disease,  peripheral arterial disease/claudication, or bleeding.    She presented in March 2022 with jaw discomfort and left arm discomfort.  She had a negative Lexiscan nuclear stress test.  This progressively worsened over the course of the year.  She subsequently had a cardiac catheterization April 2023 that showed a severe lesion in the mid LAD.  She underwent PCI of her LAD.  She had resolution of her jaw discomfort and left arm discomfort after PCI.  She then noted recurrent jaw discomfort and left arm discomfort in October 2023.  Repeat heart catheterization revealed a widely patent LAD stent.  She had residual  moderate disease in the ostium of the LAD and mid to distal LAD.  RFR was borderline abnormal distally at 0.89.  RFR in the mid vessel just distal to the stent was 0.92.  She was started on metoprolol and her jaw discomfort and arm discomfort resolved.    She has chronic hypertension and monitors her blood pressure at home and her systolic blood pressures are usually in the 120s to 140s.  At some point in the last year she was taken off of amlodipine and put on losartan 100 mg daily.  After starting losartan 100 mg she complained of severe lightheadedness and weakness.  Her losartan was then switched back to amlodipine 2.5 mg daily.  She has at least a 5-year history of diabetes and has not required insulin.  She was recommended to start Ozempic but it was too expensive.  She has a history of hyperlipidemia and has been on a statin for at least 4 years.  She was switched to rosuvastatin 40 mg approximately 2 years ago and has not had side effects.    She has a history of left parotidectomy, hysterectomy,  x 2, and bilateral tubal ligation.    She is allergic to surgical tape which causes a rash.    She quit smoking 25 years ago and has a 60-pack-year history of tobacco use.  She denies any alcohol or illicit drug use.  She is a retired .  She lives alone.    Her mother had a CABG in her 60s.  No family history of sudden death.          Past Medical History:   Diagnosis Date    Abdominal bloating 2023    Acute bacterial bronchitis 2023    Acute UTI 2023    Angina pectoris (CMS-[facility])     resolved per cardiology last note    Body mass index (BMI) 25.0-25.9, adult     BMI 25.0-25.9,adult    Body mass index (BMI) 26.0-26.9, adult     BMI 26.0-26.9,adult    Candidal vaginitis 2023    Chest pain, atypical 2023    Chest pain, midsternal 2023    Chronic obstructive pulmonary disease, unspecified (Multi) 07/15/2021    COPD (chronic obstructive pulmonary disease)     Coronary artery disease     Delayed emergence from general anesthesia     Dysuria 2023    Flank pain 2023    Hip pain, right 2023    History of colonoscopy 2024    History of substance dependence (Multi) 2024    Hyperlipidemia, unspecified 10/03/2022    Hyperlipidemia    Hypertension     Hypothyroidism     Hypothyroidism, unspecified 2022    Hypothyroidism    Increased urinary frequency 2023    Other conditions influencing health status 2015    Mammogram normal    Other specified health status     No pertinent past surgical history    Other specified health status 2015    No pertinent past medical history    Other specified postprocedural states 2015    H/O colonoscopy    Personal history of other diseases of the respiratory system     History of asthma    PONV (postoperative nausea and vomiting)     Proteinuria 2023    Recurrent urinary tract infection 2023    Sialoadenitis 2023    SOB (shortness of breath) 2023    Type 2 diabetes mellitus without complications (Multi) 10/11/2022    Diabetes mellitus    Urinary urgency 2023        Past Surgical History:   Procedure Laterality Date    CARDIAC CATHETERIZATION N/A 10/23/2023    Procedure: Left Heart Cath, With LV ([id number]);  Surgeon: Manpreet Garg MD;  Location: [facility] Cardiac [facility] Lab;  Service: Cardiovascular;  Laterality: N/A;    HYSTERECTOMY  2015    Hysterectomy    OTHER SURGICAL HISTORY  2022    Tubal ligation    OTHER SURGICAL HISTORY  2020     section    OTHER SURGICAL HISTORY  2015    Surgery Excision Of Parotid Tumor/Gland          Social History:   reports that she quit smoking about 24 years ago. Her smoking use included cigarettes. She started smoking about 54 years ago. She has a 60 pack-year smoking history. She has never used smokeless tobacco. She reports current alcohol use. She reports that she does not use drugs.     Family  "History:  family history includes Cancer in her father and mother; Diabetes in her mother; Pancreatic cancer in her mother; Stroke in her father; cardiac disorder in an other family member; urinary cancer in her father.      Allergies:  Allergies   Allergen Reactions    Adhesive Tape-Silicones Other     blisters       Outpatient Medications:  Current Outpatient Medications   Medication Instructions    amLODIPine (NORVASC) 2.5 mg, oral, Daily    blood sugar diagnostic (Accu-Chek Guide test strips) strip USE TO TEST BLOOD SUGAR 3 TIMES DAILY    cholecalciferol (VITAMIN D3) 25 mcg, oral, Daily    clopidogrel (PLAVIX) 75 mg, oral, Daily    empagliflozin (JARDIANCE) 25 mg, oral, Daily    lancets misc miscellaneous, Use 3 times daily as directed    levothyroxine (SYNTHROID, LEVOXYL) 100 mcg, oral, Daily before breakfast    metoprolol succinate XL (Toprol-XL) 25 mg 24 hr tablet TAKE ONE TABLET BY MOUTH ONCE DAILY. DO NOT CRUSH OR CHEW.    rosuvastatin (CRESTOR) 40 mg, oral, Daily    semaglutide 0.25 mg, subcutaneous, Once Weekly    Spiriva Respimat 2.5 mcg/actuation inhaler inhalation    Symbicort 160-4.5 mcg/actuation inhaler inhalation    triamcinolone (Kenalog) 0.1 % cream     Ventolin HFA 90 mcg/actuation inhaler        Physical Exam:  Vitals:    07/17/24 1014   BP: 142/75   BP Location: Right arm   Pulse: 68   Weight: 70.3 kg (155 lb)   Height: 1.702 m (5' 7\")     Wt Readings from Last 5 Encounters:   07/17/24 70.3 kg (155 lb)   07/02/24 70.8 kg (156 lb)   05/31/24 72.5 kg (159 lb 14.4 oz)   05/29/24 71.7 kg (158 lb)   05/14/24 71.9 kg (158 lb 8 oz)     Body mass index is 24.28 kg/m².     Left upper extremity blood pressure 144/66, right upper extremity blood pressure 136/66    General: Well-developed well-nourished in no acute distress  HEENT: Normocephalic atraumatic  Neck: Supple, JVP is normal negative hepatojugular reflux 2+ carotid pulses right carotid bruit  Pulmonary: Normal respiratory effort, clear to " "auscultation  Cardiovascular: No right ventricular heave, normal S1 and S2, no murmurs rubs or gallops  Abdomen: Soft nontender nondistended  Extremities: Warm without edema 2+ radial pulses bilaterally 2+ dorsalis pedis pulses bilaterally  Neurologic: Alert and oriented x3  Psychiatric: Normal mood and affect     Last Labs:  CMP:  Recent Labs     07/10/24  0912 04/11/24  0849 12/11/23  0947 10/13/23  1149    139  --  141   K 4.6 4.4  --  4.1    101  --  105   CO2 25 28  --  24   ANIONGAP 17 14  --  16   BUN 21 16  --  11   CREATININE 0.68 0.72 0.73 0.73   EGFR >90 88 87 87   GLUCOSE 123* 173*  --  136*     Recent Labs     07/10/24  0912 04/11/24  0849 08/08/23  0920   ALBUMIN 4.6 4.1 4.6   ALKPHOS 46 48 46   ALT 26 46* 27   AST 19 20 19   BILITOT 0.6 0.6 0.5     CBC:  Recent Labs     07/10/24  0912 04/11/24  0849 10/13/23  1149   WBC 6.6 8.0 7.7   HGB 13.6 14.6 13.4   HCT 43.3 45.9 43.2    228 228   MCV 88 88 88     COAG:   Recent Labs     05/31/24  1148 10/13/23  1149   INR 1.0 1.0     HEME/ENDO:  Recent Labs     07/10/24  0912 05/31/24  1148 04/11/24  0849 11/09/23  1204 08/08/23  0920 03/01/23  0904 09/19/22  1021   IRONSAT  --   --   --   --  19*  --  28   TSH 0.07*  --  0.19*  --   --  0.11* 0.17*   HGBA1C 7.5* 8.7* 8.1*   < > 8.8* 9.0* 8.6*    < > = values in this interval not displayed.      CARDIAC: No results for input(s): \"LDH\", \"CKMB\", \"TROPHS\", \"BNP\" in the last 42180 hours.    No lab exists for component: \"CK\", \"CKMBP\"  Recent Labs     07/10/24  0912 04/11/24  0849 03/01/23  0904 09/19/22  1021 04/13/22  1011   CHOL 165 230* 155 162 146   LDLF  --   --  73 83 65   LDLCALC 78 141*  --   --   --    HDL 55.8 50.5 61.2 52.9 55.0   TRIG 157* 195* 102 131 128       Last Cardiology Tests:  ECG:  Electrocardiogram performed today that I reviewed shows normal sinus rhythm right bundle branch block left axis deviation possible prior septal infarct pattern.    Echo:  Echocardiogram April 6, " 2023  CONCLUSIONS:  1. Left ventricular systolic function is normal with a 60-65% estimated ejection fraction.  2. Mild mitral regurgitation.       Cath:  Cardiac catheterization October 23, 2023  CONCLUSIONS:   1. Moderate ostial and mild mid LAD disease with an RFR of 0.89.   2. Patent proximal to mid LAD stent.   3. Mild circumflex and RCA disease.   4. Elevated left heart filling pressures.   5. Normal left ventricular systolic function.  In the distal portion of the stent, RFR was 0.92, in the proximal portion of stent, RFR was 0.95, in the left main, RFR was 0.98.     Cardiac catheterization April 17, 2023  Coronary Lesion Summary:  Vessel   Stenosis         Vessel Segment  LAD    90% stenosis            mid  LAD    60% stenosis      proximal to mid  LAD    70% stenosis distal third of the distal  CONCLUSIONS:  1. Successful IVUS-guided PCI and rotational atherectomy of LAD with 3.0 x 38 mm Resolute Sag[place]ore Davidson ZES post-dilated with 3.5 mm and 4.0 mm NC balloons.  2. Elevated left-sided filling pressure (LVEDP 18 mmHg) and no gradient on pull-back.  3. Angiographically mild CAD in the LCX and RCA suitable for medical therapy.        Stress Test:  Lexiscan nuclear stress test March 31, 2022  IMPRESSION:  1. Normal stress myocardial perfusion imaging in response to  pharmacologic stress.  2. Well-maintained left ventricular function.  80%      Cardiac Imaging:  Carotid ultrasound May 13, 2024  CONCLUSIONS:  Right Carotid: Findings are consistent with greater than 70% stenosis of the right proximal internal carotid artery. Turbulent flow seen by color Doppler. Right external carotid artery appears patent with no evidence of stenosis. The right vertebral artery is patent with antegrade flow. No evidence of hemodynamically significant stenosis in the right subclavian artery.  Left Carotid: Findings are consistent with 50 to 69% stenosis of the left proximal internal carotid artery. Left external carotid artery  appears patent with no evidence of stenosis. The left vertebral artery is patent with antegrade flow. No evidence of hemodynamically significant stenosis in the left subclavian artery.    MRA of the head and neck May 1, 2024  IMPRESSION:  MRI Brain:  1. There is no evidence of acute hemorrhage, mass lesion or acute  infarction..      MRA HEAD/NECK :  1. Near-complete loss of flow related signal consistent with severe  stenosis in the most proximal right ICA with reconstitution distally.  The remainder of the right ICA vessel remains diffusely diminutive in  diameter relative to the left.  2. High-grade stenosis of the proximal cervical left ICA with  expected flow signal distally.    CT abdomen and pelvis January 2022  VESSELS:  There is no aneurysmal dilatation of the abdominal aorta. There are  moderate atherosclerotic calcifications of the abdominal aorta and  its branches. Limited evaluation of the IVC on noncontrast imaging.    CT cardiac scoring June 2019  The imaged ascending, and descending thoracic aorta are normal in  course, caliber, and contour except for mild scattered  calcifications. The aortic arch is not  included in this study.     CORONARY ANATOMY:     Origins:Normal coronary artery origins.     Calcium Score (Agatston):  LM:   0  LAD:  476  LCx:  0  RCA: 113  ----------------------  Total: 589    Assessment/Plan   Diagnoses and all orders for this visit:  CAD in native artery  -     ECG 12 Lead  Essential hypertension  -     amLODIPine (Norvasc) 5 mg tablet; Take 1 tablet (5 mg) by mouth once daily.  Bilateral carotid artery stenosis  -     Referral to Vascular Surgery; Future  Type 2 diabetes mellitus without complication, without long-term current use of insulin (Multi)  Pure hypercholesterolemia  -     ezetimibe (Zetia) 10 mg tablet; Take 1 tablet (10 mg) by mouth once daily.  -     Lipid panel; Future    In summary Ms. Ching is a pleasant 73-year-old white female with a past medical  history significant for coronary artery disease with a CT calcium score of 589 in 2019 with subsequent PCI to the LAD in the setting of unstable angina with preserved LV function, bilateral carotid artery disease, type II non-insulin-requiring diabetes, hypertension, hyperlipidemia, and COPD.  She has residual moderate disease involving the ostium and mid to distal LAD.  Her RFR was borderline abnormal distally but nonischemic in the mid LAD.  She has had resolution of her prior angina which manifested as jaw discomfort and left arm discomfort.  She has relatively stable chronic dyspnea on exertion that likely is predominantly due to her underlying COPD.  She is euvolemic on exam.  Her blood pressure is not at goal.  I did increase her amlodipine to 5 mg daily.  Her most recent lipid profile was not at goal.  I added ezetimibe 10 mg daily.  I ordered labs as indicated below.  I referred her to vascular surgery as she wishes to see someone at [facility].  I did recommend that she consider a GLP-1 agonist for her diabetes.  She is hesitant to do so.  She is going to follow-up with her primary physician.  She should continue her other cardiovascular medications.  We will see her back in follow-up in 4 months.    I spent greater than 60 minutes interviewing and counseling the patient, reviewing prior data, and documenting.      Orders:  Orders Placed This Encounter   Procedures    Lipid panel    Referral to Vascular Surgery    ECG 12 Lead      Followup Appts:  Future Appointments   Date Time Provider Department Center   10/2/2024 11:00 AM Griffin Baltazar DO [id number]           ____________________________________________________________  Chris Torres MD  [facility] [place]  [facility]

## 2024-07-17 NOTE — PATIENT INSTRUCTIONS
Start ezetimibe 10 mg daily.  Increase amlodipine to 5 mg daily.  Check a fasting lipid profile in 6 to 8 weeks.  Vascular surgery referral.  Follow-up in 4 months.

## 2024-08-05 ENCOUNTER — OFFICE VISIT (OUTPATIENT)
Dept: VASCULAR SURGERY | Facility: HOSPITAL | Age: 74
End: 2024-08-05
Payer: MEDICARE

## 2024-08-05 VITALS
SYSTOLIC BLOOD PRESSURE: 132 MMHG | HEIGHT: 67 IN | WEIGHT: 157 LBS | BODY MASS INDEX: 24.64 KG/M2 | HEART RATE: 65 BPM | DIASTOLIC BLOOD PRESSURE: 74 MMHG

## 2024-08-05 DIAGNOSIS — I65.23 BILATERAL CAROTID ARTERY STENOSIS: ICD-10-CM

## 2024-08-05 PROCEDURE — 1159F MED LIST DOCD IN RCRD: CPT | Performed by: SURGERY

## 2024-08-05 PROCEDURE — 3051F HG A1C>EQUAL 7.0%<8.0%: CPT | Performed by: SURGERY

## 2024-08-05 PROCEDURE — 3048F LDL-C <100 MG/DL: CPT | Performed by: SURGERY

## 2024-08-05 PROCEDURE — 1157F ADVNC CARE PLAN IN RCRD: CPT | Performed by: SURGERY

## 2024-08-05 PROCEDURE — 1036F TOBACCO NON-USER: CPT | Performed by: SURGERY

## 2024-08-05 PROCEDURE — 3008F BODY MASS INDEX DOCD: CPT | Performed by: SURGERY

## 2024-08-05 PROCEDURE — 3078F DIAST BP <80 MM HG: CPT | Performed by: SURGERY

## 2024-08-05 PROCEDURE — 99205 OFFICE O/P NEW HI 60 MIN: CPT | Performed by: SURGERY

## 2024-08-05 PROCEDURE — 3074F SYST BP LT 130 MM HG: CPT | Performed by: SURGERY

## 2024-08-05 PROCEDURE — 99215 OFFICE O/P EST HI 40 MIN: CPT | Performed by: SURGERY

## 2024-08-05 ASSESSMENT — ENCOUNTER SYMPTOMS
UNEXPECTED WEIGHT CHANGE: 0
PALPITATIONS: 0
LOSS OF SENSATION IN FEET: 0
WEAKNESS: 0
SHORTNESS OF BREATH: 0
OCCASIONAL FEELINGS OF UNSTEADINESS: 0
NUMBNESS: 0
DEPRESSION: 0
ABDOMINAL PAIN: 0
WOUND: 0
NECK STIFFNESS: 0

## 2024-08-05 NOTE — PROGRESS NOTES
Vascular Surgery Consult/Clinic Note    CC: Carotid stenosis    HPI:  Mare Ching is 73 y.o. female with history of CT calcium score of 589 in 2019 with subsequent PCI to the LAD in the setting of unstable angina with preserved LV function, bilateral carotid artery disease, type II non-insulin-requiring diabetes, hypertension, hyperlipidemia, and COPD who was referred for Asx. B ICA stenosis.   She denies any stroke, TIA or unilateral neurologic sx.     March 2022 with jaw discomfort and left arm discomfort. She had a negative Lexiscan nuclear stress test. This progressively worsened over the course of the year. She subsequently had a cardiac catheterization April 2023 that showed a severe lesion in the mid LAD. She underwent PCI of her LAD.       Meds:   Current Outpatient Medications on File Prior to Visit   Medication Sig Dispense Refill    amLODIPine (Norvasc) 5 mg tablet Take 1 tablet (5 mg) by mouth once daily. 90 tablet 3    clopidogrel (Plavix) 75 mg tablet TAKE ONE TABLET BY MOUTH EVERY DAY 90 tablet 0    empagliflozin (Jardiance) 25 mg Take 1 tablet (25 mg) by mouth once daily. 90 tablet 1    ezetimibe (Zetia) 10 mg tablet Take 1 tablet (10 mg) by mouth once daily. 90 tablet 3    levothyroxine (Synthroid, Levoxyl) 100 mcg tablet Take 1 tablet (100 mcg) by mouth once daily in the morning. Take before meals. 90 tablet 1    metoprolol succinate XL (Toprol-XL) 25 mg 24 hr tablet TAKE ONE TABLET BY MOUTH ONCE DAILY. DO NOT CRUSH OR CHEW. 90 tablet 3    rosuvastatin (Crestor) 40 mg tablet TAKE ONE TABLET BY MOUTH EVERY DAY 90 tablet 3    Spiriva Respimat 2.5 mcg/actuation inhaler Inhale.      Symbicort 160-4.5 mcg/actuation inhaler Inhale.      triamcinolone (Kenalog) 0.1 % cream       Ventolin HFA 90 mcg/actuation inhaler       blood sugar diagnostic (Accu-Chek Guide test strips) strip USE TO TEST BLOOD SUGAR 3 TIMES DAILY 200 strip 0    lancets misc Use 3 times daily as directed      semaglutide 0.25  mg or 0.5 mg (2 mg/3 mL) pen injector Inject 0.25 mg under the skin 1 (one) time per week. (Patient not taking: Reported on 7/17/2024) 3 mL 2     No current facility-administered medications on file prior to visit.        Allergies:   Allergies   Allergen Reactions    Adhesive Tape-Silicones Other     blisters       SH:    Social Determinants of Health     Tobacco Use: Medium Risk (8/5/2024)    Patient History     Smoking Tobacco Use: Former     Smokeless Tobacco Use: Never     Passive Exposure: Not on file   Alcohol Use: Not At Risk (5/14/2024)    AUDIT-C     Frequency of Alcohol Consumption: Never     Average Number of Drinks: Patient does not drink     Frequency of Binge Drinking: Never   Financial Resource Strain: Not on file   Food Insecurity: Not on file   Transportation Needs: Not on file   Physical Activity: Not on file   Stress: Not on file   Social Connections: Not on file   Intimate Partner Violence: Not on file   Depression: Not at risk (7/2/2024)    PHQ-2     PHQ-2 Score: 0   Housing Stability: Not on file   Utilities: Not on file   Digital Equity: Not on file   Health Literacy: Not on file        FH:  Family History   Problem Relation Name Age of Onset    Cancer Mother      Diabetes Mother      Pancreatic cancer Mother      Stroke Father          recurrent    Cancer Father      Other (urinary cancer) Father      Other (cardiac disorder) Other          ROS:  Review of Systems   Constitutional:  Negative for unexpected weight change.   HENT:  Negative for congestion.    Eyes:  Negative for visual disturbance.   Respiratory:  Negative for shortness of breath.    Cardiovascular:  Negative for palpitations.   Gastrointestinal:  Negative for abdominal pain.   Musculoskeletal:  Negative for neck stiffness.   Skin:  Negative for wound.   Neurological:  Negative for weakness and numbness.        Objective:  Vitals:  Vitals:    08/05/24 0805   BP: 132/74   Pulse:         Exam:  Gen: in NAD  GI: Soft,  ND/NT  Ext:  BUE and BLE with 5/5 motor str.   CN II - XII grossly intact.     Imaging:  Carotid stenosis (5/13/2024) independently reviewed.   R ICA with >70% stenosis.   L ICA with 50-69% stenosis.   B vert with antegrade flow.     MRI head/neck (5/1/2024) independently reviewed.   MRA HEAD/NECK :  1. Near-complete loss of flow related signal consistent with severe  stenosis in the most proximal right ICA with reconstitution distally.  The remainder of the right ICA vessel remains diffusely diminutive in  diameter relative to the left.  2. High-grade stenosis of the proximal cervical left ICA with  expected flow signal distally.    Echocardiogram April 6, 2023  CONCLUSIONS:  1. Left ventricular systolic function is normal with a 60-65% estimated ejection fraction.  2. Mild mitral regurgitation.    Stress test:  Lexiscan nuclear stress test March 31, 2022  IMPRESSION:  1. Normal stress myocardial perfusion imaging in response to  pharmacologic stress.  2. Well-maintained left ventricular function.  80%      Assessment & Plan:  Mare Ching is 73 y.o. female with history of R>L Asx. ICA stenosis who has extensive cardiac comorbidity.    - Cont. Plavix and statin therapy.    - CTA neck ordered to assess carotid stenosis and surgical decision making between, CEA, VIVI vs BMT alone.    - Return to clinic with CTA in 3-4 weeks.     Chris Leonard MD, PhD  Clinical   [facility] [employer]  Co-Director, Aortic Center  [facility]

## 2024-08-12 DIAGNOSIS — E11.9 TYPE 2 DIABETES MELLITUS WITHOUT COMPLICATION, WITHOUT LONG-TERM CURRENT USE OF INSULIN (MULTI): ICD-10-CM

## 2024-08-12 RX ORDER — BLOOD SUGAR DIAGNOSTIC
STRIP MISCELLANEOUS
Qty: 100 STRIP | Refills: 2 | Status: SHIPPED | OUTPATIENT
Start: 2024-08-12

## 2024-08-14 ENCOUNTER — LAB (OUTPATIENT)
Dept: LAB | Facility: LAB | Age: 74
End: 2024-08-14
Payer: MEDICARE

## 2024-08-14 DIAGNOSIS — I65.23 BILATERAL CAROTID ARTERY STENOSIS: ICD-10-CM

## 2024-08-14 LAB
ANION GAP SERPL CALC-SCNC: 16 MMOL/L (ref 10–20)
BUN SERPL-MCNC: 18 MG/DL (ref 6–23)
CALCIUM SERPL-MCNC: 9.8 MG/DL (ref 8.6–10.6)
CHLORIDE SERPL-SCNC: 101 MMOL/L (ref 98–107)
CO2 SERPL-SCNC: 26 MMOL/L (ref 21–32)
CREAT SERPL-MCNC: 0.72 MG/DL (ref 0.5–1.05)
EGFRCR SERPLBLD CKD-EPI 2021: 88 ML/MIN/1.73M*2
GLUCOSE SERPL-MCNC: 125 MG/DL (ref 74–99)
POTASSIUM SERPL-SCNC: 4.5 MMOL/L (ref 3.5–5.3)
SODIUM SERPL-SCNC: 138 MMOL/L (ref 136–145)

## 2024-08-14 PROCEDURE — 36415 COLL VENOUS BLD VENIPUNCTURE: CPT

## 2024-08-14 PROCEDURE — 80048 BASIC METABOLIC PNL TOTAL CA: CPT

## 2024-08-19 ENCOUNTER — HOSPITAL ENCOUNTER (OUTPATIENT)
Dept: RADIOLOGY | Facility: CLINIC | Age: 74
Discharge: HOME | End: 2024-08-19
Payer: MEDICARE

## 2024-08-19 DIAGNOSIS — I65.23 BILATERAL CAROTID ARTERY STENOSIS: ICD-10-CM

## 2024-08-19 PROCEDURE — 70498 CT ANGIOGRAPHY NECK: CPT | Performed by: RADIOLOGY

## 2024-08-19 PROCEDURE — 2550000001 HC RX 255 CONTRASTS: Performed by: SURGERY

## 2024-08-19 PROCEDURE — 70498 CT ANGIOGRAPHY NECK: CPT

## 2024-08-22 ENCOUNTER — LAB (OUTPATIENT)
Dept: LAB | Facility: LAB | Age: 74
End: 2024-08-22
Payer: MEDICARE

## 2024-08-22 DIAGNOSIS — E78.00 PURE HYPERCHOLESTEROLEMIA: ICD-10-CM

## 2024-08-22 LAB
CHOLEST SERPL-MCNC: 142 MG/DL (ref 0–199)
CHOLESTEROL/HDL RATIO: 2.6
HDLC SERPL-MCNC: 54.7 MG/DL
LDLC SERPL CALC-MCNC: 68 MG/DL
NON HDL CHOLESTEROL: 87 MG/DL (ref 0–149)
TRIGL SERPL-MCNC: 99 MG/DL (ref 0–149)
VLDL: 20 MG/DL (ref 0–40)

## 2024-08-22 PROCEDURE — 36415 COLL VENOUS BLD VENIPUNCTURE: CPT

## 2024-08-22 PROCEDURE — 80061 LIPID PANEL: CPT

## 2024-08-26 ENCOUNTER — DOCUMENTATION (OUTPATIENT)
Dept: CARDIOLOGY | Facility: HOSPITAL | Age: 74
End: 2024-08-26

## 2024-08-26 ENCOUNTER — OFFICE VISIT (OUTPATIENT)
Dept: VASCULAR SURGERY | Facility: HOSPITAL | Age: 74
End: 2024-08-26
Payer: MEDICARE

## 2024-08-26 VITALS
OXYGEN SATURATION: 94 % | WEIGHT: 155.8 LBS | SYSTOLIC BLOOD PRESSURE: 149 MMHG | HEART RATE: 67 BPM | DIASTOLIC BLOOD PRESSURE: 61 MMHG | HEIGHT: 67 IN | BODY MASS INDEX: 24.45 KG/M2

## 2024-08-26 DIAGNOSIS — I65.21 CAROTID STENOSIS, ASYMPTOMATIC, RIGHT: Primary | ICD-10-CM

## 2024-08-26 PROCEDURE — 3048F LDL-C <100 MG/DL: CPT | Performed by: SURGERY

## 2024-08-26 PROCEDURE — 3051F HG A1C>EQUAL 7.0%<8.0%: CPT | Performed by: SURGERY

## 2024-08-26 PROCEDURE — 1036F TOBACCO NON-USER: CPT | Performed by: SURGERY

## 2024-08-26 PROCEDURE — 1157F ADVNC CARE PLAN IN RCRD: CPT | Performed by: SURGERY

## 2024-08-26 PROCEDURE — 99214 OFFICE O/P EST MOD 30 MIN: CPT | Performed by: SURGERY

## 2024-08-26 PROCEDURE — 3078F DIAST BP <80 MM HG: CPT | Performed by: SURGERY

## 2024-08-26 PROCEDURE — 3008F BODY MASS INDEX DOCD: CPT | Performed by: SURGERY

## 2024-08-26 PROCEDURE — 99214 OFFICE O/P EST MOD 30 MIN: CPT | Mod: 57 | Performed by: SURGERY

## 2024-08-26 PROCEDURE — 3077F SYST BP >= 140 MM HG: CPT | Performed by: SURGERY

## 2024-08-26 RX ORDER — SODIUM CHLORIDE, SODIUM LACTATE, POTASSIUM CHLORIDE, CALCIUM CHLORIDE 600; 310; 30; 20 MG/100ML; MG/100ML; MG/100ML; MG/100ML
20 INJECTION, SOLUTION INTRAVENOUS CONTINUOUS
OUTPATIENT
Start: 2024-08-26

## 2024-08-26 ASSESSMENT — ENCOUNTER SYMPTOMS
OCCASIONAL FEELINGS OF UNSTEADINESS: 0
LOSS OF SENSATION IN FEET: 0
DEPRESSION: 0

## 2024-08-26 NOTE — PROGRESS NOTES
Pt states muscle cramping from achilles to up back of bilateral calves since starting Zetia.  Dr. Torres and Dana Monzon, [facility] made aware.  Per Dana, pt to STOP Zetia and call clinic in one week to notify office of any change in condition.  Pt made aware and verbalizes understanding.

## 2024-08-26 NOTE — PROGRESS NOTES
Vascular Surgery Consult/Clinic Note    CC: Carotid stenosis     HPI:  Mare Ching is 73 y.o. female with history of CT calcium score of 589 in 2019 with subsequent PCI to the LAD in the setting of unstable angina with preserved LV function, bilateral carotid artery disease, type II non-insulin-requiring diabetes, hypertension, hyperlipidemia, and COPD who was referred for Asx. B ICA stenosis.   She denies any stroke, TIA or unilateral neurologic sx.      March 2022 with jaw discomfort and left arm discomfort. She had a negative Lexiscan nuclear stress test. This progressively worsened over the course of the year. She subsequently had a cardiac catheterization April 2023 that showed a severe lesion in the mid LAD. She underwent PCI of her LAD.     She returns today after obtaining CTA.     Meds:   Current Outpatient Medications on File Prior to Visit   Medication Sig Dispense Refill    amLODIPine (Norvasc) 5 mg tablet Take 1 tablet (5 mg) by mouth once daily. 90 tablet 3    clopidogrel (Plavix) 75 mg tablet TAKE ONE TABLET BY MOUTH EVERY DAY 90 tablet 0    empagliflozin (Jardiance) 25 mg Take 1 tablet (25 mg) by mouth once daily. 90 tablet 1    ezetimibe (Zetia) 10 mg tablet Take 1 tablet (10 mg) by mouth once daily. 90 tablet 3    levothyroxine (Synthroid, Levoxyl) 100 mcg tablet Take 1 tablet (100 mcg) by mouth once daily in the morning. Take before meals. 90 tablet 1    metoprolol succinate XL (Toprol-XL) 25 mg 24 hr tablet TAKE ONE TABLET BY MOUTH ONCE DAILY. DO NOT CRUSH OR CHEW. 90 tablet 3    rosuvastatin (Crestor) 40 mg tablet TAKE ONE TABLET BY MOUTH EVERY DAY 90 tablet 3    Spiriva Respimat 2.5 mcg/actuation inhaler Inhale.      Symbicort 160-4.5 mcg/actuation inhaler Inhale.      triamcinolone (Kenalog) 0.1 % cream       Ventolin HFA 90 mcg/actuation inhaler       blood sugar diagnostic (Accu-Chek Guide test strips) strip USE TO TEST BLOOD SUGAR 3 TIMES DAILY. 100 strip 2    lancets misc Use 3  times daily as directed      semaglutide 0.25 mg or 0.5 mg (2 mg/3 mL) pen injector Inject 0.25 mg under the skin 1 (one) time per week. (Patient not taking: Reported on 7/17/2024) 3 mL 2     No current facility-administered medications on file prior to visit.        Allergies:   Allergies   Allergen Reactions    Adhesive Tape-Silicones Other     blisters       SH:    Social Determinants of Health     Tobacco Use: Medium Risk (8/26/2024)    Patient History     Smoking Tobacco Use: Former     Smokeless Tobacco Use: Never     Passive Exposure: Not on file   Alcohol Use: Not At Risk (5/14/2024)    AUDIT-C     Frequency of Alcohol Consumption: Never     Average Number of Drinks: Patient does not drink     Frequency of Binge Drinking: Never   Financial Resource Strain: Not on file   Food Insecurity: Not on file   Transportation Needs: Not on file   Physical Activity: Not on file   Stress: Not on file   Social Connections: Not on file   Intimate Partner Violence: Not on file   Depression: Not at risk (7/2/2024)    PHQ-2     PHQ-2 Score: 0   Housing Stability: Not on file   Utilities: Not on file   Digital Equity: Not on file   Health Literacy: Not on file        FH:  Family History   Problem Relation Name Age of Onset    Cancer Mother      Diabetes Mother      Pancreatic cancer Mother      Stroke Father          recurrent    Cancer Father      Other (urinary cancer) Father      Other (cardiac disorder) Other            Objective:  Vitals:  Vitals:    08/26/24 0846   BP: 149/61   Pulse:    SpO2:       Exam:  Gen: in NAD  GI: Soft, ND/NT  Ext:  BUE and BLE with 5/5 motor str.   Face asymmetric (chronic since the left parotid surgery) with chronic left facial paraesthesia.   CN II - XII grossly intact.      Imaging:  CTA neck (8/19/2024) independently reviewed.   R ICA with >70% stenosis.   L ICA with ~60% stenosis.     Carotid stenosis (5/13/2024) independently reviewed.   R ICA with >70% stenosis.   L ICA with 50-69%  stenosis.   B vert with antegrade flow.      MRI head/neck (5/1/2024) independently reviewed.   MRA HEAD/NECK :  1. Near-complete loss of flow related signal consistent with severe  stenosis in the most proximal right ICA with reconstitution distally.  The remainder of the right ICA vessel remains diffusely diminutive in  diameter relative to the left.  2. High-grade stenosis of the proximal cervical left ICA with  expected flow signal distally.     Echocardiogram April 6, 2023  CONCLUSIONS:  1. Left ventricular systolic function is normal with a 60-65% estimated ejection fraction.  2. Mild mitral regurgitation.     Stress test:  Lexiscan nuclear stress test March 31, 2022  IMPRESSION:  1. Normal stress myocardial perfusion imaging in response to  pharmacologic stress.  2. Well-maintained left ventricular function.  80%    Assessment & Plan:  Mare Ching is 73 y.o. female with history of R>L Asx. ICA stenosis.    - R ICA with severe stenosis of the R ICA (nearly occlusive).    - Discussed the image findings and discussed carotid revascularization vs best medical therapy alone. We also discussed natural history of carotid stenosis.   After discussion, patient wished to proceed with right carotid endarterectomy.   I discussed the plan for right carotid endarterectomy (CEA). We reviewed the options for care, including medical management alone or carotid artery stenting. CEA was recommended to reduce the risk of future stroke, but it was explained that there is no expected change in current symptoms or condition. The typical recovery was discussed. The potential risks were reviewed, including stroke, myocardial infarction, death, cranial injury, bleeding, infection, recurrent stenosis, hyper- or hypotension, dysrhythmia, or other serious complication. The patient indicated understanding of the procedure, plan, alternatives, and risks, and voiced consent to proceed.       Chris Leonard MD, PhD  Clinical Associate  Professor  [facility] [employer]  Co-Director, Aortic Center  [facility] [place]

## 2024-08-28 ENCOUNTER — PRE-ADMISSION TESTING (OUTPATIENT)
Dept: PREADMISSION TESTING | Facility: HOSPITAL | Age: 74
End: 2024-08-28
Payer: MEDICARE

## 2024-08-28 VITALS
WEIGHT: 155.1 LBS | DIASTOLIC BLOOD PRESSURE: 68 MMHG | SYSTOLIC BLOOD PRESSURE: 129 MMHG | TEMPERATURE: 98.4 F | HEART RATE: 69 BPM | BODY MASS INDEX: 24.34 KG/M2 | HEIGHT: 67 IN | OXYGEN SATURATION: 95 %

## 2024-08-28 DIAGNOSIS — I65.21 CAROTID STENOSIS, ASYMPTOMATIC, RIGHT: ICD-10-CM

## 2024-08-28 DIAGNOSIS — Z01.818 PREOPERATIVE EXAMINATION: Primary | ICD-10-CM

## 2024-08-28 LAB
ABO GROUP (TYPE) IN BLOOD: NORMAL
ANION GAP SERPL CALC-SCNC: 19 MMOL/L (ref 10–20)
ANTIBODY SCREEN: NORMAL
APTT PPP: 27 SECONDS (ref 27–38)
BUN SERPL-MCNC: 12 MG/DL (ref 6–23)
CALCIUM SERPL-MCNC: 10 MG/DL (ref 8.6–10.6)
CHLORIDE SERPL-SCNC: 101 MMOL/L (ref 98–107)
CO2 SERPL-SCNC: 24 MMOL/L (ref 21–32)
CREAT SERPL-MCNC: 0.57 MG/DL (ref 0.5–1.05)
EGFRCR SERPLBLD CKD-EPI 2021: >90 ML/MIN/1.73M*2
ERYTHROCYTE [DISTWIDTH] IN BLOOD BY AUTOMATED COUNT: 14.7 % (ref 11.5–14.5)
GLUCOSE SERPL-MCNC: 114 MG/DL (ref 74–99)
HCT VFR BLD AUTO: 43.7 % (ref 36–46)
HGB BLD-MCNC: 14.3 G/DL (ref 12–16)
INR PPP: 1 (ref 0.9–1.1)
MCH RBC QN AUTO: 28.5 PG (ref 26–34)
MCHC RBC AUTO-ENTMCNC: 32.7 G/DL (ref 32–36)
MCV RBC AUTO: 87 FL (ref 80–100)
NRBC BLD-RTO: 0 /100 WBCS (ref 0–0)
PLATELET # BLD AUTO: 222 X10*3/UL (ref 150–450)
POTASSIUM SERPL-SCNC: 4.7 MMOL/L (ref 3.5–5.3)
PROTHROMBIN TIME: 11.3 SECONDS (ref 9.8–12.8)
RBC # BLD AUTO: 5.02 X10*6/UL (ref 4–5.2)
RH FACTOR (ANTIGEN D): NORMAL
SODIUM SERPL-SCNC: 139 MMOL/L (ref 136–145)
WBC # BLD AUTO: 6.9 X10*3/UL (ref 4.4–11.3)

## 2024-08-28 PROCEDURE — 85027 COMPLETE CBC AUTOMATED: CPT

## 2024-08-28 PROCEDURE — 86901 BLOOD TYPING SEROLOGIC RH(D): CPT

## 2024-08-28 PROCEDURE — 80048 BASIC METABOLIC PNL TOTAL CA: CPT

## 2024-08-28 PROCEDURE — 99204 OFFICE O/P NEW MOD 45 MIN: CPT | Performed by: NURSE PRACTITIONER

## 2024-08-28 PROCEDURE — 36415 COLL VENOUS BLD VENIPUNCTURE: CPT

## 2024-08-28 PROCEDURE — 85610 PROTHROMBIN TIME: CPT

## 2024-08-28 PROCEDURE — 87081 CULTURE SCREEN ONLY: CPT

## 2024-08-28 RX ORDER — CHLORHEXIDINE GLUCONATE ORAL RINSE 1.2 MG/ML
SOLUTION DENTAL
Qty: 473 ML | Refills: 0 | Status: SHIPPED | OUTPATIENT
Start: 2024-08-28 | End: 2024-09-06 | Stop reason: HOSPADM

## 2024-08-28 RX ORDER — CHLORHEXIDINE GLUCONATE 40 MG/ML
SOLUTION TOPICAL 2 TIMES DAILY
Qty: 473 ML | Refills: 0 | Status: SHIPPED | OUTPATIENT
Start: 2024-08-28 | End: 2024-09-06 | Stop reason: HOSPADM

## 2024-08-28 ASSESSMENT — ENCOUNTER SYMPTOMS
MUSCULOSKELETAL NEGATIVE: 1
EYES NEGATIVE: 1
ENDOCRINE NEGATIVE: 1
NEUROLOGICAL NEGATIVE: 1
CARDIOVASCULAR NEGATIVE: 1
GASTROINTESTINAL NEGATIVE: 1
RESPIRATORY NEGATIVE: 1
CONSTITUTIONAL NEGATIVE: 1
NECK NEGATIVE: 1

## 2024-08-28 ASSESSMENT — LIFESTYLE VARIABLES: SMOKING_STATUS: NONSMOKER

## 2024-08-28 ASSESSMENT — DUKE ACTIVITY SCORE INDEX (DASI)
CAN YOU PARTICIPATE IN MODERATE RECREATIONAL ACTIVITIES LIKE GOLF, BOWLING, DANCING, DOUBLES TENNIS OR THROWING A BASEBALL OR FOOTBALL: YES
CAN YOU WALK INDOORS, SUCH AS AROUND YOUR HOUSE: YES
CAN YOU DO YARD WORK LIKE RAKING LEAVES, WEEDING OR PUSHING A MOWER: NO
CAN YOU TAKE CARE OF YOURSELF (EAT, DRESS, BATHE, OR USE TOILET): YES
CAN YOU CLIMB A FLIGHT OF STAIRS OR WALK UP A HILL: YES
CAN YOU WALK A BLOCK OR TWO ON LEVEL GROUND: YES
TOTAL_SCORE: 32.95
DASI METS SCORE: 6.8
CAN YOU HAVE SEXUAL RELATIONS: NO
CAN YOU PARTICIPATE IN STRENOUS SPORTS LIKE SWIMMING, SINGLES TENNIS, FOOTBALL, BASKETBALL, OR SKIING: NO
CAN YOU DO HEAVY WORK AROUND THE HOUSE LIKE SCRUBBING FLOORS OR LIFTING AND MOVING HEAVY FURNITURE: YES
CAN YOU RUN A SHORT DISTANCE: NO
CAN YOU DO MODERATE WORK AROUND THE HOUSE LIKE VACUUMING, SWEEPING FLOORS OR CARRYING GROCERIES: YES
CAN YOU DO LIGHT WORK AROUND THE HOUSE LIKE DUSTING OR WASHING DISHES: YES

## 2024-08-28 NOTE — H&P (VIEW-ONLY)
CPM/PAT Evaluation       Name: Mare Ching (Mare Ching)  /Age: 1950/73 y.o.     Visit Type:   In-Person       Chief Complaint: carotid stenosis scheduled for surgery    HPI: Patient is 73-year-old female scheduled for right carotid endarterectomy on 2024 for treatment of carotid artery stenosis.  The patient is referred by Dr. Chris Leonard for preoperative evaluation of COPD with no recent exacerbations, CAD status post PCI with stent, delayed emergence from general anesthesia, hypertension, hyperlipidemia, hypothyroidism, asthma, postop nausea and vomiting, NIDDM.    Past Medical History:   Diagnosis Date    Abdominal bloating 2023    Acute bacterial bronchitis 2023    Acute UTI 2023    Angina pectoris ([facility])     resolved per cardiology last note    Body mass index (BMI) 25.0-25.9, adult     BMI 25.0-25.9,adult    Body mass index (BMI) 26.0-26.9, adult     BMI 26.0-26.9,adult    Candidal vaginitis 2023    Chest pain, atypical 2023    Chest pain, midsternal 2023    Chronic obstructive pulmonary disease, unspecified (Multi) 07/15/2021    COPD (chronic obstructive pulmonary disease)    Coronary artery disease     Delayed emergence from general anesthesia     Dysuria 2023    Flank pain 2023    Hip pain, right 2023    History of colonoscopy 2024    History of substance dependence (Multi) 2024    Hyperlipidemia, unspecified 10/03/2022    Hyperlipidemia    Hypertension     Hypothyroidism     Hypothyroidism, unspecified 2022    Hypothyroidism    Increased urinary frequency 2023    Other conditions influencing health status 2015    Mammogram normal    Other specified health status     No pertinent past surgical history    Other specified health status 2015    No pertinent past medical history    Other specified postprocedural states 2015    H/O colonoscopy    Personal history of other  diseases of the respiratory system     History of asthma    PONV (postoperative nausea and vomiting)     Proteinuria 2023    Recurrent urinary tract infection 2023    Sialoadenitis 2023    SOB (shortness of breath) 2023    Type 2 diabetes mellitus without complications (Multi) 10/11/2022    Diabetes mellitus    Urinary urgency 2023       Past Surgical History:   Procedure Laterality Date    CARDIAC CATHETERIZATION N/A 10/23/2023    Procedure: Left Heart Cath, With LV (11145);  Surgeon: Manpreet Garg MD;  Location: [facility] Cardiac Cath Lab;  Service: Cardiovascular;  Laterality: N/A;    HYSTERECTOMY  2015    Hysterectomy    OTHER SURGICAL HISTORY  2022    Tubal ligation    OTHER SURGICAL HISTORY  2020     section    OTHER SURGICAL HISTORY  2015    Surgery Excision Of Parotid Tumor/Gland       Patient Sexual activity questions deferred to the physician.    Family History   Problem Relation Name Age of Onset    Cancer Mother      Diabetes Mother      Pancreatic cancer Mother      Stroke Father          recurrent    Cancer Father      Other (urinary cancer) Father      Other (cardiac disorder) Other         Allergies   Allergen Reactions    Adhesive Tape-Silicones Other     blisters       Prior to Admission medications    Medication Sig Start Date End Date Taking? Authorizing Provider   amLODIPine (Norvasc) 5 mg tablet Take 1 tablet (5 mg) by mouth once daily. 24 Yes Chris Torres MD   blood sugar diagnostic (Accu-Chek Guide test strips) strip USE TO TEST BLOOD SUGAR 3 TIMES DAILY. 24  Yes Griffin Baltazar, DO   clopidogrel (Plavix) 75 mg tablet TAKE ONE TABLET BY MOUTH EVERY DAY 24  Yes Manpreet Garg MD   empagliflozin (Jardiance) 25 mg Take 1 tablet (25 mg) by mouth once daily. 24 Yes Griffin Baltazar DO   ezetimibe (Zetia) 10 mg tablet Take 1 tablet (10 mg) by mouth once daily. 24 Yes Chris Torres MD    lancets misc Use 3 times daily as directed   Yes Historical Provider, MD   levothyroxine (Synthroid, Levoxyl) 100 mcg tablet Take 1 tablet (100 mcg) by mouth once daily in the morning. Take before meals. 5/2/24  Yes Griffin Baltazar, DO   metoprolol succinate XL (Toprol-XL) 25 mg 24 hr tablet TAKE ONE TABLET BY MOUTH ONCE DAILY. DO NOT CRUSH OR CHEW. 7/10/24 7/10/25 Yes Irvin Fan MD   rosuvastatin (Crestor) 40 mg tablet TAKE ONE TABLET BY MOUTH EVERY DAY 5/6/24  Yes Irvin Fan MD   Spiriva Respimat 2.5 mcg/actuation inhaler Inhale. 3/22/16  Yes Historical Provider, MD   Symbicort 160-4.5 mcg/actuation inhaler Inhale. 3/22/16  Yes Historical Provider, MD   Ventolin HFA 90 mcg/actuation inhaler  4/13/23  Yes Historical Provider, MD   chlorhexidine (Hibiclens) 4 % external liquid Apply topically 2 times a day for 5 days. 8/28/24 9/2/24  Samantha A Meeson, APRN-CNP   chlorhexidine (Peridex) 0.12 % solution Swish and spit 15 mL night before surgery and morning of surgery 8/28/24   Samantha A Meeson, APRN-CNP   semaglutide 0.25 mg or 0.5 mg (2 mg/3 mL) pen injector Inject 0.25 mg under the skin 1 (one) time per week.  Patient not taking: Reported on 7/17/2024 6/2/24   Griffin Baltazar DO   triamcinolone (Kenalog) 0.1 % cream  7/15/22   Historical Provider, MD MANCUSO ROS:   Constitutional:   neg    Neuro/Psych:   neg    Eyes:   neg     use of corrective lenses (reading and driving)  Ears:   neg    Nose:   neg    Mouth:   neg    Throat:   neg    Neck:   neg    Cardio:   neg    Respiratory:   neg    Endocrine:   neg    GI:   neg    :   neg    Musculoskeletal:   neg    Hematologic:   neg    Skin:  neg        Physical Exam  Vitals reviewed.   Constitutional:       Appearance: Normal appearance.   HENT:      Head: Normocephalic.      Mouth/Throat:      Mouth: Mucous membranes are moist.   Eyes:      Conjunctiva/sclera: Conjunctivae normal.   Neck:      Vascular: No carotid bruit.   Cardiovascular:      Rate and  Rhythm: Normal rate and regular rhythm.      Pulses: Normal pulses.      Heart sounds: Normal heart sounds.   Pulmonary:      Effort: Pulmonary effort is normal.      Breath sounds: Normal breath sounds.   Abdominal:      Palpations: Abdomen is soft.      Tenderness: There is no abdominal tenderness.   Musculoskeletal:         General: Normal range of motion.      Cervical back: Normal range of motion.      Right lower leg: No edema.      Left lower leg: No edema.   Lymphadenopathy:      Cervical: No cervical adenopathy.   Skin:     General: Skin is warm and dry.      Capillary Refill: Capillary refill takes less than 2 seconds.   Neurological:      General: No focal deficit present.      Mental Status: She is alert and oriented to person, place, and time.   Psychiatric:         Mood and Affect: Mood normal.         Behavior: Behavior normal.         Thought Content: Thought content normal.         Judgment: Judgment normal.          PAT AIRWAY:   Airway:     Mallampati::  III    Neck ROM::  Full  normal        Visit Vitals  /68   Pulse 69   Temp 36.9 °C (98.4 °F)       DASI Risk Score      Flowsheet Row Most Recent Value   DASI SCORE 32.95   METS Score (Will be calculated only when all the questions are answered) 6.8          Caprini DVT Assessment      Flowsheet Row Most Recent Value   DVT Score 7   Current Status Major surgery planned, including arthroscopic and laproscopic (1-2 hours), COPD   History COPD   Age 60-75 years   BMI 30 or less          Modified Frailty Index      Flowsheet Row Most Recent Value   Modified Frailty Index Calculator .3636          CHADS2 Stroke Risk  Current as of 2 hours ago        N/A 3 to 100%: High Risk   2 to < 3%: Medium Risk   0 to < 2%: Low Risk     Last Change: N/A          This score determines the patient's risk of having a stroke if the patient has atrial fibrillation.        This score is not applicable to this patient. Components are not calculated.          Revised  Cardiac Risk Index      Flowsheet Row Most Recent Value   Revised Cardiac Risk Calculator 2          Apfel Simplified Score      Flowsheet Row Most Recent Value   Apfel Simplified Score Calculator 3          Risk Analysis Index Results This Encounter    No data found in the last 1 encounters.       Stop Bang Score      Flowsheet Row Most Recent Value   Do you snore loudly? 0   Do you often feel tired or fatigued after your sleep? 0   Has anyone ever observed you stop breathing in your sleep? 0   Do you have or are you being treated for high blood pressure? 1   Recent BMI (Calculated) 24.4   Is BMI greater than 35 kg/m2? 0=No   Age older than 50 years old? 1=Yes   Is your neck circumference greater than 17 inches (Male) or 16 inches (Female)? 1   Gender - Male 0=No   STOP-BANG Total Score 3          COPD with no recent exacerbations, asthma, postop nausea and vomiting,     Assessment and Plan:   Neuro:  No neurologic diagnoses, however, the patient is at an increased risk for post operative delirium secondary to age >/= 65 and type and duration of surgery.  Preoperative brain exercise educational handout provided to patient.    The patient is at an increased risk for perioperative stroke secondary to cardiac disease, increased age, HTN, HLD, DM , female sex , vascular surgery , and general anesthesia.    HEENT/Airway:  No diagnosis or significant findings on chart review or clinical presentation and evaluation.    Cardiovascular: Asymptomatic right carotid artery stenosis scheduled for surgery.  Carotid US 05/13/24  CONCLUSIONS:  Right Carotid: Findings are consistent with greater than 70% stenosis of the right proximal internal carotid artery. Turbulent flow seen by color Doppler. Right external carotid artery appears patent with no evidence of stenosis. The right vertebral artery is patent with antegrade flow. No evidence of hemodynamically significant stenosis in the right subclavian artery.  Left Carotid:  Findings are consistent with 50 to 69% stenosis of the left proximal internal carotid artery. Left external carotid artery appears patent with no evidence of stenosis. The left vertebral artery is patent with antegrade flow. No evidence of hemodynamically significant stenosis in the left subclavian artery.    CAD -managed on metoprolol (continue), clopidogrel (continue) and rosuvastatin (continue) per SCIP protocol. Patient with CT calcium score 589 in 2019 with subsequent PCI to the LAD in the setting of unstable angina with preserved LV function.  She presented in March 2022 with jaw discomfort and left arm discomfort. She had a negative Lexiscan nuclear stress test. This progressively worsened over the course of the year. She subsequently had a cardiac catheterization April 2023 that showed a severe lesion in the mid LAD. She underwent PCI of her LAD. She had resolution of her jaw discomfort and left arm discomfort after PCI. She then noted recurrent jaw discomfort and left arm discomfort in October 2023. Repeat heart catheterization revealed a widely patent LAD stent. She had residual moderate disease in the ostium of the LAD and mid to distal LAD. RFR was borderline abnormal distally at 0.89. RFR in the mid vessel just distal to the stent was 0.92. She was started on metoprolol and her jaw discomfort and arm discomfort resolved.     Hypertension managed on amlodipine (continue).  Hyperlipidemia managed on ezetimibe (hold 24 hours) and rosuvastatin (continue).    EKG 07/17/2024 normal sinus rhythm with right bundle desirae block and no acute changes when compared to prior.  No additional preoperative testing is currently indicated.    Follows with cardiology, Dr. Chris Torres, with last visit 07/17/2024 at which time patient is stable and to follow-up in 4 months.  Cardiac clearance scanned into media tab 06/03/2024.    METS are 6.8    RCRI  2 which is 10.1% 30 day risk of MACE (risk for cardiac death, nonfatal  myocardial infarction, and nonfactal cardiac arrest    FREDERICK score which indicates a 0.8% risk of intraoperative or 30-day postoperative MACE      Pulmonary: Asthma and COPD that are well-controlled with no recent exacerbations.  Patient managed on Spiriva (continue), Symbicort (continue) and rescue Ventolin inhaler (continue).  Patient uses rescue inhaler on a weekly basis.  Preoperative deep breathing educational handout provided to patient.    ARISCAT:   11   points which is a low (1.6%) risk of in-hospital post-op pulmonary complications     PRODIGY:  12  points which is a intermediate risk of post op opioid induced respiratory depression episodes    STOP BANG:   3  points which is a intermediate risk for moderate to severe DARRION. Patient to discuss risk factors with pcp post op.     Renal: No diagnosis or significant findings on chart review or clinical presentation and evaluation, however, the patient is at increased risk of perioperative renal complications secondary to age>/= 56, HTN, and diabetes. Preventative measures include preoperative BP control and hydration.     Endocrine:  NIDDM managed on empagliflozin (hold 3 days).  Patient prescribed semaglutide but does not take this which she plans to discuss with her PCP.  Patient knows not to start this medication prior to surgery.  A1c on 07/10/2024 7.5%.    Hypothyroidism managed on levothyroxine (continue).  TSH on 07/10/2024 low at 0.07 however free T4 within normal limits at 1.44 therefore patient subclinical and is currently asymptomatic.  Will continue to follow with PCP.    Patient given Medrol Dosepak in March 2024 for URI consider stress dosing.    Hematologic:   No diagnosis or significant findings on chart review or clinical presentation and evaluation however see below risk screening tool.  Preoperative DVT educational handout provided to patient.    Caprini Score:  7  points which is a highest risk of perioperative VTE    Gastrointestinal: No  diagnosis or significant findings on chart review or clinical presentation and evaluation.    EAT-10 score of   0 - self-perceived oropharyngeal dysphagia scale (0-40)     Apfel: 3 points 61% risk for post operative N/V    Infectious disease:  No diagnosis or significant findings on chart review or clinical presentation and evaluation.     Musculoskeletal: No diagnosis or significant findings on chart review or clinical presentation and evaluation.      Other:   Possible delayed emergence 25 years ago following left parotidectomy        Labs ordered  Results for orders placed or performed in visit on 08/28/24 (from the past 96 hour(s))   Type And Screen   Result Value Ref Range    ABO TYPE A     Rh TYPE NEG     ANTIBODY SCREEN NEG    Coagulation Screen   Result Value Ref Range    Protime 11.3 9.8 - 12.8 seconds    INR 1.0 0.9 - 1.1    aPTT 27 27 - 38 seconds   CBC   Result Value Ref Range    WBC 6.9 4.4 - 11.3 x10*3/uL    nRBC 0.0 0.0 - 0.0 /100 WBCs    RBC 5.02 4.00 - 5.20 x10*6/uL    Hemoglobin 14.3 12.0 - 16.0 g/dL    Hematocrit 43.7 36.0 - 46.0 %    MCV 87 80 - 100 fL    MCH 28.5 26.0 - 34.0 pg    MCHC 32.7 32.0 - 36.0 g/dL    RDW 14.7 (H) 11.5 - 14.5 %    Platelets 222 150 - 450 x10*3/uL   Basic Metabolic Panel   Result Value Ref Range    Glucose 114 (H) 74 - 99 mg/dL    Sodium 139 136 - 145 mmol/L    Potassium 4.7 3.5 - 5.3 mmol/L    Chloride 101 98 - 107 mmol/L    Bicarbonate 24 21 - 32 mmol/L    Anion Gap 19 10 - 20 mmol/L    Urea Nitrogen 12 6 - 23 mg/dL    Creatinine 0.57 0.50 - 1.05 mg/dL    eGFR >90 >60 mL/min/1.73m*2    Calcium 10.0 8.6 - 10.6 mg/dL   Staphylococcus aureus/MRSA colonization, Culture    Specimen: Nares/Axilla/Groin; Swab   Result Value Ref Range    Staph/MRSA Screen Culture No Staphylococcus aureus isolated         Lab Results   Component Value Date    HGBA1C 7.5 (H) 07/10/2024

## 2024-08-28 NOTE — CPM/PAT H&P
CPM/PAT Evaluation       Name: Mare Ching (Mare Ching)  /Age: 1950/73 y.o.     Visit Type:   In-Person       Chief Complaint: carotid stenosis scheduled for surgery    HPI: Patient is 73-year-old female scheduled for right carotid endarterectomy on 2024 for treatment of carotid artery stenosis.  The patient is referred by Dr. Chris Leonard for preoperative evaluation of COPD with no recent exacerbations, CAD status post PCI with stent, delayed emergence from general anesthesia, hypertension, hyperlipidemia, hypothyroidism, asthma, postop nausea and vomiting, NIDDM.    Past Medical History:   Diagnosis Date    Abdominal bloating 2023    Acute bacterial bronchitis 2023    Acute UTI 2023    Angina pectoris (CMS-[facility])     resolved per cardiology last note    Body mass index (BMI) 25.0-25.9, adult     BMI 25.0-25.9,adult    Body mass index (BMI) 26.0-26.9, adult     BMI 26.0-26.9,adult    Candidal vaginitis 2023    Chest pain, atypical 2023    Chest pain, midsternal 2023    Chronic obstructive pulmonary disease, unspecified (Multi) 07/15/2021    COPD (chronic obstructive pulmonary disease)    Coronary artery disease     Delayed emergence from general anesthesia     Dysuria 2023    Flank pain 2023    Hip pain, right 2023    History of colonoscopy 2024    History of substance dependence (Multi) 2024    Hyperlipidemia, unspecified 10/03/2022    Hyperlipidemia    Hypertension     Hypothyroidism     Hypothyroidism, unspecified 2022    Hypothyroidism    Increased urinary frequency 2023    Other conditions influencing health status 2015    Mammogram normal    Other specified health status     No pertinent past surgical history    Other specified health status 2015    No pertinent past medical history    Other specified postprocedural states 2015    H/O colonoscopy    Personal history of other  diseases of the respiratory system     History of asthma    PONV (postoperative nausea and vomiting)     Proteinuria 2023    Recurrent urinary tract infection 2023    Sialoadenitis 2023    SOB (shortness of breath) 2023    Type 2 diabetes mellitus without complications (Multi) 10/11/2022    Diabetes mellitus    Urinary urgency 2023       Past Surgical History:   Procedure Laterality Date    CARDIAC CATHETERIZATION N/A 10/23/2023    Procedure: Left Heart Cath, With LV (42401);  Surgeon: Manpreet Garg MD;  Location: [facility] Cardiac Cath Lab;  Service: Cardiovascular;  Laterality: N/A;    HYSTERECTOMY  2015    Hysterectomy    OTHER SURGICAL HISTORY  2022    Tubal ligation    OTHER SURGICAL HISTORY  2020     section    OTHER SURGICAL HISTORY  2015    Surgery Excision Of Parotid Tumor/Gland       Patient Sexual activity questions deferred to the physician.    Family History   Problem Relation Name Age of Onset    Cancer Mother      Diabetes Mother      Pancreatic cancer Mother      Stroke Father          recurrent    Cancer Father      Other (urinary cancer) Father      Other (cardiac disorder) Other         Allergies   Allergen Reactions    Adhesive Tape-Silicones Other     blisters       Prior to Admission medications    Medication Sig Start Date End Date Taking? Authorizing Provider   amLODIPine (Norvasc) 5 mg tablet Take 1 tablet (5 mg) by mouth once daily. 24 Yes Chris Torres MD   blood sugar diagnostic (Accu-Chek Guide test strips) strip USE TO TEST BLOOD SUGAR 3 TIMES DAILY. 24  Yes Griffin Baltazar, DO   clopidogrel (Plavix) 75 mg tablet TAKE ONE TABLET BY MOUTH EVERY DAY 24  Yes Manpreet Garg MD   empagliflozin (Jardiance) 25 mg Take 1 tablet (25 mg) by mouth once daily. 24 Yes Griffin Baltazar DO   ezetimibe (Zetia) 10 mg tablet Take 1 tablet (10 mg) by mouth once daily. 24 Yes Chris Torres MD    lancets misc Use 3 times daily as directed   Yes Historical Provider, MD   levothyroxine (Synthroid, Levoxyl) 100 mcg tablet Take 1 tablet (100 mcg) by mouth once daily in the morning. Take before meals. 5/2/24  Yes Griffin Baltazar, DO   metoprolol succinate XL (Toprol-XL) 25 mg 24 hr tablet TAKE ONE TABLET BY MOUTH ONCE DAILY. DO NOT CRUSH OR CHEW. 7/10/24 7/10/25 Yes Irvin Fan MD   rosuvastatin (Crestor) 40 mg tablet TAKE ONE TABLET BY MOUTH EVERY DAY 5/6/24  Yes Irvin Fan MD   Spiriva Respimat 2.5 mcg/actuation inhaler Inhale. 3/22/16  Yes Historical Provider, MD   Symbicort 160-4.5 mcg/actuation inhaler Inhale. 3/22/16  Yes Historical Provider, MD   Ventolin HFA 90 mcg/actuation inhaler  4/13/23  Yes Historical Provider, MD   chlorhexidine (Hibiclens) 4 % external liquid Apply topically 2 times a day for 5 days. 8/28/24 9/2/24  Samantha A Meeson, APRN-CNP   chlorhexidine (Peridex) 0.12 % solution Swish and spit 15 mL night before surgery and morning of surgery 8/28/24   Samantha A Meeson, APRN-CNP   semaglutide 0.25 mg or 0.5 mg (2 mg/3 mL) pen injector Inject 0.25 mg under the skin 1 (one) time per week.  Patient not taking: Reported on 7/17/2024 6/2/24   Griffin Baltazar DO   triamcinolone (Kenalog) 0.1 % cream  7/15/22   Historical Provider, MD MANCUSO ROS:   Constitutional:   neg    Neuro/Psych:   neg    Eyes:   neg     use of corrective lenses (reading and driving)  Ears:   neg    Nose:   neg    Mouth:   neg    Throat:   neg    Neck:   neg    Cardio:   neg    Respiratory:   neg    Endocrine:   neg    GI:   neg    :   neg    Musculoskeletal:   neg    Hematologic:   neg    Skin:  neg        Physical Exam  Vitals reviewed.   Constitutional:       Appearance: Normal appearance.   HENT:      Head: Normocephalic.      Mouth/Throat:      Mouth: Mucous membranes are moist.   Eyes:      Conjunctiva/sclera: Conjunctivae normal.   Neck:      Vascular: No carotid bruit.   Cardiovascular:      Rate and  Rhythm: Normal rate and regular rhythm.      Pulses: Normal pulses.      Heart sounds: Normal heart sounds.   Pulmonary:      Effort: Pulmonary effort is normal.      Breath sounds: Normal breath sounds.   Abdominal:      Palpations: Abdomen is soft.      Tenderness: There is no abdominal tenderness.   Musculoskeletal:         General: Normal range of motion.      Cervical back: Normal range of motion.      Right lower leg: No edema.      Left lower leg: No edema.   Lymphadenopathy:      Cervical: No cervical adenopathy.   Skin:     General: Skin is warm and dry.      Capillary Refill: Capillary refill takes less than 2 seconds.   Neurological:      General: No focal deficit present.      Mental Status: She is alert and oriented to person, place, and time.   Psychiatric:         Mood and Affect: Mood normal.         Behavior: Behavior normal.         Thought Content: Thought content normal.         Judgment: Judgment normal.          PAT AIRWAY:   Airway:     Mallampati::  III    Neck ROM::  Full  normal        Visit Vitals  /68   Pulse 69   Temp 36.9 °C (98.4 °F)       DASI Risk Score      Flowsheet Row Most Recent Value   DASI SCORE 32.95   METS Score (Will be calculated only when all the questions are answered) 6.8          Caprini DVT Assessment      Flowsheet Row Most Recent Value   DVT Score 7   Current Status Major surgery planned, including arthroscopic and laproscopic (1-2 hours), COPD   History COPD   Age 60-75 years   BMI 30 or less          Modified Frailty Index      Flowsheet Row Most Recent Value   Modified Frailty Index Calculator .3636          CHADS2 Stroke Risk  Current as of 2 hours ago        N/A 3 to 100%: High Risk   2 to < 3%: Medium Risk   0 to < 2%: Low Risk     Last Change: N/A          This score determines the patient's risk of having a stroke if the patient has atrial fibrillation.        This score is not applicable to this patient. Components are not calculated.          Revised  Cardiac Risk Index      Flowsheet Row Most Recent Value   Revised Cardiac Risk Calculator 2          Apfel Simplified Score      Flowsheet Row Most Recent Value   Apfel Simplified Score Calculator 3          Risk Analysis Index Results This Encounter    No data found in the last 1 encounters.       Stop Bang Score      Flowsheet Row Most Recent Value   Do you snore loudly? 0   Do you often feel tired or fatigued after your sleep? 0   Has anyone ever observed you stop breathing in your sleep? 0   Do you have or are you being treated for high blood pressure? 1   Recent BMI (Calculated) 24.4   Is BMI greater than 35 kg/m2? 0=No   Age older than 50 years old? 1=Yes   Is your neck circumference greater than 17 inches (Male) or 16 inches (Female)? 1   Gender - Male 0=No   STOP-BANG Total Score 3          COPD with no recent exacerbations, asthma, postop nausea and vomiting,     Assessment and Plan:   Neuro:  No neurologic diagnoses, however, the patient is at an increased risk for post operative delirium secondary to age >/= 65 and type and duration of surgery.  Preoperative brain exercise educational handout provided to patient.    The patient is at an increased risk for perioperative stroke secondary to cardiac disease, increased age, HTN, HLD, DM , female sex , vascular surgery , and general anesthesia.    HEENT/Airway:  No diagnosis or significant findings on chart review or clinical presentation and evaluation.    Cardiovascular: Asymptomatic right carotid artery stenosis scheduled for surgery.  Carotid US 05/13/24  CONCLUSIONS:  Right Carotid: Findings are consistent with greater than 70% stenosis of the right proximal internal carotid artery. Turbulent flow seen by color Doppler. Right external carotid artery appears patent with no evidence of stenosis. The right vertebral artery is patent with antegrade flow. No evidence of hemodynamically significant stenosis in the right subclavian artery.  Left Carotid:  Findings are consistent with 50 to 69% stenosis of the left proximal internal carotid artery. Left external carotid artery appears patent with no evidence of stenosis. The left vertebral artery is patent with antegrade flow. No evidence of hemodynamically significant stenosis in the left subclavian artery.    CAD -managed on metoprolol (continue), clopidogrel (continue) and rosuvastatin (continue) per SCIP protocol. Patient with CT calcium score 589 in 2019 with subsequent PCI to the LAD in the setting of unstable angina with preserved LV function.  She presented in March 2022 with jaw discomfort and left arm discomfort. She had a negative Lexiscan nuclear stress test. This progressively worsened over the course of the year. She subsequently had a cardiac catheterization April 2023 that showed a severe lesion in the mid LAD. She underwent PCI of her LAD. She had resolution of her jaw discomfort and left arm discomfort after PCI. She then noted recurrent jaw discomfort and left arm discomfort in October 2023. Repeat heart catheterization revealed a widely patent LAD stent. She had residual moderate disease in the ostium of the LAD and mid to distal LAD. RFR was borderline abnormal distally at 0.89. RFR in the mid vessel just distal to the stent was 0.92. She was started on metoprolol and her jaw discomfort and arm discomfort resolved.     Hypertension managed on amlodipine (continue).  Hyperlipidemia managed on ezetimibe (hold 24 hours) and rosuvastatin (continue).    EKG 07/17/2024 normal sinus rhythm with right bundle desirae block and no acute changes when compared to prior.  No additional preoperative testing is currently indicated.    Follows with cardiology, Dr. Chris Torres, with last visit 07/17/2024 at which time patient is stable and to follow-up in 4 months.  Cardiac clearance scanned into media tab 06/03/2024.    METS are 6.8    RCRI  2 which is 10.1% 30 day risk of MACE (risk for cardiac death, nonfatal  myocardial infarction, and nonfactal cardiac arrest    FREDERICK score which indicates a 0.8% risk of intraoperative or 30-day postoperative MACE      Pulmonary: Asthma and COPD that are well-controlled with no recent exacerbations.  Patient managed on Spiriva (continue), Symbicort (continue) and rescue Ventolin inhaler (continue).  Patient uses rescue inhaler on a weekly basis.  Preoperative deep breathing educational handout provided to patient.    ARISCAT:   11   points which is a low (1.6%) risk of in-hospital post-op pulmonary complications     PRODIGY:  12  points which is a intermediate risk of post op opioid induced respiratory depression episodes    STOP BANG:   3  points which is a intermediate risk for moderate to severe DARRION. Patient to discuss risk factors with pcp post op.     Renal: No diagnosis or significant findings on chart review or clinical presentation and evaluation, however, the patient is at increased risk of perioperative renal complications secondary to age>/= 56, HTN, and diabetes. Preventative measures include preoperative BP control and hydration.     Endocrine:  NIDDM managed on empagliflozin (hold 3 days).  Patient prescribed semaglutide but does not take this which she plans to discuss with her PCP.  Patient knows not to start this medication prior to surgery.  A1c on 07/10/2024 7.5%.    Hypothyroidism managed on levothyroxine (continue).  TSH on 07/10/2024 low at 0.07 however free T4 within normal limits at 1.44 therefore patient subclinical and is currently asymptomatic.  Will continue to follow with PCP.    Patient given Medrol Dosepak in March 2024 for URI consider stress dosing.    Hematologic:   No diagnosis or significant findings on chart review or clinical presentation and evaluation however see below risk screening tool.  Preoperative DVT educational handout provided to patient.    Caprini Score:  7  points which is a highest risk of perioperative VTE    Gastrointestinal: No  diagnosis or significant findings on chart review or clinical presentation and evaluation.    EAT-10 score of   0 - self-perceived oropharyngeal dysphagia scale (0-40)     Apfel: 3 points 61% risk for post operative N/V    Infectious disease:  No diagnosis or significant findings on chart review or clinical presentation and evaluation.     Musculoskeletal: No diagnosis or significant findings on chart review or clinical presentation and evaluation.      Other:   Possible delayed emergence 25 years ago following left parotidectomy        Labs ordered  Results for orders placed or performed in visit on 08/28/24 (from the past 96 hour(s))   Type And Screen   Result Value Ref Range    ABO TYPE A     Rh TYPE NEG     ANTIBODY SCREEN NEG    Coagulation Screen   Result Value Ref Range    Protime 11.3 9.8 - 12.8 seconds    INR 1.0 0.9 - 1.1    aPTT 27 27 - 38 seconds   CBC   Result Value Ref Range    WBC 6.9 4.4 - 11.3 x10*3/uL    nRBC 0.0 0.0 - 0.0 /100 WBCs    RBC 5.02 4.00 - 5.20 x10*6/uL    Hemoglobin 14.3 12.0 - 16.0 g/dL    Hematocrit 43.7 36.0 - 46.0 %    MCV 87 80 - 100 fL    MCH 28.5 26.0 - 34.0 pg    MCHC 32.7 32.0 - 36.0 g/dL    RDW 14.7 (H) 11.5 - 14.5 %    Platelets 222 150 - 450 x10*3/uL   Basic Metabolic Panel   Result Value Ref Range    Glucose 114 (H) 74 - 99 mg/dL    Sodium 139 136 - 145 mmol/L    Potassium 4.7 3.5 - 5.3 mmol/L    Chloride 101 98 - 107 mmol/L    Bicarbonate 24 21 - 32 mmol/L    Anion Gap 19 10 - 20 mmol/L    Urea Nitrogen 12 6 - 23 mg/dL    Creatinine 0.57 0.50 - 1.05 mg/dL    eGFR >90 >60 mL/min/1.73m*2    Calcium 10.0 8.6 - 10.6 mg/dL   Staphylococcus aureus/MRSA colonization, Culture    Specimen: Nares/Axilla/Groin; Swab   Result Value Ref Range    Staph/MRSA Screen Culture No Staphylococcus aureus isolated         Lab Results   Component Value Date    HGBA1C 7.5 (H) 07/10/2024

## 2024-08-28 NOTE — PREPROCEDURE INSTRUCTIONS
Thank you for visiting [facility]) today for your pre-procedure evaluation, you were seen by     Samantha Meeson, MSN, NP-C  Adult-Gerontology Nurse Practitioner II  Department of Anesthesiology and Perioperative Medicine  Main phone [phone]  Direct phone [phone]  Fax [phone]     This summary includes instructions and information to aid you during your perioperative period.  Please read carefully. If you have any questions about your visit today, please call the number listed above.  If you become ill or have any changes to your health before your surgery, please contact your primary care provider and alert your surgeon.    Preparing for your Surgery       Exercises  Preoperative Deep Breathing Exercises  Why it is important to do deep breathing exercises before my surgery?  Deep breathing exercises strengthen your breathing muscles.  This helps you to recover after your surgery and decreases the chance of breathing complications.  How are the deep breathing exercises done?  Sit straight with your back supported.  Breathe in deeply and slowly through your nose. Your lower rib cage should expand and your abdomen may move forward.  Hold that breath for 3 to 5 seconds.  Breathe out through pursed lips, slowly and completely.  Rest and repeat 10 times every hour while awake.  Rest longer if you become dizzy or lightheaded.       Incentive Spirometer   You were provided with an incentive spirometer in [facility], please follow the below instructions.   You were not provided an incentive spirometer in [facility], please disregard the incentive spirometer instructions  What is an incentive spirometer?  An incentive spirometer is a device used before and after surgery to “exercise” your lungs.  It helps you to take deeper breaths to expand your lungs.  Below is an example of a basic incentive spirometer.  The device you receive may differ slightly but they all function the  same.    Why do I need to use an incentive spirometer?  Using your incentive spirometer prepares your lungs for surgery and helps prevent lung problems after surgery.  How do I use my incentive spirometer?  When you're using your incentive spirometer, make sure to breathe through your mouth. If you breathe through your nose, the incentive spirometer won't work properly. You can hold your nose if you have trouble.  If you feel dizzy at any time, stop and rest. Try again at a later time.  Follow the steps below:  Set up your incentive spirometer, expand the flexible tubing and connect to the outlet.  Sit upright in a chair or bed. Hold the incentive spirometer at eye level.   Put the mouthpiece in your mouth and close your lips tightly around it. Slowly breathe out (exhale) completely.  Breathe in (inhale) slowly through your mouth as deeply as you can. As you take a breath, you will see the piston rise inside the large column. While the piston rises, the indicator should move upwards. It should stay in between the 2 arrows (see Figure).  Try to get the piston as high as you can, while keeping the indicator between the arrows.   If the indicator doesn't stay between the arrows, you're breathing either too fast or too slow.  When you get it as high as you can, hold your breath for 10 seconds, or as long as possible. While you're holding your breath, the piston will slowly fall to the base of the spirometer.  Once the piston reaches the bottom of the spirometer, breathe out slowly through your mouth. Rest for a few seconds.  Repeat 10 times. Try to get the piston to the same level with each breath.  Repeat every hour while awake  You can carefully clean the outside of the mouthpiece with an alcohol wipe or soap and water.      Preoperative Brain Exercises    What are brain exercises?  A brain exercise is any activity that engages your thinking (cognitive) skills.    What types of activities are considered brain  exercises?  Jigsaw puzzles, crossword puzzles, word jumble, memory games, word search, and many more.  Many can be found free online or on your phone via a mobile yves.    Why should I do brain exercises before my surgery?  More recent research has shown brain exercise before surgery can lower the risk of postoperative delirium (confusion) which can be especially important for older adults.  Patients who did brain exercises for 5 to 10 hours the days before surgery, cut their risk of postoperative delirium in half up to 1 week after surgery.    Sit-to-Stand Exercise    What is the sit-to-stand exercise?  The sit-to-stand exercise strengthens the muscles of your lower body and muscles in the center of your body (core muscles for stability) helping to maintain and improve your strength and mobility.  How do I do the sit-to-stand exercise?  The goal is to do this exercise without using your arms or hands.  If this is too difficult, use your arms and hands or a chair with armrests to help slowly push yourself to the standing position and lower yourself back to the sitting position. As the movement becomes easier use your arms and hands less.    Steps to the sit-to-stand exercise  Sit up tall in a sturdy chair, knees bent, feet flat on the floor shoulder-width apart.  Shift your hips/pelvis forward in the chair to correctly position yourself for the next movement.  Lean forward at your hips.  Stand up straight putting equal weight on both feet.  Check to be sure you are properly aligned with the chair, in a slow controlled movement sit back down.  Repeat this exercise 10-15 times.  If needed you can do it fewer times until your strength improves.  Rest for 1 minute.  Do another 10-15 sit-to-stand exercises.  Try to do this in the morning and evening.        Instructions    Preoperative Fasting Guidelines    Why must I stop eating and drinking near surgery time?  With sedation, food or liquid in your stomach can enter your  lungs causing serious complications  Food can increase nausea and vomiting  When do I need to stop eating and drinking before my surgery?      Do not eat any food after midnight the night before your surgery/procedure. You may have up to 13.5 ounces of clear liquid until TWO hours before your instructed arrival time to the hospital.  This includes water, black tea/coffee, (no milk or cream) apple juice, and electrolyte drinks (Gatorade). You may chew gum until TWO hours before your surgery/procedure            Simple things you can do to help prevent blood clots     Blood clots are blockages that can form in the body's veins. When a blood clot forms in your deep veins, it may be called a deep vein thrombosis, or DVT for short. Blood clots can happen in any part of the body where blood flows, but they are most common in the arms and legs. If a piece of a blood clot breaks free and travels to the lungs, it is called a pulmonary embolus (PE). A PE can be a very serious problem.         Being in the hospital or having surgery can raise your chances of getting a blood clot because you may not be well enough to move around as much as you normally do.         Ways you can help prevent blood clots in the hospital       Wearing SCDs  SCDs stands for Sequential Compression Devices.   SCDs are special sleeves that wrap around your legs. They attach to a pump that fills them with air to gently squeeze your legs every few minutes.  This helps return the blood in your legs to your heart.   SCDs should only be taken off when walking or bathing. SCDs may not be comfortable, but they can help save your life.              Pump SCD leg sleeves  Wearing compression stockings - if your doctor orders them. These special snug-fitting stockings gently squeeze your legs to help blood flow.       Walking. Walking helps move the blood in your legs.   If your doctor says it is ok, try walking the halls at least   5 times a day. Ask us to help  you get up, so you don't fall.      Taking any blood-thinning medicines your doctor orders.              Ways you can help prevent blood clots at home         Wearing compression stockings - if your doctor orders them.   Walking - to help move the blood in your legs.    Taking any blood-thinning medicines your doctor orders.      Signs of a blood clot or PE    Tell your doctor or nurse right away if you have any of the problems listed below.         If you are at home, seek medical care right away. Call 911 for chest pain or problems breathing.            Signs of a blood clot (DVT) - such as pain, swelling, redness, or warmth in your arm or legs.  Signs of a pulmonary embolism (PE) - such as chest pain or feeling short of breath      Tobacco and Alcohol;  Do not drink alcohol or smoke within 24 hours of surgery.  It is best to quit smoking for as long as possible before any surgery or procedure.      The Week before Surgery        Seven days before Surgery  Check your [facility] medication instructions  Do the exercises provided to you by [facility]   Arrange for a responsible, adult licensed  to take you home after surgery and stay with you for 24 hours.  You will not be permitted to drive yourself home if you have received any anesthetic/sedation  Six days before surgery  Check your [facility] medication instructions  Do the exercises provided to you by [facility]   Start using Chlorhexidene (CHG) body wash if prescribed  Five days before surgery  Check your [facility] medication instructions  Do the exercises provided to you by [facility]   Continue to use CHG body wash if prescribed  Three days before surgery  Check your [facility] medication instructions  Do the exercises provided to you by [facility]   Continue to use CHG body wash if prescribed  Two days before surgery  Check your [facility] medication instructions  Do the exercises provided to you by [facility]   Continue to use CHG body wash if prescribed    The Day before Surgery       Check your [facility] medication and  all other [facility] instructions including when to stop eating and drinking  You will be called with your arrival time for surgery in the late afternoon.  If you do not receive a call please reach out to your surgeon's office.  Do not smoke or drink 24 hours before surgery  Prepare items to bring with you to the hospital  Shower with your chlorhexidine wash if prescribed  Brush your teeth and use your chlorhexidine dental rinse if prescribed    The Day of Surgery       Check your [facility] medication instructions  Ensure you follow the instructions for when to stop eating and drinking  Shower, if prescribed use CHG.  Do not apply any lotions, creams, moisturizers, perfume or deodorant  Brush your teeth and use your CHG dental rinse if prescribed  Wear loose comfortable clothing  Avoid make-up  Remove  jewelry and piercings, consider professional piercing removal with a plastic spacer if needed  Bring photo ID and Insurance card  Bring an accurate medication list that includes medication dose, frequency and allergies  Bring a copy of your advanced directives (will, health care power of )  Bring any devices and controllers as well as medical devices you have been provided with for surgery (CPAP, slings, braces, etc.)  Dentures, eyeglasses, and contacts will be removed before surgery, please bring cases for contacts or glasses

## 2024-08-29 ENCOUNTER — TELEPHONE (OUTPATIENT)
Dept: PRIMARY CARE | Facility: CLINIC | Age: 74
End: 2024-08-29
Payer: MEDICARE

## 2024-08-29 DIAGNOSIS — E11.9 TYPE 2 DIABETES MELLITUS WITHOUT COMPLICATION, UNSPECIFIED WHETHER LONG TERM INSULIN USE (MULTI): Primary | ICD-10-CM

## 2024-08-29 LAB — STAPHYLOCOCCUS SPEC CULT: NORMAL

## 2024-08-29 NOTE — TELEPHONE ENCOUNTER
PT WOULD LIKE ORDER FOR A1C, WOULD LIKE TO HAVE DONE TOMORROW. PT HAVING SURGERY 9/5 AND DC JARDIANCE 9/1 X 5 DAYS

## 2024-08-30 ENCOUNTER — LAB (OUTPATIENT)
Dept: LAB | Facility: LAB | Age: 74
End: 2024-08-30
Payer: MEDICARE

## 2024-08-30 DIAGNOSIS — E11.9 TYPE 2 DIABETES MELLITUS WITHOUT COMPLICATION, UNSPECIFIED WHETHER LONG TERM INSULIN USE (MULTI): ICD-10-CM

## 2024-08-30 LAB
EST. AVERAGE GLUCOSE BLD GHB EST-MCNC: 154 MG/DL
HBA1C MFR BLD: 7 %

## 2024-08-30 PROCEDURE — 83036 HEMOGLOBIN GLYCOSYLATED A1C: CPT

## 2024-08-30 PROCEDURE — 36415 COLL VENOUS BLD VENIPUNCTURE: CPT

## 2024-09-04 ENCOUNTER — ANESTHESIA EVENT (OUTPATIENT)
Dept: OPERATING ROOM | Facility: HOSPITAL | Age: 74
End: 2024-09-04
Payer: MEDICARE

## 2024-09-04 NOTE — PROGRESS NOTES
Pharmacy Medication History Review    Mare Ching is a 73 y.o. female who is planned to be admitted for Carotid stenosis, asymptomatic, right. Pharmacy called the patient prior to their scheduled procedure and reviewed the patient's [id number] medications for accuracy.    Medications ADDED:  none  Medications CHANGED:  none  Medications REMOVED:   none    Please review updated prior to admission medication list and comments regarding how patient may be taking medications differently by going to Admission tab --> Admission Orders --> Admit Orders / Review prior to admission medications.     Preferred pharmacy and allergies to be confirmed with patient by nursing the day of procedure.     Sources used to complete the med history include spoke with patient, surescripts, oarrs, care everywhere    Below are additional concerns with the patient's PTA list.  none    Batsheva Carias   [facility]  Please reach out via Secure Chat for questions

## 2024-09-05 ENCOUNTER — ANESTHESIA (OUTPATIENT)
Dept: OPERATING ROOM | Facility: HOSPITAL | Age: 74
End: 2024-09-05
Payer: MEDICARE

## 2024-09-05 ENCOUNTER — HOSPITAL ENCOUNTER (INPATIENT)
Facility: HOSPITAL | Age: 74
LOS: 1 days | Discharge: HOME | End: 2024-09-06
Attending: SURGERY | Admitting: SURGERY
Payer: MEDICARE

## 2024-09-05 ENCOUNTER — HOSPITAL ENCOUNTER (INPATIENT)
Dept: NEUROLOGY | Facility: HOSPITAL | Age: 74
Discharge: HOME | End: 2024-09-05
Payer: MEDICARE

## 2024-09-05 DIAGNOSIS — Z98.890 S/P CAROTID ENDARTERECTOMY: ICD-10-CM

## 2024-09-05 DIAGNOSIS — I65.21 CAROTID STENOSIS, ASYMPTOMATIC, RIGHT: Primary | ICD-10-CM

## 2024-09-05 DIAGNOSIS — I25.10 CAD IN NATIVE ARTERY: ICD-10-CM

## 2024-09-05 LAB
ACT BLD: 137 SEC (ref 83–199)
ACT BLD: 137 SEC (ref 83–199)
ACT BLD: 244 SEC (ref 83–199)
ACT BLD: 260 SEC (ref 83–199)
GLUCOSE BLD MANUAL STRIP-MCNC: 175 MG/DL (ref 74–99)
GLUCOSE BLD MANUAL STRIP-MCNC: 222 MG/DL (ref 74–99)

## 2024-09-05 PROCEDURE — 2500000005 HC RX 250 GENERAL PHARMACY W/O HCPCS: Performed by: ANESTHESIOLOGIST ASSISTANT

## 2024-09-05 PROCEDURE — 7100000002 HC RECOVERY ROOM TIME - EACH INCREMENTAL 1 MINUTE: Performed by: SURGERY

## 2024-09-05 PROCEDURE — 2060000001 HC INTERMEDIATE ICU ROOM DAILY

## 2024-09-05 PROCEDURE — 2500000005 HC RX 250 GENERAL PHARMACY W/O HCPCS: Performed by: STUDENT IN AN ORGANIZED HEALTH CARE EDUCATION/TRAINING PROGRAM

## 2024-09-05 PROCEDURE — 2500000004 HC RX 250 GENERAL PHARMACY W/ HCPCS (ALT 636 FOR OP/ED): Performed by: STUDENT IN AN ORGANIZED HEALTH CARE EDUCATION/TRAINING PROGRAM

## 2024-09-05 PROCEDURE — 7100000001 HC RECOVERY ROOM TIME - INITIAL BASE CHARGE: Performed by: SURGERY

## 2024-09-05 PROCEDURE — 2500000002 HC RX 250 W HCPCS SELF ADMINISTERED DRUGS (ALT 637 FOR MEDICARE OP, ALT 636 FOR OP/ED): Performed by: STUDENT IN AN ORGANIZED HEALTH CARE EDUCATION/TRAINING PROGRAM

## 2024-09-05 PROCEDURE — 95940 IONM IN OPERATNG ROOM 15 MIN: CPT | Mod: TC

## 2024-09-05 PROCEDURE — 3700000002 HC GENERAL ANESTHESIA TIME - EACH INCREMENTAL 1 MINUTE: Performed by: SURGERY

## 2024-09-05 PROCEDURE — 03CK3ZZ EXTIRPATION OF MATTER FROM RIGHT INTERNAL CAROTID ARTERY, PERCUTANEOUS APPROACH: ICD-10-PCS | Performed by: SURGERY

## 2024-09-05 PROCEDURE — 85347 COAGULATION TIME ACTIVATED: CPT

## 2024-09-05 PROCEDURE — 3600000009 HC OR TIME - EACH INCREMENTAL 1 MINUTE - PROCEDURE LEVEL FOUR: Performed by: SURGERY

## 2024-09-05 PROCEDURE — 3600000004 HC OR TIME - INITIAL BASE CHARGE - PROCEDURE LEVEL FOUR: Performed by: SURGERY

## 2024-09-05 PROCEDURE — 82947 ASSAY GLUCOSE BLOOD QUANT: CPT

## 2024-09-05 PROCEDURE — 2500000004 HC RX 250 GENERAL PHARMACY W/ HCPCS (ALT 636 FOR OP/ED)

## 2024-09-05 PROCEDURE — 2720000007 HC OR 272 NO HCPCS: Performed by: SURGERY

## 2024-09-05 PROCEDURE — 2500000004 HC RX 250 GENERAL PHARMACY W/ HCPCS (ALT 636 FOR OP/ED): Performed by: ANESTHESIOLOGIST ASSISTANT

## 2024-09-05 PROCEDURE — 3700000001 HC GENERAL ANESTHESIA TIME - INITIAL BASE CHARGE: Performed by: SURGERY

## 2024-09-05 PROCEDURE — 88311 DECALCIFY TISSUE: CPT | Mod: TC,SUR | Performed by: SURGERY

## 2024-09-05 PROCEDURE — 35301 RECHANNELING OF ARTERY: CPT | Performed by: SURGERY

## 2024-09-05 PROCEDURE — 2780000003 HC OR 278 NO HCPCS: Performed by: SURGERY

## 2024-09-05 RX ORDER — ONDANSETRON HYDROCHLORIDE 2 MG/ML
4 INJECTION, SOLUTION INTRAVENOUS ONCE AS NEEDED
Status: DISCONTINUED | OUTPATIENT
Start: 2024-09-05 | End: 2024-09-05 | Stop reason: HOSPADM

## 2024-09-05 RX ORDER — HYDRALAZINE HYDROCHLORIDE 20 MG/ML
5 INJECTION INTRAMUSCULAR; INTRAVENOUS ONCE
Status: COMPLETED | OUTPATIENT
Start: 2024-09-05 | End: 2024-09-05

## 2024-09-05 RX ORDER — OXYCODONE HYDROCHLORIDE 5 MG/1
10 TABLET ORAL EVERY 4 HOURS PRN
Status: DISCONTINUED | OUTPATIENT
Start: 2024-09-05 | End: 2024-09-05 | Stop reason: HOSPADM

## 2024-09-05 RX ORDER — DIPHENHYDRAMINE HYDROCHLORIDE 50 MG/ML
12.5 INJECTION INTRAMUSCULAR; INTRAVENOUS ONCE AS NEEDED
Status: DISCONTINUED | OUTPATIENT
Start: 2024-09-05 | End: 2024-09-05 | Stop reason: HOSPADM

## 2024-09-05 RX ORDER — NALOXONE HYDROCHLORIDE 0.4 MG/ML
0.2 INJECTION, SOLUTION INTRAMUSCULAR; INTRAVENOUS; SUBCUTANEOUS EVERY 5 MIN PRN
Status: DISCONTINUED | OUTPATIENT
Start: 2024-09-05 | End: 2024-09-06 | Stop reason: HOSPADM

## 2024-09-05 RX ORDER — CLOPIDOGREL BISULFATE 75 MG/1
75 TABLET ORAL DAILY
Status: DISCONTINUED | OUTPATIENT
Start: 2024-09-06 | End: 2024-09-05

## 2024-09-05 RX ORDER — PHENYLEPHRINE HYDROCHLORIDE 10 MG/ML
INJECTION INTRAVENOUS AS NEEDED
Status: DISCONTINUED | OUTPATIENT
Start: 2024-09-05 | End: 2024-09-05

## 2024-09-05 RX ORDER — ALBUTEROL SULFATE 0.83 MG/ML
2.5 SOLUTION RESPIRATORY (INHALATION) ONCE AS NEEDED
Status: DISCONTINUED | OUTPATIENT
Start: 2024-09-05 | End: 2024-09-05 | Stop reason: HOSPADM

## 2024-09-05 RX ORDER — OXYCODONE HYDROCHLORIDE 5 MG/1
5 TABLET ORAL EVERY 6 HOURS PRN
Status: DISCONTINUED | OUTPATIENT
Start: 2024-09-05 | End: 2024-09-06 | Stop reason: HOSPADM

## 2024-09-05 RX ORDER — HEPARIN SODIUM 5000 [USP'U]/ML
5000 INJECTION, SOLUTION INTRAVENOUS; SUBCUTANEOUS EVERY 8 HOURS
Status: DISCONTINUED | OUTPATIENT
Start: 2024-09-05 | End: 2024-09-06 | Stop reason: HOSPADM

## 2024-09-05 RX ORDER — HYDROMORPHONE HYDROCHLORIDE 1 MG/ML
0.4 INJECTION, SOLUTION INTRAMUSCULAR; INTRAVENOUS; SUBCUTANEOUS EVERY 5 MIN PRN
Status: DISCONTINUED | OUTPATIENT
Start: 2024-09-05 | End: 2024-09-05 | Stop reason: HOSPADM

## 2024-09-05 RX ORDER — HEPARIN SODIUM 1000 [USP'U]/ML
INJECTION, SOLUTION INTRAVENOUS; SUBCUTANEOUS AS NEEDED
Status: DISCONTINUED | OUTPATIENT
Start: 2024-09-05 | End: 2024-09-05

## 2024-09-05 RX ORDER — FENTANYL CITRATE 50 UG/ML
INJECTION, SOLUTION INTRAMUSCULAR; INTRAVENOUS AS NEEDED
Status: DISCONTINUED | OUTPATIENT
Start: 2024-09-05 | End: 2024-09-05

## 2024-09-05 RX ORDER — HYDRALAZINE HYDROCHLORIDE 20 MG/ML
5 INJECTION INTRAMUSCULAR; INTRAVENOUS EVERY 30 MIN PRN
Status: DISCONTINUED | OUTPATIENT
Start: 2024-09-05 | End: 2024-09-05 | Stop reason: HOSPADM

## 2024-09-05 RX ORDER — ALBUTEROL SULFATE 90 UG/1
2 INHALANT RESPIRATORY (INHALATION) EVERY 6 HOURS PRN
Status: DISCONTINUED | OUTPATIENT
Start: 2024-09-05 | End: 2024-09-06 | Stop reason: HOSPADM

## 2024-09-05 RX ORDER — ROSUVASTATIN CALCIUM 20 MG/1
40 TABLET, COATED ORAL NIGHTLY
Status: DISCONTINUED | OUTPATIENT
Start: 2024-09-05 | End: 2024-09-06 | Stop reason: HOSPADM

## 2024-09-05 RX ORDER — ONDANSETRON HYDROCHLORIDE 2 MG/ML
INJECTION, SOLUTION INTRAVENOUS AS NEEDED
Status: DISCONTINUED | OUTPATIENT
Start: 2024-09-05 | End: 2024-09-05

## 2024-09-05 RX ORDER — CLOPIDOGREL BISULFATE 75 MG/1
75 TABLET ORAL DAILY
Status: DISCONTINUED | OUTPATIENT
Start: 2024-09-06 | End: 2024-09-06 | Stop reason: HOSPADM

## 2024-09-05 RX ORDER — AMLODIPINE BESYLATE 5 MG/1
5 TABLET ORAL DAILY
Status: DISCONTINUED | OUTPATIENT
Start: 2024-09-06 | End: 2024-09-06 | Stop reason: HOSPADM

## 2024-09-05 RX ORDER — SODIUM CHLORIDE, SODIUM LACTATE, POTASSIUM CHLORIDE, CALCIUM CHLORIDE 600; 310; 30; 20 MG/100ML; MG/100ML; MG/100ML; MG/100ML
100 INJECTION, SOLUTION INTRAVENOUS CONTINUOUS
Status: DISCONTINUED | OUTPATIENT
Start: 2024-09-05 | End: 2024-09-05 | Stop reason: HOSPADM

## 2024-09-05 RX ORDER — LIDOCAINE HYDROCHLORIDE 20 MG/ML
INJECTION, SOLUTION INFILTRATION; PERINEURAL AS NEEDED
Status: DISCONTINUED | OUTPATIENT
Start: 2024-09-05 | End: 2024-09-05

## 2024-09-05 RX ORDER — EZETIMIBE 10 MG/1
10 TABLET ORAL DAILY
Status: DISCONTINUED | OUTPATIENT
Start: 2024-09-06 | End: 2024-09-06 | Stop reason: HOSPADM

## 2024-09-05 RX ORDER — MIDAZOLAM HYDROCHLORIDE 1 MG/ML
INJECTION INTRAMUSCULAR; INTRAVENOUS CONTINUOUS PRN
Status: DISCONTINUED | OUTPATIENT
Start: 2024-09-05 | End: 2024-09-05

## 2024-09-05 RX ORDER — LEVOTHYROXINE SODIUM 100 UG/1
100 TABLET ORAL
Status: DISCONTINUED | OUTPATIENT
Start: 2024-09-06 | End: 2024-09-06 | Stop reason: HOSPADM

## 2024-09-05 RX ORDER — ONDANSETRON HYDROCHLORIDE 2 MG/ML
4 INJECTION, SOLUTION INTRAVENOUS EVERY 6 HOURS PRN
Status: DISCONTINUED | OUTPATIENT
Start: 2024-09-05 | End: 2024-09-06 | Stop reason: HOSPADM

## 2024-09-05 RX ORDER — MEPERIDINE HYDROCHLORIDE 25 MG/ML
12.5 INJECTION INTRAMUSCULAR; INTRAVENOUS; SUBCUTANEOUS EVERY 10 MIN PRN
Status: DISCONTINUED | OUTPATIENT
Start: 2024-09-05 | End: 2024-09-05 | Stop reason: HOSPADM

## 2024-09-05 RX ORDER — SODIUM CHLORIDE, SODIUM GLUCONATE, SODIUM ACETATE, POTASSIUM CHLORIDE AND MAGNESIUM CHLORIDE 30; 37; 368; 526; 502 MG/100ML; MG/100ML; MG/100ML; MG/100ML; MG/100ML
INJECTION, SOLUTION INTRAVENOUS CONTINUOUS PRN
Status: DISCONTINUED | OUTPATIENT
Start: 2024-09-05 | End: 2024-09-05

## 2024-09-05 RX ORDER — ROCURONIUM BROMIDE 10 MG/ML
INJECTION, SOLUTION INTRAVENOUS AS NEEDED
Status: DISCONTINUED | OUTPATIENT
Start: 2024-09-05 | End: 2024-09-05

## 2024-09-05 RX ORDER — PHENYLEPHRINE 10 MG/250 ML(40 MCG/ML)IN 0.9 % SOD.CHLORIDE INTRAVENOUS
CONTINUOUS PRN
Status: DISCONTINUED | OUTPATIENT
Start: 2024-09-05 | End: 2024-09-05

## 2024-09-05 RX ORDER — LABETALOL HYDROCHLORIDE 5 MG/ML
20 INJECTION, SOLUTION INTRAVENOUS EVERY 2 HOUR PRN
Status: DISCONTINUED | OUTPATIENT
Start: 2024-09-05 | End: 2024-09-06 | Stop reason: HOSPADM

## 2024-09-05 RX ORDER — LABETALOL HYDROCHLORIDE 5 MG/ML
5 INJECTION, SOLUTION INTRAVENOUS ONCE AS NEEDED
Status: DISCONTINUED | OUTPATIENT
Start: 2024-09-05 | End: 2024-09-05 | Stop reason: HOSPADM

## 2024-09-05 RX ORDER — OXYCODONE HYDROCHLORIDE 5 MG/1
5 TABLET ORAL EVERY 4 HOURS PRN
Status: DISCONTINUED | OUTPATIENT
Start: 2024-09-05 | End: 2024-09-05 | Stop reason: HOSPADM

## 2024-09-05 RX ORDER — PROTAMINE SULFATE 10 MG/ML
INJECTION, SOLUTION INTRAVENOUS AS NEEDED
Status: DISCONTINUED | OUTPATIENT
Start: 2024-09-05 | End: 2024-09-05

## 2024-09-05 RX ORDER — CEFAZOLIN 1 G/1
INJECTION, POWDER, FOR SOLUTION INTRAVENOUS AS NEEDED
Status: DISCONTINUED | OUTPATIENT
Start: 2024-09-05 | End: 2024-09-05

## 2024-09-05 RX ORDER — SODIUM CHLORIDE, SODIUM LACTATE, POTASSIUM CHLORIDE, CALCIUM CHLORIDE 600; 310; 30; 20 MG/100ML; MG/100ML; MG/100ML; MG/100ML
20 INJECTION, SOLUTION INTRAVENOUS CONTINUOUS
Status: DISCONTINUED | OUTPATIENT
Start: 2024-09-05 | End: 2024-09-05

## 2024-09-05 RX ORDER — ACETAMINOPHEN 325 MG/1
650 TABLET ORAL EVERY 6 HOURS SCHEDULED
Status: DISCONTINUED | OUTPATIENT
Start: 2024-09-05 | End: 2024-09-06 | Stop reason: HOSPADM

## 2024-09-05 RX ORDER — HYDROMORPHONE HYDROCHLORIDE 1 MG/ML
0.2 INJECTION, SOLUTION INTRAMUSCULAR; INTRAVENOUS; SUBCUTANEOUS EVERY 5 MIN PRN
Status: DISCONTINUED | OUTPATIENT
Start: 2024-09-05 | End: 2024-09-05 | Stop reason: HOSPADM

## 2024-09-05 RX ORDER — FLUTICASONE FUROATE AND VILANTEROL 200; 25 UG/1; UG/1
1 POWDER RESPIRATORY (INHALATION)
Status: DISCONTINUED | OUTPATIENT
Start: 2024-09-05 | End: 2024-09-06 | Stop reason: HOSPADM

## 2024-09-05 RX ORDER — DROPERIDOL 2.5 MG/ML
0.62 INJECTION, SOLUTION INTRAMUSCULAR; INTRAVENOUS ONCE AS NEEDED
Status: DISCONTINUED | OUTPATIENT
Start: 2024-09-05 | End: 2024-09-05 | Stop reason: HOSPADM

## 2024-09-05 RX ORDER — HYDROMORPHONE HYDROCHLORIDE 1 MG/ML
INJECTION, SOLUTION INTRAMUSCULAR; INTRAVENOUS; SUBCUTANEOUS AS NEEDED
Status: DISCONTINUED | OUTPATIENT
Start: 2024-09-05 | End: 2024-09-05

## 2024-09-05 RX ORDER — PROPOFOL 10 MG/ML
INJECTION, EMULSION INTRAVENOUS AS NEEDED
Status: DISCONTINUED | OUTPATIENT
Start: 2024-09-05 | End: 2024-09-05

## 2024-09-05 RX ORDER — ESMOLOL HYDROCHLORIDE 10 MG/ML
INJECTION INTRAVENOUS AS NEEDED
Status: DISCONTINUED | OUTPATIENT
Start: 2024-09-05 | End: 2024-09-05

## 2024-09-05 RX ORDER — HYDRALAZINE HYDROCHLORIDE 20 MG/ML
10 INJECTION INTRAMUSCULAR; INTRAVENOUS EVERY 4 HOURS PRN
Status: DISCONTINUED | OUTPATIENT
Start: 2024-09-05 | End: 2024-09-06 | Stop reason: HOSPADM

## 2024-09-05 ASSESSMENT — PAIN - FUNCTIONAL ASSESSMENT
PAIN_FUNCTIONAL_ASSESSMENT: 0-10
PAIN_FUNCTIONAL_ASSESSMENT: UNABLE TO SELF-REPORT
PAIN_FUNCTIONAL_ASSESSMENT: 0-10
PAIN_FUNCTIONAL_ASSESSMENT: UNABLE TO SELF-REPORT
PAIN_FUNCTIONAL_ASSESSMENT: 0-10
PAIN_FUNCTIONAL_ASSESSMENT: 0-10
PAIN_FUNCTIONAL_ASSESSMENT: UNABLE TO SELF-REPORT
PAIN_FUNCTIONAL_ASSESSMENT: 0-10
PAIN_FUNCTIONAL_ASSESSMENT: UNABLE TO SELF-REPORT
PAIN_FUNCTIONAL_ASSESSMENT: 0-10
PAIN_FUNCTIONAL_ASSESSMENT: 0-10

## 2024-09-05 ASSESSMENT — PAIN SCALES - GENERAL
PAINLEVEL_OUTOF10: 5 - MODERATE PAIN
PAINLEVEL_OUTOF10: 6
PAINLEVEL_OUTOF10: 4
PAINLEVEL_OUTOF10: 6
PAINLEVEL_OUTOF10: 4
PAINLEVEL_OUTOF10: 6
PAINLEVEL_OUTOF10: 6
PAINLEVEL_OUTOF10: 5 - MODERATE PAIN
PAINLEVEL_OUTOF10: 6
PAINLEVEL_OUTOF10: 6

## 2024-09-05 ASSESSMENT — COLUMBIA-SUICIDE SEVERITY RATING SCALE - C-SSRS
2. HAVE YOU ACTUALLY HAD ANY THOUGHTS OF KILLING YOURSELF?: NO
1. IN THE PAST MONTH, HAVE YOU WISHED YOU WERE DEAD OR WISHED YOU COULD GO TO SLEEP AND NOT WAKE UP?: NO
6. HAVE YOU EVER DONE ANYTHING, STARTED TO DO ANYTHING, OR PREPARED TO DO ANYTHING TO END YOUR LIFE?: NO

## 2024-09-05 NOTE — ANESTHESIA PROCEDURE NOTES
Arterial Line:    Date/Time: 9/5/2024 10:43 AM    Staffing  Performed: attending, CAA and GRACIE   Authorized by: Agustina Woodward MD    Performed by: TREVER George    An arterial line was placed. in the OR for the following indication(s): continuous blood pressure monitoring and blood sampling needed.    A 20 gauge (size), 1 inch (length), Angiocath (type) catheter was placed into the Left radial artery, secured by Tegaderm,   Seldinger technique used.  Events:  patient tolerated procedure well with no complications.

## 2024-09-05 NOTE — ANESTHESIA PROCEDURE NOTES
Peripheral IV  Date/Time: 9/5/2024 10:25 AM      Placement  Needle size: 16 G  Laterality: right  Location: forearm  Site prep: alcohol  Technique: anatomical landmarks  Attempts: 1

## 2024-09-05 NOTE — PERIOPERATIVE NURSING NOTE
Report has been called to [facility] on LT7 stepdown.   Complete Neurovascular Assessment is on the Pacu Flowsheet.  Dr. Traore stated patient can go to room when 4 hour pacu stay is done.

## 2024-09-05 NOTE — BRIEF OP NOTE
Date: 2024  OR Location: [facility] OR    Name: Mare Ching, : 1950, Age: 73 y.o., MRN: [id number], Sex: female    Diagnosis  Pre-op Diagnosis      * Carotid stenosis, asymptomatic, right [I65.21] Post-op Diagnosis     * Carotid stenosis, asymptomatic, right [I65.21]     Procedures  Endarterectomy Carotid Artery  84405 - ME TEAEC W/PATCH GRF CAROTID VERTB SUBCLAV NECK INC      Surgeons      * Chris Leonard - Primary    Resident/Fellow/Other Assistant:  Surgeons and Role:     * Mook Bernal MD - Fellow    Procedure Summary  Anesthesia: General  ASA: III  Anesthesia Staff: Anesthesiologist: Agustina Woodward MD  C-AA: TREVER George  Estimated Blood Loss: 20 mL  Intra-op Medications: Administrations occurring from 1010 to 1415 on 24:  * No intraprocedure medications in log *           Anesthesia Record               Intraprocedure I/O Totals          Intake    Phenylephrine Drip 0.00 mL    The total shown is the total volume documented since Anesthesia Start was filed.    Total Intake 0 mL          Specimen:   ID Type Source Tests Collected by Time   1 : RIGHT CAROTID PLAQUE Tissue PLAQUE SURGICAL PATHOLOGY EXAM Chris Leonard MD PhD 2024 [id number]        Staff:   Circulator: Gamal Lockwood Person: Dimple Montenegroub Person: Nara  Circulator: Elle          Findings: successful eversion right endarterectomy    Complications:  None; patient tolerated the procedure well.     Disposition: PACU - hemodynamically stable.  Condition: stable  Specimens Collected:   ID Type Source Tests Collected by Time   1 : RIGHT CAROTID PLAQUE Tissue PLAQUE SURGICAL PATHOLOGY EXAM Chris Leonard MD PhD 2024 1219

## 2024-09-05 NOTE — ANESTHESIA PREPROCEDURE EVALUATION
Patient: Mare Ching    Procedure Information       Date/Time: 09/05/24 1010    Procedure: Endarterectomy Carotid Artery (Right)    Location: [facility] OR 16 / Virtual [facility] OR    Surgeons: Crhis Leonard MD PhD        ALLERGIES:  Allergies   Allergen Reactions    Adhesive Tape-Silicones Other     blisters        MEDICAL HISTORY:  Past Medical History:   Diagnosis Date    Abdominal bloating 08/03/2023    Acute bacterial bronchitis 08/03/2023    Acute UTI 08/03/2023    Angina pectoris (CMS-HCC)     resolved per cardiology last note    Body mass index (BMI) 25.0-25.9, adult     BMI 25.0-25.9,adult    Body mass index (BMI) 26.0-26.9, adult     BMI 26.0-26.9,adult    Candidal vaginitis 08/03/2023    Carotid artery disease (CMS-HCC)     B/L Stenosis    Chest pain, atypical 08/03/2023    Chest pain, midsternal 08/03/2023    Chronic obstructive pulmonary disease, unspecified (Multi) 07/15/2021    COPD (chronic obstructive pulmonary disease)    Coronary artery disease     Delayed emergence from general anesthesia     Dysuria 08/03/2023    Flank pain 08/03/2023    GERD (gastroesophageal reflux disease)     Hip pain, right 08/03/2023    History of substance dependence (Multi) 04/04/2024    Hyperlipidemia, unspecified 10/03/2022    Hyperlipidemia    Hypertension     Hypothyroidism     Increased urinary frequency 08/03/2023    Other specified postprocedural states 04/29/2015    H/O colonoscopy    Personal history of other diseases of the respiratory system     History of asthma    PONV (postoperative nausea and vomiting)     Proteinuria 08/03/2023    Recurrent urinary tract infection 08/03/2023    Sialoadenitis 08/03/2023    SOB (shortness of breath) 08/03/2023    Type 2 diabetes mellitus without complications (Multi) 10/11/2022    Diabetes mellitus    Urinary urgency 08/03/2023        Relevant Problems   Anesthesia (within normal limits)      Cardiac   (+) CAD in native artery   (+) Essential hypertension   (+)  Hyperlipidemia   (+) Type 2 diabetes mellitus with diabetic peripheral angiopathy without gangrene, unspecified whether long term insulin use (Multi)      Pulmonary   (+) COPD with acute exacerbation (Multi)      Neuro   (+) Anxiety   (+) Bilateral carotid artery stenosis   (+) Carotid stenosis, asymptomatic, right   (+) Lumbar radiculopathy      /Renal   (+) Hydronephrosis      Endocrine   (+) Hypothyroidism   (+) Type 2 diabetes mellitus without complication, without long-term current use of insulin (Multi)      ID   (+) Herpes labialis        SURGICAL HISTORY:  Past Surgical History:   Procedure Laterality Date    CARDIAC CATHETERIZATION N/A 10/23/2023    Procedure: Left Heart Cath, With LV ([id number]);  Surgeon: Manpreet Garg MD;  Location: [facility] Cardiac Cath Lab;  Service: Cardiovascular;  Laterality: N/A;     SECTION, CLASSIC      x2    CORONARY ANGIOPLASTY WITH STENT PLACEMENT      LAD    HYSTERECTOMY  2015    Hysterectomy    SALIVARY GLAND SURGERY Left     left parotidectomy    TUBAL LIGATION Bilateral         MEDICATIONS:  Current Outpatient Medications   Medication Instructions    amLODIPine (NORVASC) 5 mg, oral, Daily    blood sugar diagnostic (Accu-Chek Guide test strips) strip USE TO TEST BLOOD SUGAR 3 TIMES DAILY.    chlorhexidine (Peridex) 0.12 % solution Swish and spit 15 mL night before surgery and morning of surgery    clopidogrel (PLAVIX) 75 mg, oral, Daily    empagliflozin (JARDIANCE) 25 mg, oral, Daily    ezetimibe (ZETIA) 10 mg, oral, Daily    lancets misc miscellaneous, Use 3 times daily as directed    levothyroxine (SYNTHROID, LEVOXYL) 100 mcg, oral, Daily before breakfast    metoprolol succinate XL (Toprol-XL) 25 mg 24 hr tablet TAKE ONE TABLET BY MOUTH ONCE DAILY. DO NOT CRUSH OR CHEW.    rosuvastatin (CRESTOR) 40 mg, oral, Daily    semaglutide 0.25 mg, subcutaneous, Once Weekly    Spiriva Respimat 2.5 mcg/actuation inhaler 1 puff, inhalation, 2 times daily    Symbicort  "160-4.5 mcg/actuation inhaler 2 puffs, inhalation, 2 times daily RT    Ventolin HFA 90 mcg/actuation inhaler Inhales as needed        VITALS:      8/28/2024    10:19 AM 8/26/2024     8:46 AM 8/26/2024     8:45 AM   Vitals   Systolic 129 149 151   Diastolic 68 61 73   Heart Rate 69  67   Temp 36.9 °C (98.4 °F)     Height (in) 1.702 m (5' 7\")  1.702 m (5' 7\")   Weight (lb) 155.1  155.8   BMI 24.29 kg/m2  24.4 kg/m2   BSA (m2) 1.82 m2  1.83 m2   Visit Report  Report Report       LABS:   BMP   Lab Results   Component Value Date    GLUCOSE 114 (H) 08/28/2024    CALCIUM 10.0 08/28/2024     08/28/2024    K 4.7 08/28/2024    CO2 24 08/28/2024     08/28/2024    BUN 12 08/28/2024    CREATININE 0.57 08/28/2024   , LFT   Lab Results   Component Value Date    ALT 26 07/10/2024    AST 19 07/10/2024    ALKPHOS 46 07/10/2024    BILITOT 0.6 07/10/2024   , CBC  Lab Results   Component Value Date    WBC 6.9 08/28/2024    HGB 14.3 08/28/2024    HCT 43.7 08/28/2024    MCV 87 08/28/2024     08/28/2024   , Coags   Lab Results   Component Value Date/Time    PROTIME 11.3 08/28/2024 1038    INR 1.0 08/28/2024 1038    APTT 27 08/28/2024 1038    , A1C   Lab Results   Component Value Date    HGBA1C 7.0 (H) 08/30/2024       IMAGES:  EKG          Encounter Date: 07/17/24   ECG 12 Lead   Result Value    Ventricular Rate 68    Atrial Rate 68    IA Interval 196    QRS Duration 156    QT Interval 472    QTC Calculation(Bazett) 501    P Axis 50    R Axis -83    T Axis 30    QRS Count 11    Q Onset 208    P Onset 110    P Offset 158    T Offset 444    QTC Fredericia 492    Narrative    Normal sinus rhythm  Left axis deviation  Right bundle branch block  Possible Septal infarct , age undetermined  Abnormal ECG  Confirmed by Chris Torres ([id number]) on 8/2/2024 6:21:37 PM      , ECHO         Echocardiogram     Narrative  [facility], [address] [place] [address], Formerly [facility], [facility] [address]Boy [phone] and  Fax [phone]    TRANSTHORACIC " "ECHOCARDIOGRAM REPORT      Patient Name:     ERIN RESENDIZ Reading Physician:   [id number] Irvin Fan MD, [facility]  Study Date:       4/6/2023            Referring Physician: IRVIN FAN  MRN/PID:          [id number]            PCP:                 Griffin Baltazar DO  Accession/Order#: [id number]        Department Location: [facility]  YOB: 1950           Fellow:  Gender:           F                   Nurse:  Admit Date:       4/6/2023            Sonographer:         Elli Nazario \"RDCS,  RCS\"  Admission Status: Outpatient          Additional Staff:  Height:           170.18 cm           CC Report to:  Weight:           73.48 kg            Study Type:          Echocardiogram  BSA:              1.85 m2  Blood Pressure: 128 /60 mmHg    Diagnosis/ICD: R06.02-Shortness of breath  Indication:    Dyspnea on Exertion  Procedure/CPT: Echo Complete w Full Doppler-[id number]  Study Detail: The following Echo studies were performed: 2D, M-Mode, Doppler and  color flow.      PHYSICIAN INTERPRETATION:  Left Ventricle: The left ventricular systolic function is normal, with an estimated ejection fraction of 60-65%. There are no regional wall motion abnormalities. The left ventricular cavity size is normal. Spectral Doppler shows a normal pattern of left ventricular diastolic filling.  Left Atrium: The left atrium is normal in size.  Right Ventricle: The right ventricle is normal in size. There is normal right ventricular global systolic function.  Right Atrium: The right atrium is normal in size.  Aortic Valve: The aortic valve appears structurally normal. There is no evidence of aortic valve regurgitation. The peak instantaneous gradient of the aortic valve is 6.5 mmHg. The mean gradient of the aortic valve is 3.5 mmHg.  Mitral Valve: The mitral valve is normal in structure. There is mild mitral valve regurgitation.  Tricuspid Valve: The tricuspid valve is structurally normal. There is trace tricuspid " regurgitation.  Pulmonic Valve: The pulmonic valve is structurally normal. There is physiologic pulmonic valve regurgitation.  Pericardium: There is no pericardial effusion noted.  Aorta: The aortic root is normal.  Systemic Veins: The inferior vena cava appears mildly dilated.      CONCLUSIONS:  1. Left ventricular systolic function is normal with a 60-65% estimated ejection fraction.  2. Mild mitral regurgitation.    QUANTITATIVE DATA SUMMARY:  2D MEASUREMENTS:  Normal Ranges:  LAs:           3.63 cm   (2.7-4.0cm)  RVIDd:         2.83 cm   (0.9-3.6cm)  IVSd:          0.98 cm   (0.6-1.1cm)  LVPWd:         0.96 cm   (0.6-1.1cm)  LVIDd:         4.64 cm   (3.9-5.9cm)  LVIDs:         3.09 cm  LV Mass Index: 83.7 g/m2  LV % FS        33.4 %    LA VOLUME:  Normal Ranges:  LA Area A4C:     15.2 cm2  LA Area A2C:     15.6 cm2  LA Volume Index: 21.7 ml/m2  LA Vol A4C:      34.7 ml  LA Vol A2C:      40.1 ml    RA VOLUME BY A/L METHOD:  Normal Ranges:  RA Area A4C: 12.1 cm2    M-MODE MEASUREMENTS:  Normal Ranges:  Ao Root: 3.20 cm (2.0-3.7cm)  AoV Exc: 1.53 cm (1.5-2.5cm)    AORTA MEASUREMENTS:  Normal Ranges:  AoV Exc: 1.53 cm (1.5-2.5cm)    LV SYSTOLIC FUNCTION BY 2D PLANIMETRY (MOD):  Normal Ranges:  EF-A4C View: 61.9 % (>=55%)  EF-A2C View: 62.3 %  EF-Biplane:  62.9 %    LV DIASTOLIC FUNCTION:  Normal Ranges:  MV Peak E:    0.99 m/s (0.7-1.2 m/s)  MV Peak A:    1.06 m/s (0.42-0.7 m/s)  E/A Ratio:    0.93     (1.0-2.2)  MV lateral e' 0.06 m/s  MV medial e'  0.06 m/s    MITRAL VALVE:  Normal Ranges:  MV DT: 259 msec (150-240msec)    AORTIC VALVE:  Normal Ranges:  AoV Vmax:                1.27 m/s (<=1.7m/s)  AoV Peak P.5 mmHg (<20mmHg)  AoV Mean PG:             3.5 mmHg (1.7-11.5mmHg)  LVOT Max Dru:            0.89 m/s (<=1.1m/s)  AoV VTI:                 31.49 cm (18-25cm)  LVOT VTI:                21.32 cm  LVOT Diameter:           1.99 cm  (1.8-2.4cm)  AoV Area, VTI:           2.11 cm2 (2.5-5.5cm2)  AoV  Area,Vmax:           2.19 cm2 (2.5-4.5cm2)  AoV Dimensionless Index: 0.68    RIGHT VENTRICLE:  RV 1   3.1 cm  RV 2   2.9 cm  RV 3   6.0 cm  TAPSE: 22.0 mm  RV s'  0.12 m/s    AORTA:  Asc Ao Diam 2.99 cm    , CARDIAC CATH        Left Heart Cath, With LV 10/23/2023    [facility], Cath Lab, [address] [place] [address]    Cardiovascular Catheterization Report    Patient Name:      ERIN RESENDIZ  Performing Physician:  [id number] Garg MD  Study Date:        10/23/2023           Verifying Physician:   [id number] Garg MD  MRN/PID:           [id number]             Cardiologist/Co-scrub:  Accession#:        [id number]         Ordering Provider:     [id number] DOROTHY GARBER  Date of Birth/Age: 1950 / 73 years Fellow:  Gender:            F                    Fellow:  Encounter#:        [id number]      Study: Left Heart Cath      Indications:  ERIN RESENDIZ is a 73 year old female who presents with coronary artery disease, dyslipidemia, hypertension, prior percutaneous coronary intervention and a chest pain assessment of typical angina. Worsening angina.    Appropriate Use Criteria:  Unstable angina with intermediate risk score; AUC score = 8.  Medical History:  Stress test performed: No. CTA performed: NoОльга Gonzalez accessed: No. LVEF  Assessed: No.  Cardiac arrest: No.  Cardiac surgical consult: No.  Cardiovascular instability: No.  Frailty status of patient entering lab: 4 = Vulnerable.      Procedure Description:  After infiltration with 2% Lidocaine, the right radial artery was cannulated with a modified Seldinger technique. Subsequently a 6 Haitian sheath was placed retrograde in the right radial artery. Selective coronary catheterization was performed using a 5 Fr catheter(s) exchanged over a guide wire to cannulate the coronary arteries.  Additional catheter(s) used to visualize the coronary arteries were: Tiger. Multiple injections of contrast were made into the  left and right coronary arteries with angiograms recorded in multiple projections. After completion of the procedure, the arterial sheath was pulled and a TR Band Radial Compression Device was utilized to obtain patent hemostasis.    Coronary Angiography:  The coronary circulation is right dominant.    Coronary Angiography Comments:  Contains mild luminal irregularities.    Left anterior descending artery contains a 50 to 60% stenosis ostially. Is a patent stent in its proximal to midportion. There is a 30 to 40% stenosis distally. The diagonal artery contains mild luminal irregularities.    Left circumflex artery, first and second obtuse marginal arteries contain mild luminal irregularities.    The right coronary artery contains a 30% stenosis in its mid to distal portion. The posterior descending artery posterolateral branches contain mild luminal irregularities.        Left Ventriculography:  EDP 29, ejection fraction 55%.    Coronary Interventions:  Angiography reveals a 60% stenosis of the ostial left anterior descending coronary artery. Pre-intervention ANTHONY flow was 3. Percutaneous coronary intervention was performed within the ostial left anterior descending. The stenosis was successfully reduced from 60% to 50%. Post-intervention ANTHONY flow was 3. Given the patient's symptoms and findings, the decision was made to proceed with fractional flow reserve of the LAD. Patient was given additional heparin.    A 6 Urdu EBU 3.5 guiding catheter is used. Intracoronary nitroglycerin was given. An Abbott pressure wire X was zeroed and equalized in the normal fashion. This was advanced into the mid LAD. RFR was 0.89. A pullback was performed. In the distal portion of the stent, RFR was 0.92, in the proximal portion of stent, RFR was 0.95, in the left main, RFR was 0.98.    This suggests that there is a gradual pressure decrement across the entire LAD including the ostium, proximal and mid LAD.    Final angiography  demonstrated a similar finding of the LAD. A TR band was used to achieve hemostasis of the right radial artery. Patient tolerated procedure without difficulty.    Hemo Personnel:  +------------------------+-------------+  Name                    Duty           +------------------------+-------------+  Manpreet Garg MD        PROC MD 1  +------------------------+-------------+  Ranjan Robert RTR       PROC SCRUB 1  +------------------------+-------------+  Nickie Lawson RN  PROC CIRC 1  +------------------------+-------------+  Lamar Ocampo RN     PROC RECORD 1  +------------------------+-------------+  Britt Hinojosa RT      PROC RECORD 2  +------------------------+-------------+      Hemodynamic Pressures:    +----+---------------+----------+-------------+--------------+-------+---------+  Site   Date Time   Phase NameSystolic mmHgDiastolic mmHgED mmHgMean mmHg  +----+---------------+----------+-------------+--------------+-------+---------+    AO     10/23/2023   O2 REST          148            65              96           9:05:52 AM                                                       +----+---------------+----------+-------------+--------------+-------+---------+    LV     10/23/2023   O2 REST          153            17     29                    9:13:07 AM                                                       +----+---------------+----------+-------------+--------------+-------+---------+    LV     10/23/2023   O2 REST          154            16     29                    9:13:14 AM                                                       +----+---------------+----------+-------------+--------------+-------+---------+   LVp     10/23/2023   O2 REST          151            16     29                    9:13:46 AM                                                        +----+---------------+----------+-------------+--------------+-------+---------+   AOp     10/23/2023   O2 REST          162            73             109           9:13:52 AM                                                       +----+---------------+----------+-------------+--------------+-------+---------+      Complications:  No in-lab complications observed.    Cardiac Cath Post Procedure Notes:  Post Procedure Diagnosis: CAD.  Blood Loss:               Estimated blood loss during the procedure was < 20  mls.  Specimens Removed:        Number of specimen(s) removed: none.      Recommendations:  Maximize medical therapy.  Lipid lowering agent or Statin therapy.  Would advocate intensification medical therapy to improve symptoms.  If the patient remains symptomatic despite medical therapy, consideration can be  given to mid and ostial LAD stent implantation, including the left main coronary  artery versus minimally invasive LIMA to the LAD.  Further recommendations per Dr. Fan.    ____________________________________________________________________________________  CONCLUSIONS:  1. Moderate ostial and mild mid LAD disease with an RFR of 0.89.  2. Patent proximal to mid LAD stent.  3. Mild circumflex and RCA disease.  4. Elevated left heart filling pressures.  5. Normal left ventricular systolic function.    , CT Head/Neck       CT angio neck 08/19/2024    Narrative  Interpreted By:  Yaneth Lainez,  STUDY:  CT ANGIO NECK;  8/19/2024 2:04 pm    INDICATION:  Signs/Symptoms:Carotid stenosis.    COMPARISON:  [facility] 05/01/2024    ACCESSION NUMBER(S):  [id number]    ORDERING CLINICIAN:  ELDER DAI    TECHNIQUE:  90 mL Omnipaque 350 was administered intravenously and axial images  of the neck were acquired.  Coronal, sagittal, and 3-D  reconstructions were provided for review.    FINDINGS:      CTA HEAD FINDINGS:    Anterior circulation: The bilateral intracranial internal carotid  arteries,  bilateral carotid terminals, bilateral proximal anterior  and middle cerebral arteries are normal.    Posterior circulation: Bilateral intracranial vertebral arteries,  vertebrobasilar junction, basilar artery and proximal posterior  cerebral arteries are normal.    CTA NECK FINDINGS:    Atherosclerotic calcification along the aortic arch and origins of  the great vessels without significant stenosis.    Right carotid vessels: The common carotid artery is patent. There is  focal severe greater than 70% stenosis at the origin of the right  cervical internal carotid artery with calcified and noncalcified  plaque. Distal to the focal stenosis the cervical internal carotid  artery is patent without significant stenosis.    Left carotid vessels: The common carotid artery is patent. There is  predominantly calcified plaque at the carotid bifurcation with 60%  stenosis by NASCET criteria. The internal carotid artery in the neck  is patent without significant stenosis    Vertebral vessels:  The visualized segments of the cervical vertebral  arteries are patent. The left cervical vertebral artery is dominant.    The left parotid gland is not visualized and may be surgically  absent. Emphysematous changes within the lung apices. Mild  degenerative changes of the cervical spine    Impression  Focal severe greater than 70% stenosis at the origin of the right  cervical internal carotid artery with calcified and noncalcified  plaque.    Predominantly calcified plaque at the left carotid bifurcation with  60% stenosis by NASCET criteria.      SOCIAL:  Social History     Tobacco Use   Smoking Status Former    Current packs/day: 0.00    Average packs/day: 2.0 packs/day for 30.0 years (60.0 ttl pk-yrs)    Types: Cigarettes    Start date:     Quit date: 2000    Years since quittin.6   Smokeless Tobacco Never      Social History     Substance and Sexual Activity   Alcohol Use Yes    Comment: rarely      Social History      Substance and Sexual Activity   Drug Use Never        NPO STATUS:  No data recorded    Clinical Areas Reviewed:     Meds               Anesthesia Assessment:    Physical Exam    Airway  Mallampati: III  TM distance: <3 FB  Neck ROM: full     Cardiovascular - normal exam     Dental - normal exam     Pulmonary - normal exam     Abdominal - normal exam             Anesthesia Plan    History of general anesthesia?: yes  History of complications of general anesthesia?: no    ASA 3     general     intravenous induction   Postoperative administration of opioids is intended.  Anesthetic plan and risks discussed with patient.  Use of blood products discussed with patient who consented to blood products.    Plan discussed with CAA and attending.

## 2024-09-05 NOTE — PERIOPERATIVE NURSING NOTE
1530 - Dr. Vasquez and Dr. Licea at the bedside to assess A_line since such over a 30 point difference between the cuff and a-line. Both agreed the A-line was overshooting and the only way to double check pressure was a manual pressure. Manual pressure was 128/50. Dr. Traore and Dr. Stanley at the beside to assess patient and aware of discussion of A-line . Dr. Stanley stated to go by cuff pressure since more accurate.

## 2024-09-05 NOTE — SIGNIFICANT EVENT
"         VASCULAR SURGERY SERVICE  Post Operative Evaluation   Today's Date: 24                  Patient: Mare Ching  Admitted: 2024   : 1950 Length of Stay: Day 0   MRN: [id number] Attending: Dr. Leonard     PREOPERATIVE DIAGNOSIS:   Asymptomatic high grade right carotid artery stenosis.     Procedure   Procedure: Right eversion carotid endarterectomy  Date of procedure: 24    Subjective:      No acute complaints. Patient is doing well, currently with postoperative pain controlled on current regimen. Patient is conversing and behaving appropriately. Denies nausea, vomiting, chest pain, shortness of breath.     Objective     Last Recorded Vitals  Heart Rate:  [68-82]   Temp:  [36 °C (96.8 °F)-36.4 °C (97.5 °F)]   Resp:  [11-18]   BP: (121-173)/(50-79)   Height:  [170.2 cm (5' 7\")]   Weight:  [70.1 kg (154 lb 8.7 oz)]   SpO2:  [93 %-100 %]     Intake/Output last 3 Shifts:  No intake/output data recorded.    Physical Exam:  Vascular Physical Exam  Constitutional: No acute distress, resting in bed   Neuro:  AOx3, No cranial nerve deficits   ENMT: moist mucous membranes  Head: atraumatic  Neck: right neck incision closed with sutures and dermabond  CV: no tachycardia  Pulm: non-labored on room air  GI: soft, non-tender, non-distended  Skin: warm and dry.  Musculoskeletal: moving all extremities  Pulses:   Radial: R: palpable. L: palpable.     Active Medications  Scheduled medications     Continuous medications  lactated Ringer's, 20 mL/hr  lactated Ringer's, 100 mL/hr, Last Rate: 100 mL/hr (24 1301)      PRN medications  PRN medications: albuterol, diphenhydrAMINE, droperidol, hydrALAZINE, HYDROmorphone, HYDROmorphone, labetaloL, meperidine, ondansetron, oxyCODONE, oxyCODONE, oxygen    Assessment     Mare Ching is a 73 y.o. year old female now POD#0 s/p Right eversion carotid endarterectomy. Patient is doing well at this time postoperatively with stable VS and adequate pain " control.     Plan     Keep arterial line for blood pressure monitoring  Goal blood pressure 100-140 systolic PAGE VASCULAR SURGERY OR CALL if outside these parameters  pain control  Plavix tomorrow   diet as tolerated  remove gabriel at midnight  24 hours Ancef for periop antibiotics  PT tomorrow morning  SQH for DVT Ppx    Fay Traore M.D.  [facility] n i [facility]  [facility]  Vascular Surgery Resident  PGY-1  4:37 PM 09/05/24  Available via Epic Secure Chat  Service Pager: [phone]

## 2024-09-05 NOTE — SIGNIFICANT EVENT
Vascular Surgery Post-Operative Plan    S/p right eversion carotid endarterectomy    Pre-op Vascular Exam: CN intact, chronic left facial paresthesia at corner of mouth; no motor or sensory deficits  Post-op Vascular Exam: same    Plan:  4 hours PACU stay for NV checks  To stepdown WITH arterial line for blood pressure monitoring  Goal blood pressure 100-140 systolic PAGE OR CALL if outside these parameters  pain control  Plavix tomorrow   diet as tolerated after 4 hours  remove gabriel at midnight  24 hours Ancef for periop antibiotics  PT tomorrow morning  SQH for DVT Ppx  Wound: right neck incision closed with sutures and dermabond    Mook Bernal MD  Vascular Surgery Fellow  Team Pager [phone]  09/05/24  12:25 PM

## 2024-09-05 NOTE — ANESTHESIA PROCEDURE NOTES
Airway  Date/Time: 9/5/2024 10:34 AM  Urgency: elective    Airway not difficult    Staffing  Performed: TREVER, elmira and GRACIE   Authorized by: Agustina Woodward MD    Performed by: TREVER George  Patient location during procedure: OR    Indications and Patient Condition  Indications for airway management: anesthesia and airway protection  Spontaneous ventilation: present  Sedation level: deep  Preoxygenated: yes  Patient position: sniffing  Mask difficulty assessment: 1 - vent by mask  No planned trial extubation    Final Airway Details  Final airway type: endotracheal airway      Successful airway: ETT  Cuffed: yes   Successful intubation technique: direct laryngoscopy  Facilitating devices/methods: intubating stylet  Blade: Angus  Blade size: #3  ETT size (mm): 7.0  Cormack-Lehane Classification: grade IIa - partial view of glottis  Placement verified by: chest auscultation and capnometry   Measured from: teeth  ETT to teeth (cm): 21  Number of attempts at approach: 1    Additional Comments  Intubation performed by MSA student under supervision of Attending and AA

## 2024-09-05 NOTE — OP NOTE
Operative Date: 9/5/2024     PREOPERATIVE DIAGNOSIS:   Asymptomatic high grade right carotid artery stenosis.      POSTOPERATIVE DIAGNOSIS:   Same.     PROCEDURE PERFORMED:  Right eversion carotid endarterectomy  Intraoperative EEG/SSEP monitoring.      SURGEON: Chris Leonard MD, PhD.      ASSISTANT: Mook Bernal MD.      ANESTHESIA:  General      FINDINGS:  - No concerning EEG/SSEP changes during the case.  - No focal neurological deficits at the completion of the case.     Complications: None     EBL (ml): 20 mL     IVF (ml): Per anesthesia report        INDICATION:  Mare Ching is 73 y.o. female with history of R>L Asx. ICA stenosis.   Right ICA with severe stenosis of the R ICA (nearly occlusive).   I discussed the image findings and discussed carotid revascularization vs best medical therapy alone. We also discussed natural history of carotid stenosis.   After discussion, patient wished to proceed with right carotid endarterectomy.   I discussed the plan for right carotid endarterectomy (CEA). We reviewed the options for care, including medical management alone or carotid artery stenting. CEA was recommended to reduce the risk of future stroke, but it was explained that there is no expected change in current symptoms or condition. The typical recovery was discussed. The potential risks were reviewed, including stroke, myocardial infarction, death, cranial injury, bleeding, infection, recurrent stenosis, hyper- or hypotension, dysrhythmia, or other serious complication. The patient indicated understanding of the procedure, plan, alternatives, and risks, and voiced consent to proceed.         PROCEDURE IN DETAIL:  The patient was brought into the Operating Room and placed in a supine position.  Appropriate surgical pause was taken to confirm the patient, using two patient identifiers, the site of the procedure, and the procedure to be performed. General anesthesia was induced. An arterial line was placed  by the anesthesia team.  EEG and SSEP monitoring were set up and used throughout the case to monitor the patient's neurological function. Ultrasound was used to identify and gabriele the site of the right carotid bifurcation. Patient's right neck was then prepared and draped in the usual sterile fashion.       First, a longitudinal skin incision was made overlying the anterior border of the right sternocleidomastoid.  The incision was deepened through the platysma using bovie electrocautery. We then continued our dissection along the medial border of the sternocleidomastoid, allowing this to be retracted laterally.  Deep to the sternocleidomastoid, we identified the internal jugular vein and this was dissected free along the medial border until the facial vein was encountered.  The facial vein was then transected and suture ligated using 3-0 silk suture.     Next, the right common carotid, internal carotid, external carotid, and superior thyroid arteries were identified, dissected free from surrounding tissue, and encircled with Vesseloops.  The vagus nerve was identified and preserved from harm.  The hypoglossal nerve was identified and preserved. 100 units/kg of heparin was administered intravenously.  After after an appropriate period of time to allow circulation of the heparin, and activated clotting time was checked and noted to be therapeutic.  Throughout the case, activated clotting time was checked periodically to ensure that the patient remained therapeutically anticoagulated.     The right internal carotid artery was clamped followed by the common carotid artery, and then the external carotid artery.  No SSEP or EEG changes were noted.  The right internal carotid artery was transected from the right distal common carotid artery  The eversion right internal carotid endarterectomy was performed in a standard fashion.  Eversion endarterectomy of the external carotid artery was performed and the carotid plaque  removed.  Right common carotid artery endarterectomy was performed with Holland. The endarterectomized surface was irrigated with heparinized saline solution and all free debris was removed using forceps.     The proximal distal endarterectomy endpoint was inspected and was satisfactory. The right ICA was bevelled and end-to-end anastomosis between the right ICA and R CCA was performed with running 6-0 Prolene suture.  Prior to  completing the anastomosis, the carotid artery appropriately flushed and irrigated.  Flow was first re-established  into the external carotid artery followed by the internal carotid  artery.  Next, the suture line was checked for hemostasis and satisfactory.       Intraoperative doppler examination was then performed and noted to be satisfactory with resolution of increased velocity ratio.  After ensuring hemostasis, the incision was closed using 2-0 Vicryl suture to reapproximate the platysma.  The skin was then reapproximated with running 4-0 Monocryl suture. The skin was then covered with dermabond.     The patient tolerated the procedure well.  When the patient was awake from general  anesthesia, her neurological exam was at her baseline and moving all extremities.  The patient was taken to the  postoperative area in stable condition.     I was physically present for the entire length of the case and scrubbed for the entire portions.     Chris Leonard MD, PhD.   Clinical   [employer] [employer] [employer]  Co-Director, Aortic Center  [facility] [place]

## 2024-09-05 NOTE — PERIOPERATIVE NURSING NOTE
Dr. Fay Traore from Vascular Service notified that the patient's BP on the cuff ran in the 120's but the A-line was anywhere from 130's to 150. Dr. Traore stated to use A-line for BP and ordered to give 5mg IV hydralazine in pacu for for A-line pressures.

## 2024-09-06 VITALS
BODY MASS INDEX: 24.26 KG/M2 | WEIGHT: 154.54 LBS | HEIGHT: 67 IN | RESPIRATION RATE: 21 BRPM | HEART RATE: 83 BPM | OXYGEN SATURATION: 93 % | DIASTOLIC BLOOD PRESSURE: 51 MMHG | SYSTOLIC BLOOD PRESSURE: 118 MMHG | TEMPERATURE: 98.1 F

## 2024-09-06 DIAGNOSIS — I65.23 BILATERAL CAROTID ARTERY STENOSIS: Primary | ICD-10-CM

## 2024-09-06 PROBLEM — I65.21 CAROTID STENOSIS, ASYMPTOMATIC, RIGHT: Status: RESOLVED | Noted: 2024-08-26 | Resolved: 2024-09-06

## 2024-09-06 LAB
ANION GAP SERPL CALC-SCNC: 12 MMOL/L (ref 10–20)
BUN SERPL-MCNC: 14 MG/DL (ref 6–23)
CALCIUM SERPL-MCNC: 9.6 MG/DL (ref 8.6–10.6)
CHLORIDE SERPL-SCNC: 104 MMOL/L (ref 98–107)
CO2 SERPL-SCNC: 26 MMOL/L (ref 21–32)
CREAT SERPL-MCNC: 0.8 MG/DL (ref 0.5–1.05)
EGFRCR SERPLBLD CKD-EPI 2021: 78 ML/MIN/1.73M*2
ERYTHROCYTE [DISTWIDTH] IN BLOOD BY AUTOMATED COUNT: 14.9 % (ref 11.5–14.5)
GLUCOSE BLD MANUAL STRIP-MCNC: 138 MG/DL (ref 74–99)
GLUCOSE BLD MANUAL STRIP-MCNC: 163 MG/DL (ref 74–99)
GLUCOSE SERPL-MCNC: 147 MG/DL (ref 74–99)
HCT VFR BLD AUTO: 42.5 % (ref 36–46)
HGB BLD-MCNC: 13.3 G/DL (ref 12–16)
MAGNESIUM SERPL-MCNC: 2.15 MG/DL (ref 1.6–2.4)
MCH RBC QN AUTO: 27.6 PG (ref 26–34)
MCHC RBC AUTO-ENTMCNC: 31.3 G/DL (ref 32–36)
MCV RBC AUTO: 88 FL (ref 80–100)
NRBC BLD-RTO: 0 /100 WBCS (ref 0–0)
PHOSPHATE SERPL-MCNC: 2.7 MG/DL (ref 2.5–4.9)
PLATELET # BLD AUTO: 254 X10*3/UL (ref 150–450)
POTASSIUM SERPL-SCNC: 3.7 MMOL/L (ref 3.5–5.3)
RBC # BLD AUTO: 4.82 X10*6/UL (ref 4–5.2)
SODIUM SERPL-SCNC: 138 MMOL/L (ref 136–145)
WBC # BLD AUTO: 11.4 X10*3/UL (ref 4.4–11.3)

## 2024-09-06 PROCEDURE — 82565 ASSAY OF CREATININE: CPT | Performed by: STUDENT IN AN ORGANIZED HEALTH CARE EDUCATION/TRAINING PROGRAM

## 2024-09-06 PROCEDURE — 36415 COLL VENOUS BLD VENIPUNCTURE: CPT | Performed by: STUDENT IN AN ORGANIZED HEALTH CARE EDUCATION/TRAINING PROGRAM

## 2024-09-06 PROCEDURE — 85027 COMPLETE CBC AUTOMATED: CPT | Performed by: STUDENT IN AN ORGANIZED HEALTH CARE EDUCATION/TRAINING PROGRAM

## 2024-09-06 PROCEDURE — 2500000002 HC RX 250 W HCPCS SELF ADMINISTERED DRUGS (ALT 637 FOR MEDICARE OP, ALT 636 FOR OP/ED): Performed by: STUDENT IN AN ORGANIZED HEALTH CARE EDUCATION/TRAINING PROGRAM

## 2024-09-06 PROCEDURE — 94640 AIRWAY INHALATION TREATMENT: CPT

## 2024-09-06 PROCEDURE — 2500000001 HC RX 250 WO HCPCS SELF ADMINISTERED DRUGS (ALT 637 FOR MEDICARE OP): Performed by: STUDENT IN AN ORGANIZED HEALTH CARE EDUCATION/TRAINING PROGRAM

## 2024-09-06 PROCEDURE — 99024 POSTOP FOLLOW-UP VISIT: CPT | Performed by: SURGERY

## 2024-09-06 PROCEDURE — 99024 POSTOP FOLLOW-UP VISIT: CPT | Performed by: NURSE PRACTITIONER

## 2024-09-06 PROCEDURE — 2500000001 HC RX 250 WO HCPCS SELF ADMINISTERED DRUGS (ALT 637 FOR MEDICARE OP)

## 2024-09-06 PROCEDURE — 82947 ASSAY GLUCOSE BLOOD QUANT: CPT

## 2024-09-06 PROCEDURE — 97530 THERAPEUTIC ACTIVITIES: CPT | Mod: GP

## 2024-09-06 PROCEDURE — 84100 ASSAY OF PHOSPHORUS: CPT | Performed by: STUDENT IN AN ORGANIZED HEALTH CARE EDUCATION/TRAINING PROGRAM

## 2024-09-06 PROCEDURE — 2500000004 HC RX 250 GENERAL PHARMACY W/ HCPCS (ALT 636 FOR OP/ED): Performed by: STUDENT IN AN ORGANIZED HEALTH CARE EDUCATION/TRAINING PROGRAM

## 2024-09-06 PROCEDURE — 83735 ASSAY OF MAGNESIUM: CPT | Performed by: STUDENT IN AN ORGANIZED HEALTH CARE EDUCATION/TRAINING PROGRAM

## 2024-09-06 PROCEDURE — 97161 PT EVAL LOW COMPLEX 20 MIN: CPT | Mod: GP

## 2024-09-06 RX ORDER — ACETAMINOPHEN 325 MG/1
650 TABLET ORAL EVERY 6 HOURS PRN
Start: 2024-09-06

## 2024-09-06 RX ORDER — CLOPIDOGREL BISULFATE 75 MG/1
75 TABLET ORAL DAILY
Qty: 90 TABLET | Refills: 3 | Status: SHIPPED | OUTPATIENT
Start: 2024-09-06

## 2024-09-06 RX ORDER — OXYCODONE HYDROCHLORIDE 5 MG/1
5 TABLET ORAL EVERY 6 HOURS PRN
Qty: 12 TABLET | Refills: 0 | Status: SHIPPED | OUTPATIENT
Start: 2024-09-06 | End: 2024-09-09

## 2024-09-06 ASSESSMENT — COGNITIVE AND FUNCTIONAL STATUS - GENERAL
DRESSING REGULAR UPPER BODY CLOTHING: A LITTLE
DAILY ACTIVITIY SCORE: 23
MOBILITY SCORE: 22
DAILY ACTIVITIY SCORE: 24
CLIMB 3 TO 5 STEPS WITH RAILING: A LITTLE
WALKING IN HOSPITAL ROOM: A LITTLE
MOBILITY SCORE: 24
MOBILITY SCORE: 24

## 2024-09-06 ASSESSMENT — PAIN SCALES - GENERAL
PAINLEVEL_OUTOF10: 1
PAINLEVEL_OUTOF10: 0 - NO PAIN
PAINLEVEL_OUTOF10: 0 - NO PAIN

## 2024-09-06 ASSESSMENT — ACTIVITIES OF DAILY LIVING (ADL): ADL_ASSISTANCE: INDEPENDENT

## 2024-09-06 ASSESSMENT — PAIN - FUNCTIONAL ASSESSMENT
PAIN_FUNCTIONAL_ASSESSMENT: 0-10
PAIN_FUNCTIONAL_ASSESSMENT: 0-10

## 2024-09-06 NOTE — CARE PLAN
Problem: Pain - Adult  Goal: Verbalizes/displays adequate comfort level or baseline comfort level  Outcome: Progressing     Problem: Discharge Planning  Goal: Discharge to home or other facility with appropriate resources  Outcome: Progressing     Problem: Chronic Conditions and Co-morbidities  Goal: Patient's chronic conditions and co-morbidity symptoms are monitored and maintained or improved  Outcome: Progressing       The clinical goals for the shift include pt to remain HDS throughout shift

## 2024-09-06 NOTE — PROGRESS NOTES
Physical Therapy    Physical Therapy Evaluation    Patient Name: Mare Ching  MRN: [id number]  Today's Date: 9/6/2024   Time Calculation  Start Time: 1123  Stop Time: 1148  Time Calculation (min): 25 min    Assessment/Plan   PT Assessment  PT Assessment Results:  (No current impairments.)  Rehab Prognosis:  (No current rehab needs.)  Barriers to Discharge: none  Evaluation/Treatment Tolerance: Patient tolerated treatment well  End of Session Communication: Bedside nurse  Assessment Comment: 73 y.o. F s/p caritod endartectomy currently demonstrating steady gait and function. Pt currently without acute PT needs and will not require PT after discharge. Will DC PT at this time.  End of Session Patient Position: Bed, 2 rail up, Alarm off, not on at start of session  IP OR SWING BED PT PLAN  Inpatient or Swing Bed: Inpatient  PT Plan  Treatment/Interventions:  (No treatment interventions.)  PT Plan: PT Eval only  PT Eval Only Reason: No acute PT needs identified  PT Frequency: PT eval only  PT Discharge Recommendations: No further acute PT, No PT needed after discharge  PT Recommended Transfer Status: Independent  PT - OK to Discharge: Yes      Subjective   General Visit Information:  General  Reason for Referral: carotid stenosis  Past Medical History Relevant to Rehab: 73 y.o. 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  Family/Caregiver Present: Yes  Co-Treatment:  (no)  Prior to Session Communication: Bedside nurse  Patient Position Received: Bed, 3 rail up, Alarm off, not on at start of session  Preferred Learning Style: verbal, auditory  General Comment: Pt resting in bed upon entry. Pleasant and cooperative. Willing to participate with PT.  Home Living:  Home Living  Type of Home: House  Lives With:  (Daughter will provide assist as needed.)  Home Adaptive Equipment: Walker rolling  or standard (Pt has a walker from  who has passed. Never required use of a walker at baseline.)  Home Layout:  (No concerns.)  Home Access: No concerns  Prior Level of Function:  Prior Function Per Pt/Caregiver Report  Level of Towns: Independent with ADLs and functional transfers  ADL Assistance: Independent  Homemaking Assistance: Independent  Ambulatory Assistance: Independent  Precautions:  Precautions  Medical Precautions: Fall precautions    Vital Signs (Past 2hrs)        Date/Time Vitals Session Patient Position Pulse Resp SpO2 BP MAP (mmHg)           73     09/06/24 1123 --  --  --  --  93 %  --  --                   Vital Signs Comment: on room air     Objective   Pain:  Pain Assessment  Pain Assessment: 0-10  0-10 (Numeric) Pain Score: 1  Pain Type: Surgical pain  Pain Interventions: Medication (See MAR) (tylenol per pt)  Cognition:  Cognition  Overall Cognitive Status: Within Functional Limits  Orientation Level: Oriented X4  Attention: Within Functional Limits  Insight: Within function limits  Impulsive: Within functional limits  Processing Speed: Within funtional limits    General Assessments:    Activity Tolerance  Endurance: Tolerates 10 - 20 min exercise with multiple rests    Sensation  Light Touch: No apparent deficits    Strength  Strength Comments: Grossly >4+/5 throughout  Strength  Strength Comments: Grossly >4+/5 throughout    Coordination  Movements are Fluid and Coordinated: Yes    Postural Control  Postural Control: Within Functional Limits    Static Sitting Balance  Static Sitting-Balance Support: No upper extremity supported  Static Sitting-Level of Assistance: Independent    Static Standing Balance  Static Standing-Balance Support: No upper extremity supported  Static Standing-Level of Assistance: Close supervision  Functional Assessments:    Bed Mobility  Bed Mobility: Yes  Bed Mobility 1  Bed Mobility 1: Supine to sitting  Level of Assistance 1: Contact guard  Bed  Mobility 2  Bed Mobility  2: Sitting to supine  Level of Assistance 2: Close supervision    Transfers  Transfer: Yes  Transfer 1  Transfer From 1: Sit to, Stand to  Transfer to 1: Stand, Sit  Transfer Level of Assistance 1: Close supervision, Contact guard    Ambulation/Gait Training  Ambulation/Gait Training Performed: Yes  Ambulation/Gait Training 1  Surface 1: Level tile  Device 1: No device  Assistance 1: Close supervision, Contact guard  Quality of Gait 1:  (Decreased step length and zohra though otherwise steady gait without acute LOB. No complaints of dizziness or lightheadedness.)  Comments/Distance (ft) 1: 300ft    Outcome Measures:  [facility] Basic Mobility  Turning from your back to your side while in a flat bed without using bedrails: None  Moving from lying on your back to sitting on the side of a flat bed without using bedrails: None  Moving to and from bed to chair (including a wheelchair): None  Standing up from a chair using your arms (e.g. wheelchair or bedside chair): None  To walk in hospital room: None  Climbing 3-5 steps with railing: None  Basic Mobility - Total Score: 24    Education Documentation  Precautions, taught by Jed Andrade PT at 9/6/2024 12:14 PM.  Learner: Patient  Readiness: Acceptance  Method: Explanation  Response: Verbalizes Understanding    Mobility Training, taught by Jed Andrade PT at 9/6/2024 12:14 PM.  Learner: Patient  Readiness: Acceptance  Method: Explanation  Response: Verbalizes Understanding    Education Comments  No comments found.

## 2024-09-06 NOTE — PROGRESS NOTES
Pharmacy Admission Order Reconciliation Review    Mare Ching is a 73 y.o. female admitted for Carotid stenosis, asymptomatic, right. Pharmacy reviewed the patient's unreconciled admission medications.    Prior to admission medications that were reviewed and acted on by the pharmacist include:  Toprol XL  These medications have been reconciled.     Any other unreconcilied medications have been addressed and will be ordered or held by the patient's medical team. Medications addressed by the pharmacist may be added or changed by the patient's medical team at any time.    Cecilia Jennings, PharmD  Transitions of Care Pharmacist  [facility]  Please reach out via Secure Chat for questions

## 2024-09-06 NOTE — ANESTHESIA POSTPROCEDURE EVALUATION
Patient: Mare Ching    Procedure Summary       Date: 09/05/24 Room / Location: [facility] OR 16 / [facility] OR    Anesthesia Start: 1011 Anesthesia Stop: 1250    Procedure: Endarterectomy Carotid Artery (Right) Diagnosis:       Carotid stenosis, asymptomatic, right      (Carotid stenosis, asymptomatic, right [I65.21])    Surgeons: Chris Leonard MD PhD Responsible Provider: Agustina Woodward MD    Anesthesia Type: general ASA Status: 3            Anesthesia Type: general    Vitals Value Taken Time   /56 09/05/24 1700   Temp 37 °C (98.6 °F) 09/05/24 1645   Pulse 84 09/05/24 1707   Resp 15 09/05/24 1707   SpO2 96 % 09/05/24 1707   Vitals shown include unfiled device data.    Anesthesia Post Evaluation    Patient location during evaluation: PACU  Patient participation: complete - patient participated  Level of consciousness: awake and alert  Pain management: adequate  Airway patency: patent  Cardiovascular status: acceptable  Respiratory status: acceptable  Hydration status: acceptable  Postoperative Nausea and Vomiting: none        No notable events documented.

## 2024-09-06 NOTE — PROGRESS NOTES
VASCULAR SURGERY PROGRESS NOTE  Assessment/Plan   Mare Ching is 73 y.o. 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    Plan:  Remove arterial line  Continue plavix on discharge  Anticipate discharge later today if continues to do well  Follow up with Dr. Leonard in 1 month with carotid duplex    D/w attending, Dr. Aisha Bernal MD  Vascular Surgery Fellow  Team Pager [phone]  09/06/24  9:24 AM      Subjective   No issues overnight. No headaches.     Objective   Vitals:  Heart Rate:  [68-96]   Temp:  [36.3 °C (97.3 °F)-37.2 °C (99 °F)]   Resp:  [11-25]   BP: (121-172)/(47-77)   SpO2:  [92 %-100 %]     Exam:  Constitutional: No acute distress, resting comfortably  Neuro:  AOx3, grossly intact; CN 2-12 intact  ENMT: moist mucous membranes  CV: no tachycardia  Pulm: non-labored on room air  GI: soft, non-tender, non-distended  Skin: warm and dry  Musculoskeletal: moving all extremities  Extremities: warm and well perfused    Labs:

## 2024-09-06 NOTE — DISCHARGE SUMMARY
Discharge Diagnosis  Carotid stenosis, asymptomatic, right    Test Results Pending At Discharge  Pending Labs       Order Current Status    Surgical Pathology Exam In process            Hospital Course   Mare Ching is a 73-year-old female  Hx of DM2, asymptomatic carotid artery stenosis, HTN, COPD, CAD status post PCI with stent, delayed emergence from general anesthesia, hypothyroidism. On 9/5/24 she underwent a right eversion carotid endarterectomy. Post operatively she had stable VS, neurologic exam intact, pain controlled. She was mobile and tolerating diet on POD#1. She was discharged on POD #1 with refill for Plavix, limited Rx for oxycodone.  She will follow up in clinic in 1 month with repeat carotid duplex US.     Pertinent Physical Exam At Time of Discharge  Physical Exam  Constitutional: No acute distress, resting comfortably  Neuro:  AOx3, grossly intact; CN 2-12 intact  ENMT: moist mucous membranes  CV: no tachycardia  Pulm: non-labored on room air  GI: soft, non-tender, non-distended  Skin: warm and dry, right neck surgical wound dry and well approximated with skin glue, no hematoma.  Musculoskeletal: moving all extremities  Extremities: warm and well perfused, palpable radial pulses    Home Medications     Medication List      START taking these medications     acetaminophen 325 mg tablet; Commonly known as: Tylenol; Take 2 tablets   (650 mg) by mouth every 6 hours if needed for mild pain (1 - 3).   oxyCODONE 5 mg immediate release tablet; Commonly known as: Roxicodone;   Take 1 tablet (5 mg) by mouth every 6 hours if needed for severe pain (7 -   10) for up to 3 days.     CONTINUE taking these medications     Accu-Chek Guide test strips strip; Generic drug: blood sugar diagnostic;   USE TO TEST BLOOD SUGAR 3 TIMES DAILY.   amLODIPine 5 mg tablet; Commonly known as: Norvasc; Take 1 tablet (5 mg)   by mouth once daily.   clopidogrel 75 mg tablet; Commonly known as: Plavix; Take 1 tablet (75    mg) by mouth once daily.   empagliflozin 25 mg; Commonly known as: Jardiance; Take 1 tablet (25 mg)   by mouth once daily.   ezetimibe 10 mg tablet; Commonly known as: Zetia; Take 1 tablet (10 mg)   by mouth once daily.   lancets misc   levothyroxine 100 mcg tablet; Commonly known as: Synthroid, Levoxyl;   Take 1 tablet (100 mcg) by mouth once daily in the morning. Take before   meals.   metoprolol succinate XL 25 mg 24 hr tablet; Commonly known as:   Toprol-XL; TAKE ONE TABLET BY MOUTH ONCE DAILY. DO NOT CRUSH OR CHEW.   rosuvastatin 40 mg tablet; Commonly known as: Crestor; TAKE ONE TABLET   BY MOUTH EVERY DAY   Spiriva Respimat 2.5 mcg/actuation inhaler; Generic drug: tiotropium   Symbicort 160-4.5 mcg/actuation inhaler; Generic drug:   budesonide-formoteroL   Ventolin HFA 90 mcg/actuation inhaler; Generic drug: albuterol     STOP taking these medications     chlorhexidine 0.12 % solution; Commonly known as: Peridex   chlorhexidine 4 % external liquid; Commonly known as: Hibiclens   semaglutide 0.25 mg or 0.5 mg (2 mg/3 mL) pen injector       Outpatient Follow-Up  Future Appointments   Date Time Provider Department [place]   10/2/2024 11:00 AM Griffin Baltazar DO [id number] [place]   10/7/2024  2:00 PM Chris Leonard MD PhD [id number]   11/21/2024 10:40 AM RUY Perez-CNP [id number]       CELIA Crespo

## 2024-09-06 NOTE — NURSING NOTE
9/6/24 1408 Discharge Note:  Patient discharged to home. IVs removed. Patient and RN went over discharge instructions and stroke education and patient verbalized understanding. -Lana Chávez RN

## 2024-09-11 LAB
LABORATORY COMMENT REPORT: NORMAL
PATH REPORT.FINAL DX SPEC: NORMAL
PATH REPORT.GROSS SPEC: NORMAL
PATH REPORT.RELEVANT HX SPEC: NORMAL
PATH REPORT.TOTAL CANCER: NORMAL

## 2024-09-16 DIAGNOSIS — E11.9 TYPE 2 DIABETES MELLITUS WITHOUT COMPLICATION, WITHOUT LONG-TERM CURRENT USE OF INSULIN (MULTI): ICD-10-CM

## 2024-09-16 DIAGNOSIS — J44.1 COPD WITH ACUTE EXACERBATION (MULTI): Primary | ICD-10-CM

## 2024-09-18 ENCOUNTER — OFFICE VISIT (OUTPATIENT)
Dept: URGENT CARE | Age: 74
End: 2024-09-18
Payer: MEDICARE

## 2024-09-18 VITALS
BODY MASS INDEX: 25.06 KG/M2 | OXYGEN SATURATION: 95 % | DIASTOLIC BLOOD PRESSURE: 69 MMHG | RESPIRATION RATE: 22 BRPM | SYSTOLIC BLOOD PRESSURE: 157 MMHG | HEART RATE: 88 BPM | WEIGHT: 160 LBS

## 2024-09-18 DIAGNOSIS — J44.1 CHRONIC OBSTRUCTIVE PULMONARY DISEASE WITH ACUTE EXACERBATION (MULTI): Primary | ICD-10-CM

## 2024-09-18 RX ORDER — BENZONATATE 200 MG/1
200 CAPSULE ORAL 3 TIMES DAILY PRN
Qty: 20 CAPSULE | Refills: 0 | Status: SHIPPED | OUTPATIENT
Start: 2024-09-18 | End: 2024-09-25

## 2024-09-18 RX ORDER — IPRATROPIUM BROMIDE AND ALBUTEROL SULFATE 2.5; .5 MG/3ML; MG/3ML
3 SOLUTION RESPIRATORY (INHALATION) ONCE
Status: COMPLETED | OUTPATIENT
Start: 2024-09-18 | End: 2024-09-18

## 2024-09-18 RX ORDER — METHYLPREDNISOLONE 4 MG/1
TABLET ORAL
Qty: 21 TABLET | Refills: 0 | Status: SHIPPED | OUTPATIENT
Start: 2024-09-18 | End: 2024-09-25

## 2024-09-18 RX ORDER — AZITHROMYCIN 250 MG/1
TABLET, FILM COATED ORAL
Qty: 6 TABLET | Refills: 0 | Status: SHIPPED | OUTPATIENT
Start: 2024-09-18 | End: 2024-09-23

## 2024-09-18 ASSESSMENT — ENCOUNTER SYMPTOMS
WHEEZING: 1
SHORTNESS OF BREATH: 1
CARDIOVASCULAR NEGATIVE: 1
CHEST TIGHTNESS: 1
EYES NEGATIVE: 1
CONSTITUTIONAL NEGATIVE: 1
COUGH: 1
CHOKING: 1
GASTROINTESTINAL NEGATIVE: 1

## 2024-09-18 NOTE — PROGRESS NOTES
Subjective   Patient ID: Mare Ching is a 73 y.o. female. They present today with a chief complaint of COPD (Pt c/o dyspnea and right upper chest discomfort x1 week, pt reports symptoms similar to COPD exacerbation, pt using prescribed inhalers without relief).    History of Present Illness  Patient presents with. Chest tightness but states that is from increased phlegm. Approximately 2 weeks ago she had surgery on her carotids done and was put on a blood thinner. State she told them when she left that she felt short of breath but Alyssa did not do anything about it. Stated she does have COPD and has been using her inhalers. More frequently lately, because of the shortness of breath. She refuses, ER.     Past Medical History  Allergies as of 09/18/2024 - Reviewed 09/18/2024   Allergen Reaction Noted    Adhesive tape-silicones Other 08/03/2023       (Not in a hospital admission)       Past Medical History:   Diagnosis Date    Abdominal bloating 08/03/2023    Acute bacterial bronchitis 08/03/2023    Acute UTI 08/03/2023    Angina pectoris (CMS-HCC)     resolved per cardiology last note    Body mass index (BMI) 25.0-25.9, adult     BMI 25.0-25.9,adult    Body mass index (BMI) 26.0-26.9, adult     BMI 26.0-26.9,adult    Candidal vaginitis 08/03/2023    Carotid artery disease (CMS-HCC)     B/L Stenosis    Chest pain, atypical 08/03/2023    Chest pain, midsternal 08/03/2023    Chronic obstructive pulmonary disease, unspecified (Multi) 07/15/2021    COPD (chronic obstructive pulmonary disease)    Coronary artery disease     Delayed emergence from general anesthesia     Dysuria 08/03/2023    Flank pain 08/03/2023    GERD (gastroesophageal reflux disease)     Hip pain, right 08/03/2023    History of substance dependence (Multi) 04/04/2024    Hyperlipidemia, unspecified 10/03/2022    Hyperlipidemia    Hypertension     Hypothyroidism     Increased urinary frequency 08/03/2023    Other specified postprocedural states  2015    H/O colonoscopy    Personal history of other diseases of the respiratory system     History of asthma    PONV (postoperative nausea and vomiting)     Proteinuria 2023    Recurrent urinary tract infection 2023    Sialoadenitis 2023    SOB (shortness of breath) 2023    Type 2 diabetes mellitus without complications (Multi) 10/11/2022    Diabetes mellitus    Urinary urgency 2023       Past Surgical History:   Procedure Laterality Date    CARDIAC CATHETERIZATION N/A 10/23/2023    Procedure: Left Heart Cath, With LV (64208);  Surgeon: Manpreet Garg MD;  Location: [facility];  Service: Cardiovascular;  Laterality: N/A;     SECTION, CLASSIC      x2    CORONARY ANGIOPLASTY WITH STENT PLACEMENT      LAD    HYSTERECTOMY  2015    Hysterectomy    SALIVARY GLAND SURGERY Left     left parotidectomy    TUBAL LIGATION Bilateral         reports that she quit smoking about 24 years ago. Her smoking use included cigarettes. She started smoking about 54 years ago. She has a 60 pack-year smoking history. She has never used smokeless tobacco. She reports current alcohol use. She reports that she does not use drugs.    Review of Systems  Review of Systems   Constitutional: Negative.    HENT: Negative.     Eyes: Negative.    Respiratory:  Positive for cough, choking, chest tightness, shortness of breath and wheezing.    Cardiovascular: Negative.    Gastrointestinal: Negative.          Objective    Vitals:    24 1513   BP: 157/69   Pulse: 88   Resp: 22   SpO2: 95%   Weight: 72.6 kg (160 lb)     No LMP recorded. Patient is postmenopausal.    Physical Exam  CONSTITUTIONAL: The general appearance and condition of the patient were examined.  Level of distress, nutrition, external development abnormality, and general behavior were noted.  No abnormal findings.  Vital signs as documented.      CARDIOVASCULAR: The patient's heart examined for regular rate and rhythm  and presence of murmurs. Note taken of any tachycardia, bradycardia or any irregular rhythm.  No abnormal findings.        RESPIRATORY/LUNGS: Wheezing with deep breath.      GI/ABDOMEN: The patient's abdomen was inspected for signs of distention, surgical scars, or ascites.  Bowel sounds were evaluated.  The patient's abdomen was then palpated in each of four quadrants, including palpation for RLQ tenderness, any rebound or tenderness.  No abnormal findings.     Procedures    Point of Care Test & Imaging Results from this visit  No results found for this visit on 09/18/24.   No results found.    Diagnostic study results (if any) were reviewed by [facility].    Assessment/Plan   Allergies, medications, history, and pertinent labs/EKGs/Imaging reviewed by Missy Osborne, APRN-CNP.     Medical Decision Making    Patient did have improvement after Duoneb treatments.     This patient has elected to leave against medical advice. In my opinion, the patient has capacity to leave AMA. The patient is clinically sober, free from distracting injury, appears to have intact insight and judgment and reason, and in my opinion has capacity to make decisions. I explained to the patient that his symptoms may represent a PE or Cardiac Event and the patient verbalized understanding of my concerns.    I had a discussion with the patient about their workup and results, and that they are still at risk for a PE despite the Duoneb treatments. I explained the risks of leaving without further workup or treatment, which included reasonably foreseeable complications such as death, serious injury, permanent disability, and death.     The patient is refusing any further care, specifically transport to ER, and is leaving against medical advice.  I have asked the patient to follow up with their primary doctor as soon as possible. I have answered all their questions. Patient signed did sign A paperwork.     Orders and  Diagnoses  Diagnoses and all orders for this visit:  Chronic obstructive pulmonary disease with acute exacerbation (Multi)  -     ipratropium-albuteroL (Duo-Neb) 0.5-2.5 mg/3 mL nebulizer solution 3 mL  -     methylPREDNISolone (Medrol Dospak) 4 mg tablets; Take as directed on package.  -     benzonatate (Tessalon) 200 mg capsule; Take 1 capsule (200 mg) by mouth 3 times a day as needed for cough for up to 7 days. Do not crush or chew.  -     azithromycin (Zithromax) 250 mg tablet; Take 2 tabs (500 mg) by mouth today, than 1 daily for 4 days.  -     ipratropium-albuteroL (Duo-Neb) 0.5-2.5 mg/3 mL nebulizer solution 3 mL      Medical Admin Record  Administrations This Visit       ipratropium-albuteroL (Duo-Neb) 0.5-2.5 mg/3 mL nebulizer solution 3 mL       Admin Date  09/18/2024 Action  Given Dose  3 mL Route  nebulization Documented By  Luzmaria Christopher RN               Admin Date  09/18/2024 Action  Given Dose  3 mL Route  nebulization Documented By  Luzmaria Christopher RN                    Patient disposition: Home    Electronically signed by [facility]  3:51 PM

## 2024-09-27 DIAGNOSIS — E03.9 HYPOTHYROIDISM, UNSPECIFIED TYPE: ICD-10-CM

## 2024-09-27 DIAGNOSIS — E55.9 VITAMIN D DEFICIENCY: ICD-10-CM

## 2024-10-02 ENCOUNTER — APPOINTMENT (OUTPATIENT)
Dept: PRIMARY CARE | Facility: CLINIC | Age: 74
End: 2024-10-02
Payer: MEDICARE

## 2024-10-02 DIAGNOSIS — E78.00 PURE HYPERCHOLESTEROLEMIA: ICD-10-CM

## 2024-10-04 RX ORDER — LEVOTHYROXINE SODIUM 100 UG/1
100 TABLET ORAL
Qty: 30 TABLET | Refills: 0 | Status: SHIPPED | OUTPATIENT
Start: 2024-10-04

## 2024-10-04 RX ORDER — VIT C/E/ZN/COPPR/LUTEIN/ZEAXAN 250MG-90MG
25 CAPSULE ORAL DAILY
Qty: 30 CAPSULE | Refills: 0 | Status: SHIPPED | OUTPATIENT
Start: 2024-10-04

## 2024-10-07 ENCOUNTER — OFFICE VISIT (OUTPATIENT)
Dept: VASCULAR SURGERY | Facility: HOSPITAL | Age: 74
End: 2024-10-07
Payer: MEDICARE

## 2024-10-07 VITALS
WEIGHT: 153 LBS | SYSTOLIC BLOOD PRESSURE: 156 MMHG | BODY MASS INDEX: 23.96 KG/M2 | HEART RATE: 74 BPM | DIASTOLIC BLOOD PRESSURE: 69 MMHG

## 2024-10-07 DIAGNOSIS — I65.21 CAROTID STENOSIS, ASYMPTOMATIC, RIGHT: Primary | ICD-10-CM

## 2024-10-07 PROCEDURE — 3078F DIAST BP <80 MM HG: CPT | Performed by: SURGERY

## 2024-10-07 PROCEDURE — 99211 OFF/OP EST MAY X REQ PHY/QHP: CPT | Performed by: SURGERY

## 2024-10-07 PROCEDURE — 1159F MED LIST DOCD IN RCRD: CPT | Performed by: SURGERY

## 2024-10-07 PROCEDURE — 3051F HG A1C>EQUAL 7.0%<8.0%: CPT | Performed by: SURGERY

## 2024-10-07 PROCEDURE — 1157F ADVNC CARE PLAN IN RCRD: CPT | Performed by: SURGERY

## 2024-10-07 PROCEDURE — 3048F LDL-C <100 MG/DL: CPT | Performed by: SURGERY

## 2024-10-07 PROCEDURE — 1036F TOBACCO NON-USER: CPT | Performed by: SURGERY

## 2024-10-07 PROCEDURE — 3077F SYST BP >= 140 MM HG: CPT | Performed by: SURGERY

## 2024-10-07 ASSESSMENT — ENCOUNTER SYMPTOMS
DEPRESSION: 0
LOSS OF SENSATION IN FEET: 0
OCCASIONAL FEELINGS OF UNSTEADINESS: 0

## 2024-10-07 NOTE — PROGRESS NOTES
Vascular Surgery Clinic Note    PCP: Griffin Baltazar DO    CC: post-op follow-up  Subjective   HPI:  Mare Ching is 74 y.o. female with history of HTN, CAD s/p PCI, DM2, COPD, carotid stenosis s/p R CEA (9/5/24) who presents for 1-month post-operative follow-up. Recovering well. Reports small amount of pulling at base of the incision. Driving now. No CVA or TIA-like symptoms. Did not get carotid duplex scheduled for today. Takes plavix, zetia and Crestor    Past Vascular History:  9/5/24: Eversion right CEA for asymptomatic carotid stenosis (Dr. Leonard)    Past Vascular Testing:  Carotid duplex (5/13/24): R 614/210/10.9; L 205/63/2.9    PMH/PSH: HTN, CAD s/p PCI, DM2, COPD, carotid stenosis s/p R CEA (9/5/24)    Home Meds:  Current Outpatient Medications on File Prior to Visit   Medication Sig Dispense Refill    acetaminophen (Tylenol) 325 mg tablet Take 2 tablets (650 mg) by mouth every 6 hours if needed for mild pain (1 - 3).      amLODIPine (Norvasc) 5 mg tablet Take 1 tablet (5 mg) by mouth once daily. 90 tablet 3    blood sugar diagnostic (Accu-Chek Guide test strips) strip USE TO TEST BLOOD SUGAR 3 TIMES DAILY. 100 strip 2    cholecalciferol (Vitamin D-3) 25 MCG (1000 UT) capsule Take 1 capsule (25 mcg) by mouth once daily. NEEDS APPT 30 capsule 0    clopidogrel (Plavix) 75 mg tablet Take 1 tablet (75 mg) by mouth once daily. 90 tablet 3    empagliflozin (Jardiance) 25 mg Take 1 tablet (25 mg) by mouth once daily. 90 tablet 1    ezetimibe (Zetia) 10 mg tablet Take 1 tablet (10 mg) by mouth once daily. 90 tablet 3    levothyroxine (Synthroid, Levoxyl) 100 mcg tablet Take 1 tablet (100 mcg) by mouth once daily in the morning. Take before meals. TAKE BEFORE MEALS, NEEDS APPT 30 tablet 0    metoprolol succinate XL (Toprol-XL) 25 mg 24 hr tablet TAKE ONE TABLET BY MOUTH ONCE DAILY. DO NOT CRUSH OR CHEW. 90 tablet 3    rosuvastatin (Crestor) 40 mg tablet TAKE ONE TABLET BY MOUTH EVERY DAY 90 tablet 3     Spiriva Respimat 2.5 mcg/actuation inhaler Inhale 1 puff 2 times a day.      Symbicort 160-4.5 mcg/actuation inhaler Inhale 2 puffs 2 times a day.      Ventolin HFA 90 mcg/actuation inhaler Inhales as needed      lancets misc Use 3 times daily as directed      [DISCONTINUED] cholecalciferol (Vitamin D3) 25 MCG (1000 UT) capsule Take 1 capsule (25 mcg) by mouth once daily. 90 capsule 1    [DISCONTINUED] evolocumab (Repatha SureClick) 140 mg/mL injection Inject 1 mL (140 mg) under the skin every 14 (fourteen) days. 6 mL 3    [DISCONTINUED] levothyroxine (Synthroid, Levoxyl) 100 mcg tablet Take 1 tablet (100 mcg) by mouth once daily in the morning. Take before meals. 90 tablet 1     No current facility-administered medications on file prior to visit.      Allergies:  Allergies   Allergen Reactions    Adhesive Tape-Silicones Other     blisters     SH/FH:   Tobacco Use: Medium Risk (10/7/2024)    Patient History     Smoking Tobacco Use: Former     Smokeless Tobacco Use: Never     Passive Exposure: Not on file     ROS: 12 system negative except HPI    Objective   Vitals:  Vitals:    10/07/24 1402   BP: 156/69   Pulse:      Exam:  Constitutional: No acute distress  Neuro:  AOx3, grossly intact  CV: no tachycardia  Pulm: non-labored on room air  GI: soft, non-tender, non-distended  Skin: right neck incision healed  Extremities: sensory and motor intact, no wounds    Assessment/Plan   Mare Ching is 74 y.o. female with history of HTN, CAD s/p PCI, DM2, COPD, carotid stenosis s/p R CEA (9/5/24) who presents for 1-month post-operative follow-up. Recovering well.    Did not get her carotid duplex scheduled for today.     Plan:  Meds: Plavix, Rosuvastatin, Zetia  Screening/Surveillance: Carotid duplex  Follow-up: 2-month    Joce Pacheco MD

## 2024-10-28 DIAGNOSIS — E03.9 HYPOTHYROIDISM, UNSPECIFIED TYPE: ICD-10-CM

## 2024-10-28 RX ORDER — LEVOTHYROXINE SODIUM 100 UG/1
100 TABLET ORAL
Qty: 90 TABLET | Refills: 0 | Status: SHIPPED | OUTPATIENT
Start: 2024-10-28

## 2024-10-29 DIAGNOSIS — E55.9 VITAMIN D DEFICIENCY: ICD-10-CM

## 2024-10-29 RX ORDER — VIT C/E/ZN/COPPR/LUTEIN/ZEAXAN 250MG-90MG
25 CAPSULE ORAL DAILY
Qty: 30 CAPSULE | Refills: 0 | Status: SHIPPED | OUTPATIENT
Start: 2024-10-29

## 2024-11-05 ENCOUNTER — APPOINTMENT (OUTPATIENT)
Dept: PRIMARY CARE | Facility: CLINIC | Age: 74
End: 2024-11-05
Payer: MEDICARE

## 2024-11-05 VITALS
BODY MASS INDEX: 24.48 KG/M2 | HEIGHT: 67 IN | DIASTOLIC BLOOD PRESSURE: 62 MMHG | WEIGHT: 156 LBS | SYSTOLIC BLOOD PRESSURE: 130 MMHG

## 2024-11-05 DIAGNOSIS — M79.10 MYALGIA: Primary | ICD-10-CM

## 2024-11-05 DIAGNOSIS — E11.9 TYPE 2 DIABETES MELLITUS WITHOUT COMPLICATION, UNSPECIFIED WHETHER LONG TERM INSULIN USE (MULTI): ICD-10-CM

## 2024-11-05 DIAGNOSIS — Z12.11 COLON CANCER SCREENING: ICD-10-CM

## 2024-11-05 DIAGNOSIS — E78.2 MIXED HYPERLIPIDEMIA: ICD-10-CM

## 2024-11-05 PROCEDURE — 3075F SYST BP GE 130 - 139MM HG: CPT | Performed by: INTERNAL MEDICINE

## 2024-11-05 PROCEDURE — 3051F HG A1C>EQUAL 7.0%<8.0%: CPT | Performed by: INTERNAL MEDICINE

## 2024-11-05 PROCEDURE — 99215 OFFICE O/P EST HI 40 MIN: CPT | Performed by: INTERNAL MEDICINE

## 2024-11-05 PROCEDURE — 1157F ADVNC CARE PLAN IN RCRD: CPT | Performed by: INTERNAL MEDICINE

## 2024-11-05 PROCEDURE — 1123F ACP DISCUSS/DSCN MKR DOCD: CPT | Performed by: INTERNAL MEDICINE

## 2024-11-05 PROCEDURE — 3078F DIAST BP <80 MM HG: CPT | Performed by: INTERNAL MEDICINE

## 2024-11-05 PROCEDURE — 1159F MED LIST DOCD IN RCRD: CPT | Performed by: INTERNAL MEDICINE

## 2024-11-05 PROCEDURE — 3008F BODY MASS INDEX DOCD: CPT | Performed by: INTERNAL MEDICINE

## 2024-11-05 PROCEDURE — 1036F TOBACCO NON-USER: CPT | Performed by: INTERNAL MEDICINE

## 2024-11-05 PROCEDURE — G2211 COMPLEX E/M VISIT ADD ON: HCPCS | Performed by: INTERNAL MEDICINE

## 2024-11-05 PROCEDURE — 1158F ADVNC CARE PLAN TLK DOCD: CPT | Performed by: INTERNAL MEDICINE

## 2024-11-05 PROCEDURE — 1160F RVW MEDS BY RX/DR IN RCRD: CPT | Performed by: INTERNAL MEDICINE

## 2024-11-05 PROCEDURE — 3048F LDL-C <100 MG/DL: CPT | Performed by: INTERNAL MEDICINE

## 2024-11-05 RX ORDER — UBIDECARENONE 30 MG
30 CAPSULE ORAL DAILY
Qty: 90 CAPSULE | Refills: 1 | Status: SHIPPED | OUTPATIENT
Start: 2024-11-05 | End: 2025-02-03

## 2024-11-05 NOTE — PROGRESS NOTES
"Subjective   Patient ID: Mare Ching is a 74 y.o. female who presents for Follow-up.  HPI        female history COPD tobacco abuse diabetes hypothyroidism muscle cramps moderate CAD. UTI anxiety unstable angina s/p cath 4/2023 PCI, cath 10/2023 .889 ffr LAD lesion, right carotid stenosis  Status post right CEA September 5, 2024    Overall feels well aside from some myalgias months grade 5 she feels worse with statin therapy nothing makes better  Denies trauma paralysis paresthesias jt swelling           Health Maintenance:      Colonoscopy: 2019      Mammogram: 2024      Pelvic/Pap:      Low dose chest CT:      Aorta duplex:      Optho:      Podiatry:        Vaccines:      Refer to Epic Vaccination Log        ROS:      General: denies fever/chills/weight loss      Head: denies HA/trauma/masses/dizziness      Eyes: denies vision change/loss of vision/blurry vision/diplopia/eye pain      Ears: denies hearing loss/tinnitus/otalgia/otorrhea      Nose: denies nasal drainage/anosmia      Throat: denies dysphagia/odynophagia      Lymphatics: denies lymph node swelling      Breast: denies masses/discharge/dimpling/skin changes      Cardiac: denies CP/palpitations/orthopnea/PND      Pulmonary:wheezing seems worse around bleach products makes her cough lung doctor told her she has asthma gave her bronchodilators denies dyspnea/cough/wheezing      GI: denies abd pain/n/v/diarrhea/melena/hematochezia/hematemesis      : denies dysuria/hematuria/change frequency      Genital: denies genital discharge/lesions      Skin: denies rashes/lesions/masses      MSK: Myalgias; occasionally leg swelling at night seems worse with using Norvasc denies weakness/swelling/edema/gait imbalance/pain      Neuro: denies paresthesias/seizures/dysarthria      Psych: Severe claustrophobia with MRI denies depression/anxiety/suicidal or homicidal ideations            Objective   /62   Ht 1.702 m (5' 7\")   Wt 70.8 kg (156 lb)   BMI " "24.43 kg/m²      Physical Exam:     General: AO3, NAD     Head: atraumatic/NC     Eyes: EOMI/PERRLA. Negative APD     Ears: TM pearly gray, EAC clear. No lesions or erythema     Nose: symmetric nares, no discharge     Throat: Right anterior triangle oblique scar clean dry intact about 7 cm trachea midline, uvula midline pink mucosa. No thyromegaly     Lymphatics: no cervical/supraclavicular/ant or posterior cervical adenopathy/axillary/inguinal adenopathy     Breast: not examined     Chest: no deformity or tenderness to palpation     Pulm: CTA b/l, no wheeze/rhonchi/rales. nonlabored     Cardiac: RRR +s1s2, no m/r/g.      GI: soft, NT/ND. Normoactive Bsx4. No rebound/guarding.     Rectal: not examined     Genital: not examined     MSK:  5/5 strength UE LE. No edema/clubbing/cyanosis     Skin: no rashes/lesions     Vascular: 1+ palp DP PT radials b/l. Negative carotid bruit     Neuro: CNII-XII intact. No focal deficits. Reflexes 2/4 brachioradialis bicep tricep patellar achilles. Finger to nose intact.     Psych: appropriate mood/affect                    No results found for: \"BMPR1A\", \"CBCDIF\"    I offered another option for diabetes control but his Trulicity she deferred this option at this time states she only wants to try to do it on her own at this point    I recommended echocardiogram as well as venous reflux studies lower extremity given chronic leg swelling she refused states she is going to talk to cardiology to review this first  Assessment/Plan   Diagnoses and all orders for this visit:  Myalgia  Comments:  statin  Orders:  -     co-enzyme Q-10 30 mg capsule; Take 1 capsule (30 mg) by mouth once daily.  Colon cancer screening  -     Colonoscopy Screening; Average Risk Patient; Future  Mixed hyperlipidemia  Type 2 diabetes mellitus without complication, unspecified whether long term insulin use (Multi)    I recommend following up with endocrinology as ordered  Goal A1c less than 7.5%    Call and follow-up " with vascular recommendations noted Plavix rosuvastatin and Zetia follow-up in 2 months carotid duplex    Follow-up with your new cardiologist recommendations noted lipid panel 3 months add Norvasc 5 mg a day Zetia 10 mg a day follow-up in 4 months as you stated appointment coming up November 2024        Go to ER for any worsening head pain headaches or other concerning symptoms    Call and follow-up with pulmonary medicine    Call follow-up with ENT      Screening blood work due July 2025    Thank you for making appointment today Mare    Please follow-up 3 months    Griffin Baltazar DO, MARY Baltazar DO

## 2024-11-21 ENCOUNTER — OFFICE VISIT (OUTPATIENT)
Dept: CARDIOLOGY | Facility: HOSPITAL | Age: 74
End: 2024-11-21
Payer: MEDICARE

## 2024-11-21 VITALS
SYSTOLIC BLOOD PRESSURE: 150 MMHG | BODY MASS INDEX: 24.17 KG/M2 | OXYGEN SATURATION: 95 % | DIASTOLIC BLOOD PRESSURE: 62 MMHG | WEIGHT: 154 LBS | RESPIRATION RATE: 18 BRPM | HEART RATE: 71 BPM | HEIGHT: 67 IN

## 2024-11-21 DIAGNOSIS — I25.10 CAD IN NATIVE ARTERY: ICD-10-CM

## 2024-11-21 DIAGNOSIS — E78.00 PURE HYPERCHOLESTEROLEMIA: ICD-10-CM

## 2024-11-21 DIAGNOSIS — I10 ESSENTIAL HYPERTENSION: Primary | ICD-10-CM

## 2024-11-21 PROCEDURE — 99214 OFFICE O/P EST MOD 30 MIN: CPT | Performed by: NURSE PRACTITIONER

## 2024-11-21 PROCEDURE — 99417 PROLNG OP E/M EACH 15 MIN: CPT | Performed by: NURSE PRACTITIONER

## 2024-11-21 PROCEDURE — 1157F ADVNC CARE PLAN IN RCRD: CPT | Performed by: NURSE PRACTITIONER

## 2024-11-21 PROCEDURE — 3077F SYST BP >= 140 MM HG: CPT | Performed by: NURSE PRACTITIONER

## 2024-11-21 PROCEDURE — 3051F HG A1C>EQUAL 7.0%<8.0%: CPT | Performed by: NURSE PRACTITIONER

## 2024-11-21 PROCEDURE — 3048F LDL-C <100 MG/DL: CPT | Performed by: NURSE PRACTITIONER

## 2024-11-21 PROCEDURE — 3008F BODY MASS INDEX DOCD: CPT | Performed by: NURSE PRACTITIONER

## 2024-11-21 PROCEDURE — 3078F DIAST BP <80 MM HG: CPT | Performed by: NURSE PRACTITIONER

## 2024-11-21 PROCEDURE — 1159F MED LIST DOCD IN RCRD: CPT | Performed by: NURSE PRACTITIONER

## 2024-11-21 RX ORDER — IPRATROPIUM BROMIDE AND ALBUTEROL SULFATE 2.5; .5 MG/3ML; MG/3ML
SOLUTION RESPIRATORY (INHALATION)
COMMUNITY
Start: 2024-10-24

## 2024-11-21 RX ORDER — MONTELUKAST SODIUM 10 MG/1
TABLET ORAL
COMMUNITY
Start: 2024-10-24

## 2024-11-21 RX ORDER — SPIRONOLACTONE 25 MG/1
12.5 TABLET ORAL DAILY
Qty: 15 TABLET | Refills: 3 | Status: SHIPPED | OUTPATIENT
Start: 2024-11-21 | End: 2024-12-21

## 2024-11-21 NOTE — PATIENT INSTRUCTIONS
Stop Amlodipine  Start Spironolactone 12.5 mg once a day ( you will need to cut you 25 mg tablet in half) for blood pressure  Stop Ezetimibe   Continue all other meds  Check blood work in one week BMP  Follow up in 3 months  Monitor blood pressure at home. Goal for blood pressure is less than 130/80

## 2024-11-21 NOTE — PROGRESS NOTES
Primary Care Physician: Griffin Baltazar DO  Date of Visit: 11/21/2024 10:40 AM EST  Location of visit: [facility]     Chief Complaint:   Chief Complaint   Patient presents with    Follow-up        HPI / Summary:   Mare Ching is a 74 y.o. female presents for followup.Seen in collaboration with Dr. Torres. She was hospitalized 9/5 to 9/6 at which time she underwent a right eversion carotid endarterectomy. She has an occasional stable chronic dyspnea on exertion with walking prolonged distances that is more sporadic. She has been walking 4 miles daily without chest pain. She has noted ankle swelling since taking Amlodipine. She has been wearing compression stockings. She has noted leg cramping particularly at night more frequent since taking Ezetimibe. The patient denies chest pain, palpitations, lightheadedness, syncope, orthopnea, paroxysmal nocturnal dyspnea, or bleeding problems.     She monitors her blood pressure at home. Reports systolic blood pressures have been 120s to 130s at home.           Past Medical History:  Past Medical History:   Diagnosis Date    Abdominal bloating 08/03/2023    Acute bacterial bronchitis 08/03/2023    Acute UTI 08/03/2023    Angina pectoris     resolved per cardiology last note    Body mass index (BMI) 25.0-25.9, adult     BMI 25.0-25.9,adult    Body mass index (BMI) 26.0-26.9, adult     BMI 26.0-26.9,adult    Candidal vaginitis 08/03/2023    Carotid artery disease ([facility])     B/L Stenosis    Chest pain, atypical 08/03/2023    Chest pain, midsternal 08/03/2023    Chronic obstructive pulmonary disease, unspecified 07/15/2021    COPD (chronic obstructive pulmonary disease)    Coronary artery disease     Delayed emergence from general anesthesia     Dysuria 08/03/2023    Flank pain 08/03/2023    GERD (gastroesophageal reflux disease)     Hip pain, right 08/03/2023    History of substance dependence (Multi) 04/04/2024    Hyperlipidemia, unspecified 10/03/2022     Hyperlipidemia    Hypertension     Hypothyroidism     Increased urinary frequency 2023    Other specified postprocedural states 2015    H/O colonoscopy    Personal history of other diseases of the respiratory system     History of asthma    PONV (postoperative nausea and vomiting)     Proteinuria 2023    Recurrent urinary tract infection 2023    Sialoadenitis 2023    SOB (shortness of breath) 2023    Type 2 diabetes mellitus without complications (Multi) 10/11/2022    Diabetes mellitus    Urinary urgency 2023        Past Surgical History:  Past Surgical History:   Procedure Laterality Date    CARDIAC CATHETERIZATION N/A 10/23/2023    Procedure: Left Heart Cath, With LV (40676);  Surgeon: Manpreet Garg MD;  Location: [facility] Cardiac Cath Lab;  Service: Cardiovascular;  Laterality: N/A;     SECTION, CLASSIC      x2    CORONARY ANGIOPLASTY WITH STENT PLACEMENT      LAD    HYSTERECTOMY  2015    Hysterectomy    SALIVARY GLAND SURGERY Left     left parotidectomy    TUBAL LIGATION Bilateral           Social History:   reports that she quit smoking about 24 years ago. Her smoking use included cigarettes. She started smoking about 54 years ago. She has a 60 pack-year smoking history. She has never used smokeless tobacco. She reports current alcohol use. She reports that she does not use drugs.     Family History:  family history includes Cancer in her father and mother; Diabetes in her mother; Pancreatic cancer in her mother; Stroke in her father; cardiac disorder in an other family member; urinary cancer in her father.      Allergies:  Allergies   Allergen Reactions    Adhesive Tape-Silicones Other     blisters       Outpatient Medications:  Current Outpatient Medications   Medication Instructions    acetaminophen (TYLENOL) 650 mg, oral, Every 6 hours PRN    amLODIPine (NORVASC) 5 mg, oral, Daily    blood sugar diagnostic (Accu-Chek Guide test strips) strip USE TO  "TEST BLOOD SUGAR 3 TIMES DAILY.    cholecalciferol (VITAMIN D-3) 25 mcg, oral, Daily, NEEDS APPT    clopidogrel (PLAVIX) 75 mg, oral, Daily    co-enzyme Q-10 30 mg, oral, Daily    empagliflozin (JARDIANCE) 25 mg, oral, Daily    ezetimibe (ZETIA) 10 mg, oral, Daily    ipratropium-albuteroL (Duo-Neb) 0.5-2.5 mg/3 mL nebulizer solution     lancets misc Use 3 times daily as directed    levothyroxine (SYNTHROID, LEVOXYL) 100 mcg, oral, Daily before breakfast, TAKE BEFORE MEALS    metoprolol succinate XL (Toprol-XL) 25 mg 24 hr tablet TAKE ONE TABLET BY MOUTH ONCE DAILY. DO NOT CRUSH OR CHEW.    montelukast (Singulair) 10 mg tablet     rosuvastatin (CRESTOR) 40 mg, oral, Daily    Spiriva Respimat 2.5 mcg/actuation inhaler 1 puff, 2 times daily    Symbicort 160-4.5 mcg/actuation inhaler 2 puffs, 2 times daily RT    Ventolin HFA 90 mcg/actuation inhaler Inhales as needed       Physical Exam:  Vitals:    11/21/24 1040 11/21/24 1050   BP: 175/70 147/72   BP Location: Left arm Left arm   Patient Position: Sitting Sitting   Pulse: 68 71   Resp: 18    SpO2: 94% 95%   Weight: 69.9 kg (154 lb)    Height: 1.702 m (5' 7\")      Wt Readings from Last 5 Encounters:   11/21/24 69.9 kg (154 lb)   11/05/24 70.8 kg (156 lb)   10/07/24 69.4 kg (153 lb)   09/18/24 72.6 kg (160 lb)   09/05/24 70.1 kg (154 lb 8.7 oz)     Body mass index is 24.12 kg/m².   GENERAL: alert, cooperative, pleasant, in no acute distress  SKIN: warm and dry  NECK: Normal JVD, negative HJR  CARDIAC: Regular rate and rhythm with no rubs, murmurs, or gallops  CHEST: Normal respiratory efforts, lungs clear to auscultation bilaterally.  ABDOMEN: soft, nontender, nondistended  EXTREMITIES: no edema, +2 palpable RP and DP pulses bilaterally       Last Labs:  Recent Labs     09/06/24  0949 08/28/24  1038 07/10/24  0912   WBC 11.4* 6.9 6.6   HGB 13.3 14.3 13.6   HCT 42.5 43.7 43.3    222 246   MCV 88 87 88     Recent Labs     09/06/24  0949 08/28/24  1038 08/14/24  0934 "    139 138   K 3.7 4.7 4.5    101 101   BUN 14 12 18   CREATININE 0.80 0.57 0.72     CMP -  Lab Results   Component Value Date    CALCIUM 9.6 09/06/2024    PHOS 2.7 09/06/2024    PROT 6.9 07/10/2024    ALBUMIN 4.6 07/10/2024    AST 19 07/10/2024    ALT 26 07/10/2024    ALKPHOS 46 07/10/2024    BILITOT 0.6 07/10/2024       LIPID PANEL -   Lab Results   Component Value Date    CHOL 142 08/22/2024    HDL 54.7 08/22/2024    LDLF 73 03/01/2023    TRIG 99 08/22/2024       Lab Results   Component Value Date    HGBA1C 7.0 (H) 08/30/2024       Last Cardiology Tests:  ECG:  Reviewed EKG from 7/17/25- normal sinus rhythm HR 68, LAD, RBBB, possible prior septal infarct    Echo:  Echocardiogram April 6, 2023  CONCLUSIONS:  1. Left ventricular systolic function is normal with a 60-65% estimated ejection fraction.  2. Mild mitral regurgitation.        Cath:  Cardiac catheterization October 23, 2023  CONCLUSIONS:   1. Moderate ostial and mild mid LAD disease with an RFR of 0.89.   2. Patent proximal to mid LAD stent.   3. Mild circumflex and RCA disease.   4. Elevated left heart filling pressures.   5. Normal left ventricular systolic function.  In the distal portion of the stent, RFR was 0.92, in the proximal portion of stent, RFR was 0.95, in the left main, RFR was 0.98.      Cardiac catheterization April 17, 2023  Coronary Lesion Summary:  Vessel   Stenosis         Vessel Segment  LAD    90% stenosis            mid  LAD    60% stenosis      proximal to mid  LAD    70% stenosis distal third of the distal  CONCLUSIONS:  1. Successful IVUS-guided PCI and rotational atherectomy of LAD with 3.0 x 38 mm Resolute [place] ZES post-dilated with 3.5 mm and 4.0 mm NC balloons.  2. Elevated left-sided filling pressure (LVEDP 18 mmHg) and no gradient on pull-back.  3. Angiographically mild CAD in the LCX and RCA suitable for medical therapy.           Stress Test:  Lexiscan nuclear stress test March 31,  2022  IMPRESSION:  1. Normal stress myocardial perfusion imaging in response to  pharmacologic stress.  2. Well-maintained left ventricular function.  80%        Cardiac Imaging:  Carotid ultrasound May 13, 2024  CONCLUSIONS:  Right Carotid: Findings are consistent with greater than 70% stenosis of the right proximal internal carotid artery. Turbulent flow seen by color Doppler. Right external carotid artery appears patent with no evidence of stenosis. The right vertebral artery is patent with antegrade flow. No evidence of hemodynamically significant stenosis in the right subclavian artery.  Left Carotid: Findings are consistent with 50 to 69% stenosis of the left proximal internal carotid artery. Left external carotid artery appears patent with no evidence of stenosis. The left vertebral artery is patent with antegrade flow. No evidence of hemodynamically significant stenosis in the left subclavian artery.     MRA of the head and neck May 1, 2024  IMPRESSION:  MRI Brain:  1. There is no evidence of acute hemorrhage, mass lesion or acute  infarction..      MRA HEAD/NECK :  1. Near-complete loss of flow related signal consistent with severe  stenosis in the most proximal right ICA with reconstitution distally.  The remainder of the right ICA vessel remains diffusely diminutive in  diameter relative to the left.  2. High-grade stenosis of the proximal cervical left ICA with  expected flow signal distally.     CT abdomen and pelvis January 2022  VESSELS:  There is no aneurysmal dilatation of the abdominal aorta. There are  moderate atherosclerotic calcifications of the abdominal aorta and  its branches. Limited evaluation of the IVC on noncontrast imaging.     CT cardiac scoring June 2019  The imaged ascending, and descending thoracic aorta are normal in  course, caliber, and contour except for mild scattered  calcifications. The aortic arch is not  included in this study.     CORONARY ANATOMY:     Origins:Normal  coronary artery origins.     Calcium Score (Agatston):  LM:   0  LAD:  476  LCx:  0  RCA: 113  ----------------------  Total: 589       Assessment/Plan   Problem List Items Addressed This Visit          Cardiac and Vasculature    Hyperlipidemia    Essential hypertension - Primary    Relevant Medications    spironolactone (Aldactone) 25 mg tablet    Other Relevant Orders    Basic metabolic panel    CAD in native artery     In summary Ms. Ching is a pleasant 74-year-old white female with a past medical history significant for coronary artery disease with a CT calcium score of 589 in 2019 with subsequent PCI to the LAD in the setting of unstable angina with preserved LV function, bilateral carotid artery disease, type II non-insulin-requiring diabetes, hypertension, hyperlipidemia, and COPD.  She has residual moderate disease involving the ostium and mid to distal LAD.  Her RFR was borderline abnormal distally but nonischemic in the mid LAD.  She has had resolution of her prior angina which manifested as jaw discomfort and left arm discomfort.  She has relatively stable chronic dyspnea on exertion that is sporadic and likely is predominantly due to her underlying COPD.  She is euvolemic on exam. I stopped Amlodipine due to ankle swelling. I started Spironolactone as above. Patient was agreeable to start 12.5 mg of Spironolactone. I have ordered blood work to be done in one week. She will continue to monitor blood pressure at home. I instructed her to stop Ezetimibe as I suspect this may be contributing to her myalgias. She will continue all other current cardiovascular medications. She will follow up in 3 months.                Orders:  No orders of the defined types were placed in this encounter.     Followup Appts:  Future Appointments   Date Time Provider Department Center   12/2/2024 10:30 AM [facility]   12/2/2024 11:40 AM Chris Leonard MD PhD [facility]   2/4/2025 12:00 PM Griffin Baltazar DO  [id number] [place]           ____________________________________________________________  CELIA Perez  [facility]

## 2024-12-02 ENCOUNTER — HOSPITAL ENCOUNTER (OUTPATIENT)
Dept: VASCULAR MEDICINE | Facility: HOSPITAL | Age: 74
Discharge: HOME | End: 2024-12-02
Payer: MEDICARE

## 2024-12-02 ENCOUNTER — OFFICE VISIT (OUTPATIENT)
Dept: VASCULAR SURGERY | Facility: HOSPITAL | Age: 74
End: 2024-12-02
Payer: MEDICARE

## 2024-12-02 VITALS
HEIGHT: 67 IN | BODY MASS INDEX: 24.33 KG/M2 | OXYGEN SATURATION: 95 % | HEART RATE: 66 BPM | SYSTOLIC BLOOD PRESSURE: 169 MMHG | WEIGHT: 155 LBS | DIASTOLIC BLOOD PRESSURE: 83 MMHG

## 2024-12-02 DIAGNOSIS — I65.23 BILATERAL CAROTID ARTERY STENOSIS: ICD-10-CM

## 2024-12-02 DIAGNOSIS — I65.22 CAROTID STENOSIS, ASYMPTOMATIC, LEFT: Primary | ICD-10-CM

## 2024-12-02 PROCEDURE — 93880 EXTRACRANIAL BILAT STUDY: CPT | Performed by: INTERNAL MEDICINE

## 2024-12-02 PROCEDURE — 93880 EXTRACRANIAL BILAT STUDY: CPT

## 2024-12-02 PROCEDURE — 99214 OFFICE O/P EST MOD 30 MIN: CPT | Mod: 25 | Performed by: SURGERY

## 2024-12-02 NOTE — PROGRESS NOTES
Vascular Surgery Consult/Clinic Note    CC: Follow up    HPI:  Mare Ching is 74 y.o. female with history of HTN, CAD s/p PCI, DM2, COPD, carotid stenosis s/p R CEA (9/5/24).  Recovering well; denies any complaints.   No CVA or TIA-like symptoms.   She returns today with carotid duplex.   Takes plavix, zetia and Crestor     Past Vascular History:  9/5/24: Eversion right CEA for asymptomatic carotid stenosis (Dr. Leonard)     Past Vascular Testing:  Carotid duplex (5/13/24): R 614/210/10.9; L 205/63/2.9     PMH/PSH: HTN, CAD s/p PCI, DM2, COPD, carotid stenosis s/p R CEA (9/5/24)    Meds:   Current Outpatient Medications on File Prior to Visit   Medication Sig Dispense Refill    acetaminophen (Tylenol) 325 mg tablet Take 2 tablets (650 mg) by mouth every 6 hours if needed for mild pain (1 - 3). (Patient not taking: Reported on 11/21/2024)      blood sugar diagnostic (Accu-Chek Guide test strips) strip USE TO TEST BLOOD SUGAR 3 TIMES DAILY. 100 strip 2    cholecalciferol (Vitamin D-3) 25 MCG (1000 UT) capsule Take 1 capsule (25 mcg) by mouth once daily. NEEDS APPT 30 capsule 0    clopidogrel (Plavix) 75 mg tablet Take 1 tablet (75 mg) by mouth once daily. 90 tablet 3    co-enzyme Q-10 30 mg capsule Take 1 capsule (30 mg) by mouth once daily. (Patient not taking: Reported on 11/21/2024) 90 capsule 1    empagliflozin (Jardiance) 25 mg Take 1 tablet (25 mg) by mouth once daily. 90 tablet 1    ipratropium-albuteroL (Duo-Neb) 0.5-2.5 mg/3 mL nebulizer solution       lancets misc Use 3 times daily as directed      levothyroxine (Synthroid, Levoxyl) 100 mcg tablet TAKE ONE TABLET BY MOUTH ONCE DAILY IN THE MORNING. TAKE BEFORE MEALS. 90 tablet 0    metoprolol succinate XL (Toprol-XL) 25 mg 24 hr tablet TAKE ONE TABLET BY MOUTH ONCE DAILY. DO NOT CRUSH OR CHEW. 90 tablet 3    montelukast (Singulair) 10 mg tablet       rosuvastatin (Crestor) 40 mg tablet TAKE ONE TABLET BY MOUTH EVERY DAY 90 tablet 3    Spiriva Respimat  2.5 mcg/actuation inhaler Inhale 1 puff 2 times a day.      spironolactone (Aldactone) 25 mg tablet Take 0.5 tablets (12.5 mg) by mouth once daily. 15 tablet 3    Symbicort 160-4.5 mcg/actuation inhaler Inhale 2 puffs 2 times a day.      Ventolin HFA 90 mcg/actuation inhaler Inhales as needed       No current facility-administered medications on file prior to visit.        Allergies:   Allergies   Allergen Reactions    Adhesive Tape-Silicones Other     blisters       SH:    Social Drivers of Health     Tobacco Use: Medium Risk (11/21/2024)    Patient History     Smoking Tobacco Use: Former     Smokeless Tobacco Use: Never     Passive Exposure: Not on file   Alcohol Use: Not At Risk (5/14/2024)    AUDIT-C     Frequency of Alcohol Consumption: Never     Average Number of Drinks: Patient does not drink     Frequency of Binge Drinking: Never   Financial Resource Strain: Not on file   Food Insecurity: Not on file   Transportation Needs: Not on file   Physical Activity: Not on file   Stress: Not on file   Social Connections: Not on file   Intimate Partner Violence: Not on file   Depression: Not at risk (7/2/2024)    PHQ-2     PHQ-2 Score: 0   Housing Stability: Not on file   Utilities: Not on file   Digital Equity: Not on file   Health Literacy: Not on file        FH:  Family History   Problem Relation Name Age of Onset    Cancer Mother      Diabetes Mother      Pancreatic cancer Mother      Stroke Father          recurrent    Cancer Father      Other (urinary cancer) Father      Other (cardiac disorder) Other         Objective:  Vitals:  Vitals:    12/02/24 1140   BP: 169/83   Pulse: 66   SpO2: 95%        Exam:  Gen: in NAD  GI: Soft, ND/NT  Ext:  Right neck incision c/d/I.   BUE/BLE 5/5 motor str.     Imaging:  Carotid duplex (12/2/2024) independently reviewed.   R ICA patent without flow limiting stenosis.   L ICA with 50-69% stenosis.     Assessment & Plan:  Mare JULIETTE Ching is 74 y.o. female s/p R CEA, and Asx. L  ICA stenosis.    - Doing well. Cont. Plavix and statin therapy.    - Follow up in 6 months with carotid duplex for surveillance.       Chris Leonard MD, PhD  Clinical   [facility] [employer]  Co-Director, Aortic Center  [facility] [place]

## 2024-12-09 DIAGNOSIS — E55.9 VITAMIN D DEFICIENCY: ICD-10-CM

## 2024-12-10 RX ORDER — VIT C/E/ZN/COPPR/LUTEIN/ZEAXAN 250MG-90MG
CAPSULE ORAL
Qty: 90 CAPSULE | Refills: 0 | Status: SHIPPED | OUTPATIENT
Start: 2024-12-10

## 2024-12-16 ENCOUNTER — LAB (OUTPATIENT)
Dept: LAB | Facility: LAB | Age: 74
End: 2024-12-16
Payer: MEDICARE

## 2024-12-16 DIAGNOSIS — I10 ESSENTIAL HYPERTENSION: ICD-10-CM

## 2024-12-16 LAB
ANION GAP SERPL CALC-SCNC: 16 MMOL/L (ref 10–20)
BUN SERPL-MCNC: 17 MG/DL (ref 6–23)
CALCIUM SERPL-MCNC: 10.5 MG/DL (ref 8.6–10.6)
CHLORIDE SERPL-SCNC: 103 MMOL/L (ref 98–107)
CO2 SERPL-SCNC: 27 MMOL/L (ref 21–32)
CREAT SERPL-MCNC: 0.8 MG/DL (ref 0.5–1.05)
EGFRCR SERPLBLD CKD-EPI 2021: 77 ML/MIN/1.73M*2
GLUCOSE SERPL-MCNC: 155 MG/DL (ref 74–99)
POTASSIUM SERPL-SCNC: 5.1 MMOL/L (ref 3.5–5.3)
SODIUM SERPL-SCNC: 141 MMOL/L (ref 136–145)

## 2024-12-16 PROCEDURE — 80048 BASIC METABOLIC PNL TOTAL CA: CPT

## 2024-12-16 PROCEDURE — 36415 COLL VENOUS BLD VENIPUNCTURE: CPT

## 2024-12-17 DIAGNOSIS — I10 ESSENTIAL HYPERTENSION: Primary | ICD-10-CM

## 2024-12-17 NOTE — RESULT ENCOUNTER NOTE
Potassium on high side of normal. Recommended monitoring potassium in diet. Repeat BMP in a month. Reviewed with patient on the phone. She is agreeable to plan.

## 2024-12-24 DIAGNOSIS — E11.9 TYPE 2 DIABETES MELLITUS WITHOUT COMPLICATION, UNSPECIFIED WHETHER LONG TERM INSULIN USE (MULTI): ICD-10-CM

## 2024-12-26 RX ORDER — EMPAGLIFLOZIN 25 MG/1
25 TABLET, FILM COATED ORAL DAILY
Qty: 90 TABLET | Refills: 0 | Status: SHIPPED | OUTPATIENT
Start: 2024-12-26

## 2025-01-08 ENCOUNTER — OFFICE VISIT (OUTPATIENT)
Dept: PRIMARY CARE | Facility: CLINIC | Age: 75
End: 2025-01-08
Payer: MEDICARE

## 2025-01-08 VITALS — WEIGHT: 152 LBS | BODY MASS INDEX: 23.81 KG/M2 | DIASTOLIC BLOOD PRESSURE: 70 MMHG | SYSTOLIC BLOOD PRESSURE: 134 MMHG

## 2025-01-08 DIAGNOSIS — I10 ESSENTIAL HYPERTENSION: ICD-10-CM

## 2025-01-08 DIAGNOSIS — Z91.89 RISK OF BECOMING OVERWEIGHT: ICD-10-CM

## 2025-01-08 DIAGNOSIS — R10.84 GENERALIZED ABDOMINAL PAIN: ICD-10-CM

## 2025-01-08 DIAGNOSIS — Z00.00 HEALTHCARE MAINTENANCE: Primary | ICD-10-CM

## 2025-01-08 DIAGNOSIS — J44.1 COPD WITH ACUTE EXACERBATION (MULTI): ICD-10-CM

## 2025-01-08 DIAGNOSIS — M54.9 BACK PAIN, UNSPECIFIED BACK LOCATION, UNSPECIFIED BACK PAIN LATERALITY, UNSPECIFIED CHRONICITY: ICD-10-CM

## 2025-01-08 DIAGNOSIS — E78.2 MIXED HYPERLIPIDEMIA: ICD-10-CM

## 2025-01-08 DIAGNOSIS — E11.51 TYPE 2 DIABETES MELLITUS WITH DIABETIC PERIPHERAL ANGIOPATHY WITHOUT GANGRENE, UNSPECIFIED WHETHER LONG TERM INSULIN USE (MULTI): ICD-10-CM

## 2025-01-08 DIAGNOSIS — E55.9 VITAMIN D DEFICIENCY: ICD-10-CM

## 2025-01-08 PROCEDURE — 1036F TOBACCO NON-USER: CPT | Performed by: INTERNAL MEDICINE

## 2025-01-08 PROCEDURE — 1160F RVW MEDS BY RX/DR IN RCRD: CPT | Performed by: INTERNAL MEDICINE

## 2025-01-08 PROCEDURE — 3078F DIAST BP <80 MM HG: CPT | Performed by: INTERNAL MEDICINE

## 2025-01-08 PROCEDURE — 1159F MED LIST DOCD IN RCRD: CPT | Performed by: INTERNAL MEDICINE

## 2025-01-08 PROCEDURE — 3075F SYST BP GE 130 - 139MM HG: CPT | Performed by: INTERNAL MEDICINE

## 2025-01-08 PROCEDURE — 1157F ADVNC CARE PLAN IN RCRD: CPT | Performed by: INTERNAL MEDICINE

## 2025-01-08 PROCEDURE — 1158F ADVNC CARE PLAN TLK DOCD: CPT | Performed by: INTERNAL MEDICINE

## 2025-01-08 PROCEDURE — 99215 OFFICE O/P EST HI 40 MIN: CPT | Performed by: INTERNAL MEDICINE

## 2025-01-08 PROCEDURE — 1123F ACP DISCUSS/DSCN MKR DOCD: CPT | Performed by: INTERNAL MEDICINE

## 2025-01-08 PROCEDURE — G2211 COMPLEX E/M VISIT ADD ON: HCPCS | Performed by: INTERNAL MEDICINE

## 2025-01-08 RX ORDER — AMOXICILLIN 500 MG/1
500 CAPSULE ORAL 2 TIMES DAILY
Qty: 14 CAPSULE | Refills: 2 | Status: SHIPPED | OUTPATIENT
Start: 2025-01-08 | End: 2025-01-15

## 2025-01-09 ENCOUNTER — HOSPITAL ENCOUNTER (OUTPATIENT)
Dept: RADIOLOGY | Facility: CLINIC | Age: 75
Discharge: HOME | End: 2025-01-09
Payer: MEDICARE

## 2025-01-09 ENCOUNTER — LAB (OUTPATIENT)
Dept: LAB | Facility: LAB | Age: 75
End: 2025-01-09
Payer: MEDICARE

## 2025-01-09 DIAGNOSIS — M54.9 BACK PAIN, UNSPECIFIED BACK LOCATION, UNSPECIFIED BACK PAIN LATERALITY, UNSPECIFIED CHRONICITY: ICD-10-CM

## 2025-01-09 DIAGNOSIS — Z00.00 HEALTHCARE MAINTENANCE: ICD-10-CM

## 2025-01-09 DIAGNOSIS — Z91.89 RISK OF BECOMING OVERWEIGHT: ICD-10-CM

## 2025-01-09 DIAGNOSIS — E55.9 VITAMIN D DEFICIENCY: ICD-10-CM

## 2025-01-09 LAB
25(OH)D3 SERPL-MCNC: 34 NG/ML (ref 30–100)
ALBUMIN SERPL BCP-MCNC: 4.6 G/DL (ref 3.4–5)
ALP SERPL-CCNC: 51 U/L (ref 33–136)
ALT SERPL W P-5'-P-CCNC: 38 U/L (ref 7–45)
ANION GAP SERPL CALC-SCNC: 13 MMOL/L (ref 10–20)
APPEARANCE UR: CLEAR
AST SERPL W P-5'-P-CCNC: 19 U/L (ref 9–39)
BASOPHILS # BLD AUTO: 0.04 X10*3/UL (ref 0–0.1)
BASOPHILS NFR BLD AUTO: 0.6 %
BILIRUB SERPL-MCNC: 0.5 MG/DL (ref 0–1.2)
BILIRUB UR STRIP.AUTO-MCNC: NEGATIVE MG/DL
BUN SERPL-MCNC: 11 MG/DL (ref 6–23)
CALCIUM SERPL-MCNC: 9.9 MG/DL (ref 8.6–10.6)
CHLORIDE SERPL-SCNC: 104 MMOL/L (ref 98–107)
CHOLEST SERPL-MCNC: 148 MG/DL (ref 0–199)
CHOLESTEROL/HDL RATIO: 3.2
CO2 SERPL-SCNC: 27 MMOL/L (ref 21–32)
COLOR UR: ABNORMAL
CREAT SERPL-MCNC: 0.61 MG/DL (ref 0.5–1.05)
EGFRCR SERPLBLD CKD-EPI 2021: >90 ML/MIN/1.73M*2
EOSINOPHIL # BLD AUTO: 0.23 X10*3/UL (ref 0–0.4)
EOSINOPHIL NFR BLD AUTO: 3.2 %
ERYTHROCYTE [DISTWIDTH] IN BLOOD BY AUTOMATED COUNT: 13.7 % (ref 11.5–14.5)
EST. AVERAGE GLUCOSE BLD GHB EST-MCNC: 192 MG/DL
GLUCOSE SERPL-MCNC: 137 MG/DL (ref 74–99)
GLUCOSE UR STRIP.AUTO-MCNC: ABNORMAL MG/DL
HBA1C MFR BLD: 8.3 %
HCT VFR BLD AUTO: 44.9 % (ref 36–46)
HDLC SERPL-MCNC: 46 MG/DL
HGB BLD-MCNC: 14.5 G/DL (ref 12–16)
IMM GRANULOCYTES # BLD AUTO: 0.03 X10*3/UL (ref 0–0.5)
IMM GRANULOCYTES NFR BLD AUTO: 0.4 % (ref 0–0.9)
KETONES UR STRIP.AUTO-MCNC: NEGATIVE MG/DL
LDLC SERPL CALC-MCNC: 77 MG/DL
LEUKOCYTE ESTERASE UR QL STRIP.AUTO: NEGATIVE
LYMPHOCYTES # BLD AUTO: 2.15 X10*3/UL (ref 0.8–3)
LYMPHOCYTES NFR BLD AUTO: 30.3 %
MCH RBC QN AUTO: 27.9 PG (ref 26–34)
MCHC RBC AUTO-ENTMCNC: 32.3 G/DL (ref 32–36)
MCV RBC AUTO: 87 FL (ref 80–100)
MONOCYTES # BLD AUTO: 0.55 X10*3/UL (ref 0.05–0.8)
MONOCYTES NFR BLD AUTO: 7.8 %
NEUTROPHILS # BLD AUTO: 4.09 X10*3/UL (ref 1.6–5.5)
NEUTROPHILS NFR BLD AUTO: 57.7 %
NITRITE UR QL STRIP.AUTO: NEGATIVE
NON HDL CHOLESTEROL: 102 MG/DL (ref 0–149)
NRBC BLD-RTO: 0 /100 WBCS (ref 0–0)
PH UR STRIP.AUTO: 5 [PH]
PLATELET # BLD AUTO: 281 X10*3/UL (ref 150–450)
POTASSIUM SERPL-SCNC: 3.9 MMOL/L (ref 3.5–5.3)
PROT SERPL-MCNC: 7 G/DL (ref 6.4–8.2)
PROT UR STRIP.AUTO-MCNC: NEGATIVE MG/DL
RBC # BLD AUTO: 5.19 X10*6/UL (ref 4–5.2)
RBC # UR STRIP.AUTO: NEGATIVE /UL
SODIUM SERPL-SCNC: 140 MMOL/L (ref 136–145)
SP GR UR STRIP.AUTO: 1.03
T4 FREE SERPL-MCNC: 1.51 NG/DL (ref 0.78–1.48)
TRIGL SERPL-MCNC: 125 MG/DL (ref 0–149)
TSH SERPL-ACNC: 0.11 MIU/L (ref 0.44–3.98)
UROBILINOGEN UR STRIP.AUTO-MCNC: NORMAL MG/DL
VLDL: 25 MG/DL (ref 0–40)
WBC # BLD AUTO: 7.1 X10*3/UL (ref 4.4–11.3)

## 2025-01-09 PROCEDURE — 84443 ASSAY THYROID STIM HORMONE: CPT

## 2025-01-09 PROCEDURE — 82306 VITAMIN D 25 HYDROXY: CPT

## 2025-01-09 PROCEDURE — 87086 URINE CULTURE/COLONY COUNT: CPT

## 2025-01-09 PROCEDURE — 85025 COMPLETE CBC W/AUTO DIFF WBC: CPT

## 2025-01-09 PROCEDURE — 72110 X-RAY EXAM L-2 SPINE 4/>VWS: CPT

## 2025-01-09 PROCEDURE — 72072 X-RAY EXAM THORAC SPINE 3VWS: CPT

## 2025-01-09 PROCEDURE — 81003 URINALYSIS AUTO W/O SCOPE: CPT

## 2025-01-09 PROCEDURE — 83036 HEMOGLOBIN GLYCOSYLATED A1C: CPT

## 2025-01-09 PROCEDURE — 84439 ASSAY OF FREE THYROXINE: CPT

## 2025-01-09 PROCEDURE — 80061 LIPID PANEL: CPT

## 2025-01-09 PROCEDURE — 80053 COMPREHEN METABOLIC PANEL: CPT

## 2025-01-10 ENCOUNTER — HOSPITAL ENCOUNTER (OUTPATIENT)
Dept: RADIOLOGY | Facility: CLINIC | Age: 75
Discharge: HOME | End: 2025-01-10
Payer: MEDICARE

## 2025-01-10 DIAGNOSIS — R10.84 GENERALIZED ABDOMINAL PAIN: ICD-10-CM

## 2025-01-10 LAB — BACTERIA UR CULT: NORMAL

## 2025-01-10 PROCEDURE — 2550000001 HC RX 255 CONTRASTS: Performed by: INTERNAL MEDICINE

## 2025-01-10 PROCEDURE — 74177 CT ABD & PELVIS W/CONTRAST: CPT

## 2025-01-10 RX ADMIN — IOHEXOL 70 ML: 350 INJECTION, SOLUTION INTRAVENOUS at 09:09

## 2025-01-16 ENCOUNTER — TELEPHONE (OUTPATIENT)
Dept: CARDIOLOGY | Facility: HOSPITAL | Age: 75
End: 2025-01-16
Payer: MEDICARE

## 2025-01-16 NOTE — TELEPHONE ENCOUNTER
Patient had blood work done by her primary and wants you to take a look at it. She stated that her creatinine went from .80 to .61 and she is worried the spironolactone is effecting her kidneys in a negative way?

## 2025-01-16 NOTE — TELEPHONE ENCOUNTER
Spoke to patient on phone. Explained to her kidney function has improved and should continue spironolactone.

## 2025-01-24 DIAGNOSIS — E11.9 TYPE 2 DIABETES MELLITUS WITHOUT COMPLICATION, UNSPECIFIED WHETHER LONG TERM INSULIN USE (MULTI): Primary | ICD-10-CM

## 2025-01-24 DIAGNOSIS — E03.9 HYPOTHYROIDISM, UNSPECIFIED TYPE: ICD-10-CM

## 2025-01-24 RX ORDER — LEVOTHYROXINE SODIUM 88 UG/1
88 TABLET ORAL DAILY
Qty: 90 TABLET | Refills: 1 | Status: SHIPPED | OUTPATIENT
Start: 2025-01-24 | End: 2025-04-24

## 2025-01-27 DIAGNOSIS — M54.89 MIDLINE BACK PAIN, UNSPECIFIED BACK LOCATION, UNSPECIFIED CHRONICITY: Primary | ICD-10-CM

## 2025-01-29 ENCOUNTER — TELEPHONE (OUTPATIENT)
Dept: PHARMACY | Facility: HOSPITAL | Age: 75
End: 2025-01-29
Payer: MEDICARE

## 2025-01-29 NOTE — TELEPHONE ENCOUNTER
Called patient and left message regarding Clinical Pharmacy Program and appointment. Provided information to call back clinical pharmacist directly.    Edgar De Jesus, PharmD  Clinical Pharmacy Specialist, Primary Care  [phone]

## 2025-02-04 ENCOUNTER — APPOINTMENT (OUTPATIENT)
Dept: PRIMARY CARE | Facility: CLINIC | Age: 75
End: 2025-02-04
Payer: MEDICARE

## 2025-02-04 VITALS — DIASTOLIC BLOOD PRESSURE: 62 MMHG | SYSTOLIC BLOOD PRESSURE: 128 MMHG | BODY MASS INDEX: 23.34 KG/M2 | WEIGHT: 149 LBS

## 2025-02-04 DIAGNOSIS — B00.2 ORAL HERPES: ICD-10-CM

## 2025-02-04 DIAGNOSIS — E78.2 MIXED HYPERLIPIDEMIA: ICD-10-CM

## 2025-02-04 DIAGNOSIS — Z12.31 VISIT FOR SCREENING MAMMOGRAM: Primary | ICD-10-CM

## 2025-02-04 DIAGNOSIS — E11.9 TYPE 2 DIABETES MELLITUS WITHOUT COMPLICATION, UNSPECIFIED WHETHER LONG TERM INSULIN USE (MULTI): ICD-10-CM

## 2025-02-04 DIAGNOSIS — B96.89 ACUTE BACTERIAL SINUSITIS: ICD-10-CM

## 2025-02-04 DIAGNOSIS — J01.90 ACUTE BACTERIAL SINUSITIS: ICD-10-CM

## 2025-02-04 DIAGNOSIS — Z00.00 WELLNESS EXAMINATION: ICD-10-CM

## 2025-02-04 PROCEDURE — 3078F DIAST BP <80 MM HG: CPT | Performed by: INTERNAL MEDICINE

## 2025-02-04 PROCEDURE — 1157F ADVNC CARE PLAN IN RCRD: CPT | Performed by: INTERNAL MEDICINE

## 2025-02-04 PROCEDURE — 1170F FXNL STATUS ASSESSED: CPT | Performed by: INTERNAL MEDICINE

## 2025-02-04 PROCEDURE — 1036F TOBACCO NON-USER: CPT | Performed by: INTERNAL MEDICINE

## 2025-02-04 PROCEDURE — 1159F MED LIST DOCD IN RCRD: CPT | Performed by: INTERNAL MEDICINE

## 2025-02-04 PROCEDURE — 1160F RVW MEDS BY RX/DR IN RCRD: CPT | Performed by: INTERNAL MEDICINE

## 2025-02-04 PROCEDURE — 99214 OFFICE O/P EST MOD 30 MIN: CPT | Performed by: INTERNAL MEDICINE

## 2025-02-04 PROCEDURE — 1123F ACP DISCUSS/DSCN MKR DOCD: CPT | Performed by: INTERNAL MEDICINE

## 2025-02-04 PROCEDURE — 1158F ADVNC CARE PLAN TLK DOCD: CPT | Performed by: INTERNAL MEDICINE

## 2025-02-04 PROCEDURE — 3048F LDL-C <100 MG/DL: CPT | Performed by: INTERNAL MEDICINE

## 2025-02-04 PROCEDURE — 3052F HG A1C>EQUAL 8.0%<EQUAL 9.0%: CPT | Performed by: INTERNAL MEDICINE

## 2025-02-04 PROCEDURE — 3074F SYST BP LT 130 MM HG: CPT | Performed by: INTERNAL MEDICINE

## 2025-02-04 PROCEDURE — G0439 PPPS, SUBSEQ VISIT: HCPCS | Performed by: INTERNAL MEDICINE

## 2025-02-04 RX ORDER — AZITHROMYCIN 250 MG/1
TABLET, FILM COATED ORAL
Qty: 6 TABLET | Refills: 2 | Status: SHIPPED | OUTPATIENT
Start: 2025-02-04 | End: 2025-02-09

## 2025-02-04 RX ORDER — ACYCLOVIR 50 MG/G
1 OINTMENT TOPICAL
Qty: 30 G | Refills: 3 | Status: SHIPPED | OUTPATIENT
Start: 2025-02-04 | End: 2025-02-08

## 2025-02-04 RX ORDER — VALACYCLOVIR HYDROCHLORIDE 1 G/1
1000 TABLET, FILM COATED ORAL 2 TIMES DAILY
Qty: 4 TABLET | Refills: 5 | Status: SHIPPED | OUTPATIENT
Start: 2025-02-04 | End: 2025-02-11

## 2025-02-04 ASSESSMENT — ACTIVITIES OF DAILY LIVING (ADL)
TAKING_MEDICATION: INDEPENDENT
DOING_HOUSEWORK: INDEPENDENT
MANAGING_FINANCES: INDEPENDENT
DRESSING: INDEPENDENT
BATHING: INDEPENDENT
GROCERY_SHOPPING: INDEPENDENT

## 2025-02-04 ASSESSMENT — PATIENT HEALTH QUESTIONNAIRE - PHQ9
2. FEELING DOWN, DEPRESSED OR HOPELESS: NOT AT ALL
SUM OF ALL RESPONSES TO PHQ9 QUESTIONS 1 AND 2: 0
2. FEELING DOWN, DEPRESSED OR HOPELESS: NOT AT ALL
1. LITTLE INTEREST OR PLEASURE IN DOING THINGS: NOT AT ALL
SUM OF ALL RESPONSES TO PHQ9 QUESTIONS 1 AND 2: 0
1. LITTLE INTEREST OR PLEASURE IN DOING THINGS: NOT AT ALL

## 2025-02-04 NOTE — PROGRESS NOTES
Subjective   Patient ID: Mare Ching is a 74 y.o. female who presents for Follow-up, Med Refill, and Medicare Annual Wellness Visit Subsequent.  HPI        female history COPD tobacco abuse diabetes hypothyroidism muscle cramps moderate CAD. UTI anxiety unstable angina s/p cath 4/2023 PCI, cath 10/2023 .889 ffr LAD lesion, right carotid stenosis, chronic back pain moderate arthritis thoracic a lumbar  Status post right CEA September 5, 2024        Patient has 2 weeks of a cough somewhat productive getting better over time grade 2 out of 10 better after getting Z-Héctor and bronchodilators worse without it  She has a cold sore on the bottom part of her right inferior nare superior to lip better in the past with acyclovir and Valtrex  Denies chest pain dyspnea nausea vomiting fever chills otalgia otorrhea odynophagia dysphagia      Health Maintenance:      Colonoscopy: 2019      Mammogram: 2024      Pelvic/Pap:      Low dose chest CT:      Aorta duplex:      Optho:      Podiatry:        Vaccines:      Refer to Epic Vaccination Log        ROS:      General: denies fever/chills/weight loss      Head: denies HA/trauma/masses/dizziness      Eyes: denies vision change/loss of vision/blurry vision/diplopia/eye pain      Ears: denies hearing loss/tinnitus/otalgia/otorrhea      Nose: denies nasal drainage/anosmia      Throat: S denies dysphagia/odynophagia      Lymphatics: denies lymph node swelling      Breast: denies masses/discharge/dimpling/skin changes      Cardiac: denies CP/palpitations/orthopnea/PND      Pulmonary: Productive cough denies dyspnea/cough/wheezing      GI: Severe abdominal pain denies abd pain/n/v/diarrhea/melena/hematochezia/hematemesis      : denies dysuria/hematuria/change frequency      Genital: denies genital discharge/lesions      Skin: Cold sore above her right lip that is scabbed and somewhat painful presently denies rashes/lesions/masses      MSK: Thoracic back pain parathoracic muscles  "worse with sitting better at present;; occasionally leg swelling at night seems worse with using Norvasc better at present denies weakness/swelling/edema/gait imbalance/pain      Neuro: denies paresthesias/seizures/dysarthria      Psych: Severe claustrophobia with MRI denies depression/anxiety/suicidal or homicidal ideations            Objective   /62   Wt 67.6 kg (149 lb)   BMI 23.34 kg/m²      Physical Exam:     General: AO3, NAD     Head: atraumatic/NC     Eyes: EOMI/PERRLA. Negative APD     Ears: TM pearly gray, EAC clear. No lesions or erythema     Nose: symmetric nares, no discharge     Throat: Right anterior triangle oblique scar clean dry intact about 7 cm trachea midline, uvula midline pink mucosa. No thyromegaly     Lymphatics: no cervical/supraclavicular/ant or posterior cervical adenopathy/axillary/inguinal adenopathy     Breast: not examined     Chest: no deformity or tenderness to palpation     Pulm: CTA b/l, no wheeze/rhonchi/rales. nonlabored     Cardiac: RRR +s1s2, no m/r/g.      GI:  soft, /ND. Normoactive Bsx4. No rebound/guarding.  Negative McBurney negative Rovsing negative Ventura's     Rectal: not examined     Genital: not examined     MSK:  5/5 strength UE LE. No edema/clubbing/cyanosis     Skin: Right inferior to nare and superior to vermilion border upper lip 1 mm scab open ulceration no rashes/lesions     Vascular: 1+ palp DP PT radials b/l. Negative carotid bruit     Neuro: CNII-XII intact. No focal deficits. Reflexes 2/4 brachioradialis bicep tricep patellar achilles. Finger to nose intact.     Psych: appropriate mood/affect                    No results found for: \"BMPR1A\", \"CBCDIF\"    I offered another option for diabetes control but his Trulicity she deferred this option at this time states she only wants to try to do it on her own at this point    I recommended echocardiogram as well as venous reflux studies lower extremity given chronic leg swelling she refused states she is " going to talk to cardiology to review this first    Patient deferred physical therapy referral for parathoracic muscle type pain  Assessment/Plan   Diagnoses and all orders for this visit:  Visit for screening mammogram  -     BI mammo bilateral screening tomosynthesis; Future  Type 2 diabetes mellitus without complication, unspecified whether long term insulin use (Multi)  -     empagliflozin (Jardiance) 25 mg; Take 1 tablet (25 mg) by mouth once daily.  Oral herpes  -     acyclovir (Zovirax) 5 % ointment; Apply 1 Application topically 5 times a day for 4 days. Apply to affected area 5 times daily for 4 days.  -     valACYclovir (Valtrex) 1 gram tablet; Take 1 tablet (1,000 mg) by mouth 2 times a day for 7 days. Take 2 tabs (2000 mg) 2 times a days for 1 day.  Acute bacterial sinusitis  -     azithromycin (Zithromax) 250 mg tablet; Take 2 tablets (500 mg) by mouth once daily for 1 day, THEN 1 tablet (250 mg) once daily for 4 days. Take 2 tabs (500 mg) by mouth today, than 1 daily for 4 days..  Mixed hyperlipidemia  Wellness examination    Go to the ER for any severe persistent abdominal pain  Go to the ER for chest pain shortness of breath  Liquid diet soup  Avoid alcohol  Tylenol as needed for pain    I recommend following up with endocrinology as ordered  Goal A1c less than 7.5%    Call and follow-up with medical spine as ordered    Call and follow-up with vascular recommendations noted Plavix rosuvastatin and Zetia follow-up in 2 months carotid duplex    Follow-up with your new cardiologist recommendations noted lipid panel 3 months add Norvasc 5 mg a day Zetia 10 mg a day follow-up in 4 months as you stated appointment coming up November 2024        Go to ER for any worsening head pain headaches or other concerning symptoms    Call and follow-up with pulmonary medicine    Call follow-up with ENT    Call to complete colonoscopy      Screening blood work due July 2026    Thank you for making appointment today  Mare    Please follow-up 3 months    Griffin Baltazar DO, MARY Baltazar DO

## 2025-02-20 ENCOUNTER — OFFICE VISIT (OUTPATIENT)
Dept: CARDIOLOGY | Facility: HOSPITAL | Age: 75
End: 2025-02-20
Payer: MEDICARE

## 2025-02-20 VITALS
HEIGHT: 67 IN | OXYGEN SATURATION: 96 % | RESPIRATION RATE: 16 BRPM | HEART RATE: 68 BPM | DIASTOLIC BLOOD PRESSURE: 69 MMHG | WEIGHT: 148.9 LBS | BODY MASS INDEX: 23.37 KG/M2 | SYSTOLIC BLOOD PRESSURE: 123 MMHG

## 2025-02-20 DIAGNOSIS — I10 ESSENTIAL HYPERTENSION: Primary | ICD-10-CM

## 2025-02-20 DIAGNOSIS — E78.00 PURE HYPERCHOLESTEROLEMIA: ICD-10-CM

## 2025-02-20 DIAGNOSIS — I25.10 CAD IN NATIVE ARTERY: ICD-10-CM

## 2025-02-20 DIAGNOSIS — I65.23 BILATERAL CAROTID ARTERY STENOSIS: ICD-10-CM

## 2025-02-20 PROCEDURE — 1036F TOBACCO NON-USER: CPT | Performed by: NURSE PRACTITIONER

## 2025-02-20 PROCEDURE — 3008F BODY MASS INDEX DOCD: CPT | Performed by: NURSE PRACTITIONER

## 2025-02-20 PROCEDURE — 3074F SYST BP LT 130 MM HG: CPT | Performed by: NURSE PRACTITIONER

## 2025-02-20 PROCEDURE — G2211 COMPLEX E/M VISIT ADD ON: HCPCS | Performed by: NURSE PRACTITIONER

## 2025-02-20 PROCEDURE — 1159F MED LIST DOCD IN RCRD: CPT | Performed by: NURSE PRACTITIONER

## 2025-02-20 PROCEDURE — 99214 OFFICE O/P EST MOD 30 MIN: CPT | Performed by: NURSE PRACTITIONER

## 2025-02-20 PROCEDURE — 3078F DIAST BP <80 MM HG: CPT | Performed by: NURSE PRACTITIONER

## 2025-02-20 PROCEDURE — 1157F ADVNC CARE PLAN IN RCRD: CPT | Performed by: NURSE PRACTITIONER

## 2025-02-20 PROCEDURE — 3052F HG A1C>EQUAL 8.0%<EQUAL 9.0%: CPT | Performed by: NURSE PRACTITIONER

## 2025-02-20 PROCEDURE — 3048F LDL-C <100 MG/DL: CPT | Performed by: NURSE PRACTITIONER

## 2025-02-20 NOTE — PROGRESS NOTES
Primary Care Physician: Griffin Baltazar DO  Date of Visit: 02/20/2025 10:40 AM EST  Location of visit: [facility]     Chief Complaint:   Chief Complaint   Patient presents with    Follow-up        HPI / Summary:   Mare Ching is a 74 y.o. female presents for followup.Seen in collaboration with Dr. Torres. She has no particular complaints. She is able to walk the stores without chest pain or dyspnea. She has an occasional stable chronic dyspnea on exertion with walking prolonged distances that is more sporadic.  She was unable to tolerate Ezetimibe due to myalgias. The patient denies chest pain, dyspnea, palpitations, lightheadedness, syncope, orthopnea, paroxysmal nocturnal dyspnea, or bleeding problems.     Her endocrinologist started Prandin.          Past Medical History:  Past Medical History:   Diagnosis Date    Abdominal bloating 08/03/2023    Acute bacterial bronchitis 08/03/2023    Acute UTI 08/03/2023    Angina pectoris     resolved per cardiology last note    Body mass index (BMI) 25.0-25.9, adult     BMI 25.0-25.9,adult    Body mass index (BMI) 26.0-26.9, adult     BMI 26.0-26.9,adult    Candidal vaginitis 08/03/2023    Carotid artery disease (CMS-[facility])     B/L Stenosis    Chest pain, atypical 08/03/2023    Chest pain, midsternal 08/03/2023    Chronic obstructive pulmonary disease, unspecified 07/15/2021    COPD (chronic obstructive pulmonary disease)    Coronary artery disease     Delayed emergence from general anesthesia     Dysuria 08/03/2023    Flank pain 08/03/2023    GERD (gastroesophageal reflux disease)     Hip pain, right 08/03/2023    History of substance dependence (Multi) 04/04/2024    Hyperlipidemia, unspecified 10/03/2022    Hyperlipidemia    Hypertension     Hypothyroidism     Increased urinary frequency 08/03/2023    Other specified postprocedural states 04/29/2015    H/O colonoscopy    Personal history of other diseases of the respiratory system     History of asthma    PONV  (postoperative nausea and vomiting)     Proteinuria 2023    Recurrent urinary tract infection 2023    Sialoadenitis 2023    SOB (shortness of breath) 2023    Type 2 diabetes mellitus without complications (Multi) 10/11/2022    Diabetes mellitus    Urinary urgency 2023        Past Surgical History:  Past Surgical History:   Procedure Laterality Date    CARDIAC CATHETERIZATION N/A 10/23/2023    Procedure: Left Heart Cath, With LV (98752);  Surgeon: Manpreet Garg MD;  Location: [facility] Cardiac Cath Lab;  Service: Cardiovascular;  Laterality: N/A;     SECTION, CLASSIC      x2    CORONARY ANGIOPLASTY WITH STENT PLACEMENT      LAD    HYSTERECTOMY  2015    Hysterectomy    SALIVARY GLAND SURGERY Left     left parotidectomy    TUBAL LIGATION Bilateral           Social History:   reports that she quit smoking about 25 years ago. Her smoking use included cigarettes. She started smoking about 55 years ago. She has a 60 pack-year smoking history. She has never used smokeless tobacco. She reports current alcohol use. She reports that she does not use drugs.     Family History:  family history includes Cancer in her father and mother; Diabetes in her mother; Pancreatic cancer in her mother; Stroke in her father; cardiac disorder in an other family member; urinary cancer in her father.      Allergies:  Allergies   Allergen Reactions    Adhesive Tape-Silicones Other     Patient gets blisters from surgical tape       Outpatient Medications:  Current Outpatient Medications   Medication Instructions    blood sugar diagnostic (Accu-Chek Guide test strips) strip USE TO TEST BLOOD SUGAR 3 TIMES DAILY.    cholecalciferol (Vitamin D-3) 25 MCG (1000 UT) capsule TAKE 1 CAPSULE (25 MCG) BY MOUTH ONCE DAILY.  NEEDS APPOINTMENT    clopidogrel (PLAVIX) 75 mg, oral, Daily    empagliflozin (JARDIANCE) 25 mg, oral, Daily    ipratropium-albuteroL (Duo-Neb) 0.5-2.5 mg/3 mL nebulizer solution     lancets  "misc Use 3 times daily as directed    metoprolol succinate XL (Toprol-XL) 25 mg 24 hr tablet TAKE ONE TABLET BY MOUTH ONCE DAILY. DO NOT CRUSH OR CHEW.    rosuvastatin (CRESTOR) 40 mg, oral, Daily    Spiriva Respimat 2.5 mcg/actuation inhaler 1 puff, 2 times daily    spironolactone (ALDACTONE) 12.5 mg, oral, Daily    Symbicort 160-4.5 mcg/actuation inhaler 2 puffs, 2 times daily RT    Synthroid 88 mcg, oral, Daily, Take on an empty stomach at the same time each day, either 30 to 60 minutes prior to breakfast    Ventolin HFA 90 mcg/actuation inhaler Inhales as needed       Physical Exam:  Vitals:    02/20/25 1033   BP: 123/69   BP Location: Left arm   Patient Position: Sitting   BP Cuff Size: Adult   Pulse: 68   Resp: 16   SpO2: 96%   Weight: 67.5 kg (148 lb 14.4 oz)   Height: 1.702 m (5' 7\")     Wt Readings from Last 5 Encounters:   02/20/25 67.5 kg (148 lb 14.4 oz)   02/04/25 67.6 kg (149 lb)   01/08/25 68.9 kg (152 lb)   12/02/24 70.3 kg (155 lb)   11/21/24 69.9 kg (154 lb)     Body mass index is 23.32 kg/m².   GENERAL: alert, cooperative, pleasant, in no acute distress  SKIN: warm and dry  NECK: Normal JVD, negative HJR  CARDIAC: Regular rate and rhythm with no rubs, murmurs, or gallops  CHEST: Normal respiratory efforts, lungs clear to auscultation bilaterally.  ABDOMEN: soft, nontender, nondistended  EXTREMITIES: no edema, +2 palpable RP bilaterally       Last Labs:  Recent Labs     01/09/25  0956 09/06/24  0949 08/28/24  1038   WBC 7.1 11.4* 6.9   HGB 14.5 13.3 14.3   HCT 44.9 42.5 43.7    254 222   MCV 87 88 87     Recent Labs     01/09/25  0956 12/16/24  1022 09/06/24  0949    141 138   K 3.9 5.1 3.7    103 104   BUN 11 17 14   CREATININE 0.61 0.80 0.80     CMP -  Lab Results   Component Value Date    CALCIUM 9.9 01/09/2025    PHOS 2.7 09/06/2024    PROT 7.0 01/09/2025    ALBUMIN 4.6 01/09/2025    AST 19 01/09/2025    ALT 38 01/09/2025    ALKPHOS 51 01/09/2025    BILITOT 0.5 01/09/2025 "       LIPID PANEL -   Lab Results   Component Value Date    CHOL 148 01/09/2025    HDL 46.0 01/09/2025    LDLF 73 03/01/2023    TRIG 125 01/09/2025   LDL 77 1/9/25    Lab Results   Component Value Date    HGBA1C 8.3 (H) 01/09/2025       Last Cardiology Tests:  ECG:  Reviewed EKG from 7/17/25- normal sinus rhythm HR 68, LAD, RBBB, possible prior septal infarct    Echo:  Echocardiogram April 6, 2023  CONCLUSIONS:  1. Left ventricular systolic function is normal with a 60-65% estimated ejection fraction.  2. Mild mitral regurgitation.        Cath:  Cardiac catheterization October 23, 2023  CONCLUSIONS:   1. Moderate ostial and mild mid LAD disease with an RFR of 0.89.   2. Patent proximal to mid LAD stent.   3. Mild circumflex and RCA disease.   4. Elevated left heart filling pressures.   5. Normal left ventricular systolic function.  In the distal portion of the stent, RFR was 0.92, in the proximal portion of stent, RFR was 0.95, in the left main, RFR was 0.98.      Cardiac catheterization April 17, 2023  Coronary Lesion Summary:  Vessel   Stenosis         Vessel Segment  LAD    90% stenosis            mid  LAD    60% stenosis      proximal to mid  LAD    70% stenosis distal third of the distal  CONCLUSIONS:  1. Successful IVUS-guided PCI and rotational atherectomy of LAD with 3.0 x 38 mm Resolute Marshall Buras ZES post-dilated with 3.5 mm and 4.0 mm NC balloons.  2. Elevated left-sided filling pressure (LVEDP 18 mmHg) and no gradient on pull-back.  3. Angiographically mild CAD in the LCX and RCA suitable for medical therapy.           Stress Test:  Lexiscan nuclear stress test March 31, 2022  IMPRESSION:  1. Normal stress myocardial perfusion imaging in response to  pharmacologic stress.  2. Well-maintained left ventricular function.  80%        Cardiac Imaging:  Carotid ultrasound May 13, 2024  CONCLUSIONS:  Right Carotid: Findings are consistent with greater than 70% stenosis of the right proximal internal carotid  artery. Turbulent flow seen by color Doppler. Right external carotid artery appears patent with no evidence of stenosis. The right vertebral artery is patent with antegrade flow. No evidence of hemodynamically significant stenosis in the right subclavian artery.  Left Carotid: Findings are consistent with 50 to 69% stenosis of the left proximal internal carotid artery. Left external carotid artery appears patent with no evidence of stenosis. The left vertebral artery is patent with antegrade flow. No evidence of hemodynamically significant stenosis in the left subclavian artery.     MRA of the head and neck May 1, 2024  IMPRESSION:  MRI Brain:  1. There is no evidence of acute hemorrhage, mass lesion or acute  infarction..      MRA HEAD/NECK :  1. Near-complete loss of flow related signal consistent with severe  stenosis in the most proximal right ICA with reconstitution distally.  The remainder of the right ICA vessel remains diffusely diminutive in  diameter relative to the left.  2. High-grade stenosis of the proximal cervical left ICA with  expected flow signal distally.     CT abdomen and pelvis January 2022  VESSELS:  There is no aneurysmal dilatation of the abdominal aorta. There are  moderate atherosclerotic calcifications of the abdominal aorta and  its branches. Limited evaluation of the IVC on noncontrast imaging.     CT cardiac scoring June 2019  The imaged ascending, and descending thoracic aorta are normal in  course, caliber, and contour except for mild scattered  calcifications. The aortic arch is not  included in this study.     CORONARY ANATOMY:     Origins:Normal coronary artery origins.     Calcium Score (Agatston):  LM:   0  LAD:  476  LCx:  0  RCA: 113  ----------------------  Total: 589       Assessment/Plan   Mare was seen today for follow-up.  Diagnoses and all orders for this visit:  Essential hypertension (Primary)  -     Basic metabolic panel; Future  CAD in native artery  -      Referral to Clinical Pharmacy; Future  Pure hypercholesterolemia  -     Referral to Clinical Pharmacy; Future  Bilateral carotid artery stenosis  -     Referral to Clinical Pharmacy; Future          In summary Ms. Ching is a pleasant 74-year-old white female with a past medical history significant for coronary artery disease with a CT calcium score of 589 in 2019 with subsequent PCI to the LAD in the setting of unstable angina with preserved LV function, bilateral carotid artery disease, type II non-insulin-requiring diabetes, hypertension, hyperlipidemia, and COPD.  She has residual moderate disease involving the ostium and mid to distal LAD.  Her RFR was borderline abnormal distally but nonischemic in the mid LAD. She has stable chronic dyspnea on exertion that is sporadic and likely is predominantly due to her underlying COPD.  She is euvolemic on exam. Her blood pressure is at goal. She was unable to tolerate Amlodipine due to lower extremity edema. She is tolerating Spironolactone. Recent lipid panel is not at goal. LDL goal is less than 55. I have placed clinical pharmacy referral for PCSK9 inhibitor or Nexletol. She was unable to tolerate Ezetimibe due to myalgias. I have ordered blood work to be done in 2 months for surveillance of renal function and potassium. She will continue all current cardiovascular medications. She will follow up as scheduled with Dr. Torres.                Orders:  No orders of the defined types were placed in this encounter.     Followup Appts:  Future Appointments   Date Time Provider Department Center   2/21/2025 10:15 AM PAR OPCTR MAMMO 1 PAROPCMAM PAR RAD   5/20/2025 11:00 AM Griffin Baltazar DO [id number] [place]   6/2/2025 10:30 AM AHU VASC 2 AHUVSC U Rad   6/2/2025 11:40 AM Chris Leonard MD PhD UVSCS [facility]   9/2/2025 11:20 AM Chris Torres MD [id number] [facility]           ____________________________________________________________  Brittany Monzon, APRN-CNP  [facility] [place]  [facility]

## 2025-02-20 NOTE — PATIENT INSTRUCTIONS
Continue current meds  Referral to clinical pharmacy for PCSK9( inhibitor) or Nexletol  Follow up as scheduled with Dr. Torres  Check blood work- BMP in 2 months

## 2025-02-21 ENCOUNTER — HOSPITAL ENCOUNTER (OUTPATIENT)
Dept: RADIOLOGY | Facility: CLINIC | Age: 75
Discharge: HOME | End: 2025-02-21
Payer: MEDICARE

## 2025-02-21 VITALS — WEIGHT: 148.9 LBS | BODY MASS INDEX: 23.37 KG/M2 | HEIGHT: 67 IN

## 2025-02-21 DIAGNOSIS — Z12.31 VISIT FOR SCREENING MAMMOGRAM: ICD-10-CM

## 2025-02-21 PROCEDURE — 77063 BREAST TOMOSYNTHESIS BI: CPT | Performed by: RADIOLOGY

## 2025-02-21 PROCEDURE — 77067 SCR MAMMO BI INCL CAD: CPT | Performed by: RADIOLOGY

## 2025-02-21 PROCEDURE — 77067 SCR MAMMO BI INCL CAD: CPT

## 2025-02-22 DIAGNOSIS — E11.9 TYPE 2 DIABETES MELLITUS WITHOUT COMPLICATION, WITHOUT LONG-TERM CURRENT USE OF INSULIN (MULTI): ICD-10-CM

## 2025-02-24 RX ORDER — BLOOD SUGAR DIAGNOSTIC
STRIP MISCELLANEOUS
Qty: 100 STRIP | Refills: 2 | Status: SHIPPED | OUTPATIENT
Start: 2025-02-24

## 2025-03-14 DIAGNOSIS — I10 ESSENTIAL HYPERTENSION: ICD-10-CM

## 2025-03-17 RX ORDER — SPIRONOLACTONE 25 MG/1
12.5 TABLET ORAL DAILY
Qty: 45 TABLET | Refills: 3 | Status: SHIPPED | OUTPATIENT
Start: 2025-03-17 | End: 2026-03-17

## 2025-04-03 DIAGNOSIS — E11.9 TYPE 2 DIABETES MELLITUS WITHOUT COMPLICATION, UNSPECIFIED WHETHER LONG TERM INSULIN USE: Primary | ICD-10-CM

## 2025-04-03 RX ORDER — DEXTROSE 4 G
TABLET,CHEWABLE ORAL
Qty: 1 EACH | Refills: 0 | Status: SHIPPED | OUTPATIENT
Start: 2025-04-03

## 2025-04-19 DIAGNOSIS — E11.9 TYPE 2 DIABETES MELLITUS WITHOUT COMPLICATION, UNSPECIFIED WHETHER LONG TERM INSULIN USE: ICD-10-CM

## 2025-04-21 DIAGNOSIS — I10 ESSENTIAL HYPERTENSION: ICD-10-CM

## 2025-04-21 RX ORDER — EMPAGLIFLOZIN 25 MG/1
25 TABLET, FILM COATED ORAL DAILY
Qty: 30 TABLET | Refills: 0 | Status: SHIPPED | OUTPATIENT
Start: 2025-04-21

## 2025-05-20 ENCOUNTER — APPOINTMENT (OUTPATIENT)
Dept: PRIMARY CARE | Facility: CLINIC | Age: 75
End: 2025-05-20
Payer: MEDICARE

## 2025-05-20 VITALS
WEIGHT: 160 LBS | BODY MASS INDEX: 25.11 KG/M2 | HEIGHT: 67 IN | SYSTOLIC BLOOD PRESSURE: 128 MMHG | DIASTOLIC BLOOD PRESSURE: 78 MMHG

## 2025-05-20 DIAGNOSIS — B37.0 THRUSH: ICD-10-CM

## 2025-05-20 DIAGNOSIS — K21.9 GASTROESOPHAGEAL REFLUX DISEASE, UNSPECIFIED WHETHER ESOPHAGITIS PRESENT: Primary | ICD-10-CM

## 2025-05-20 DIAGNOSIS — E03.9 HYPOTHYROIDISM, UNSPECIFIED TYPE: ICD-10-CM

## 2025-05-20 DIAGNOSIS — E11.9 TYPE 2 DIABETES MELLITUS WITHOUT COMPLICATION, UNSPECIFIED WHETHER LONG TERM INSULIN USE: ICD-10-CM

## 2025-05-20 DIAGNOSIS — K21.9 GASTROESOPHAGEAL REFLUX DISEASE, UNSPECIFIED WHETHER ESOPHAGITIS PRESENT: ICD-10-CM

## 2025-05-20 PROCEDURE — 3052F HG A1C>EQUAL 8.0%<EQUAL 9.0%: CPT | Performed by: INTERNAL MEDICINE

## 2025-05-20 PROCEDURE — 3008F BODY MASS INDEX DOCD: CPT | Performed by: INTERNAL MEDICINE

## 2025-05-20 PROCEDURE — 3074F SYST BP LT 130 MM HG: CPT | Performed by: INTERNAL MEDICINE

## 2025-05-20 PROCEDURE — 99214 OFFICE O/P EST MOD 30 MIN: CPT | Performed by: INTERNAL MEDICINE

## 2025-05-20 PROCEDURE — 3078F DIAST BP <80 MM HG: CPT | Performed by: INTERNAL MEDICINE

## 2025-05-20 PROCEDURE — 1159F MED LIST DOCD IN RCRD: CPT | Performed by: INTERNAL MEDICINE

## 2025-05-20 PROCEDURE — 3048F LDL-C <100 MG/DL: CPT | Performed by: INTERNAL MEDICINE

## 2025-05-20 PROCEDURE — G2211 COMPLEX E/M VISIT ADD ON: HCPCS | Performed by: INTERNAL MEDICINE

## 2025-05-20 PROCEDURE — 1036F TOBACCO NON-USER: CPT | Performed by: INTERNAL MEDICINE

## 2025-05-20 PROCEDURE — 1160F RVW MEDS BY RX/DR IN RCRD: CPT | Performed by: INTERNAL MEDICINE

## 2025-05-20 RX ORDER — NYSTATIN 100000 [USP'U]/ML
500000 SUSPENSION ORAL 4 TIMES DAILY
Qty: 280 ML | Refills: 1 | Status: SHIPPED | OUTPATIENT
Start: 2025-05-20 | End: 2025-05-20 | Stop reason: SDUPTHER

## 2025-05-20 RX ORDER — LEVOTHYROXINE SODIUM 88 UG/1
88 TABLET ORAL DAILY
Qty: 90 TABLET | Refills: 1 | Status: SHIPPED | OUTPATIENT
Start: 2025-05-20 | End: 2025-08-18

## 2025-05-20 RX ORDER — NYSTATIN 100000 [USP'U]/ML
500000 SUSPENSION ORAL 4 TIMES DAILY
Qty: 280 ML | Refills: 1 | Status: SHIPPED | OUTPATIENT
Start: 2025-05-20 | End: 2025-07-19

## 2025-05-20 RX ORDER — REPAGLINIDE 1 MG/1
TABLET ORAL
Start: 2025-05-20

## 2025-05-20 RX ORDER — PANTOPRAZOLE SODIUM 40 MG/1
40 TABLET, DELAYED RELEASE ORAL DAILY
Qty: 90 TABLET | Refills: 1 | Status: SHIPPED | OUTPATIENT
Start: 2025-05-20 | End: 2025-05-20 | Stop reason: SDUPTHER

## 2025-05-20 RX ORDER — PANTOPRAZOLE SODIUM 40 MG/1
40 TABLET, DELAYED RELEASE ORAL DAILY
Qty: 90 TABLET | Refills: 1 | Status: SHIPPED | OUTPATIENT
Start: 2025-05-20 | End: 2025-08-18

## 2025-05-20 NOTE — PROGRESS NOTES
Subjective   Patient ID: Mare Ching is a 74 y.o. female who presents for Follow-up.  HPI        female history COPD tobacco abuse diabetes hypothyroidism muscle cramps moderate CAD. UTI anxiety unstable angina s/p cath 4/2023 PCI, cath 10/2023 .889 ffr LAD lesion, right carotid stenosis, chronic back pain moderate arthritis thoracic a lumbar, chronic knee pain osteoarthritis  Status post right CEA September 5, 2024    Patient describes uncontrolled acidic reflux in the back of the throat for 2 weeks grade 3 nothing really makes better or worse seems worse after having some cherry juice  Has associated globus sensation  Has noticed some white material on the sides of her tongue the last 2 weeks as well in the past got better with nystatin  Denies abdominal pain nausea vomiting fever chills hematochezia melena hematemesis odynophagia dysphagia    Glucose 120 at home around there        Health Maintenance:      Colonoscopy: 2019      Mammogram: 2025      Pelvic/Pap:      Low dose chest CT:      Aorta duplex:      Optho:      Podiatry:        Vaccines:      Refer to Epic Vaccination Log        ROS:      General: denies fever/chills/weight loss      Head: denies HA/trauma/masses/dizziness      Eyes: denies vision change/loss of vision/blurry vision/diplopia/eye pain      Ears: denies hearing loss/tinnitus/otalgia/otorrhea      Nose: denies nasal drainage/anosmia      Throat: White cheesy material on the border of the tongue bilateral denies dysphagia/odynophagia      Lymphatics: denies lymph node swelling      Breast: denies masses/discharge/dimpling/skin changes      Cardiac: denies CP/palpitations/orthopnea/PND      Pulmonary: Productive cough denies dyspnea/cough/wheezing      GI: Acidic reflux in the back of the throat uncontrolled globus sensation denies abd pain/n/v/diarrhea/melena/hematochezia/hematemesis      : denies dysuria/hematuria/change frequency      Genital: denies genital discharge/lesions      " Skin:denies rashes/lesions/masses      MSK: Patient states she had knee imaging done x-rays MRIs through Dr. Fitch no information available on the chart;denies weakness/swelling/edema/gait imbalance/pain      Neuro: denies paresthesias/seizures/dysarthria      Psych: Severe claustrophobia with MRI denies depression/anxiety/suicidal or homicidal ideations            Objective   /78   Ht 1.702 m (5' 7\")   Wt 72.6 kg (160 lb)   BMI 25.06 kg/m²      Physical Exam:     General: AO3, NAD     Head: atraumatic/NC     Eyes: EOMI/PERRLA. Negative APD     Ears: TM pearly gray, EAC clear. No lesions or erythema     Nose: symmetric nares, no discharge     Throat: Mild white cheesy material in the lateral tongue bilateral right anterior triangle oblique scar clean dry intact about 7 cm trachea midline, uvula midline pink mucosa. No thyromegaly     Lymphatics: no cervical/supraclavicular/ant or posterior cervical adenopathy/axillary/inguinal adenopathy     Breast: not examined     Chest: no deformity or tenderness to palpation     Pulm: CTA b/l, no wheeze/rhonchi/rales. nonlabored     Cardiac: RRR +s1s2, no m/r/g.      GI:  soft, /ND. Normoactive Bsx4. No rebound/guarding.  Negative McBurney negative Rovsing negative Ventura's     Rectal: not examined     Genital: not examined     MSK:  5/5 strength UE LE. No edema/clubbing/cyanosis     Skin:  no rashes/lesions     Vascular: 1+ palp DP PT radials b/l. Negative carotid bruit     Neuro: CNII-XII intact. No focal deficits. Reflexes 2/4 brachioradialis bicep tricep patellar achilles. Finger to nose intact.     Psych: appropriate mood/affect                    No results found for: \"BMPR1A\", \"CBCDIF\"    I offered another option for diabetes control but his Trulicity she deferred this option at this time states she only wants to try to do it on her own at this point    I recommended echocardiogram as well as venous reflux studies lower extremity given chronic leg swelling she " refused states she is going to talk to cardiology to review this first    Patient deferred physical therapy referral for parathoracic muscle type pain    Walked into room to see patient 1114 am she was frustrated with waiting I advised her within 15 minutes of visit is within standard of care and is still within reasonable time to be seen--   Assessment/Plan   Diagnoses and all orders for this visit:  Gastroesophageal reflux disease, unspecified whether esophagitis present  -     pantoprazole (ProtoNix) 40 mg EC tablet; Take 1 tablet (40 mg) by mouth once daily. Do not crush, chew, or split.  Type 2 diabetes mellitus without complication, unspecified whether long term insulin use  -     empagliflozin (Jardiance) 25 mg tablet; Take 1 tablet (25 mg) by mouth once daily.  -     repaglinide (Prandin) 1 mg tablet; 1mg TID with meals  Hypothyroidism, unspecified type  -     Synthroid 88 mcg tablet; Take 1 tablet (88 mcg) by mouth early in the morning.. Take on an empty stomach at the same time each day, either 30 to 60 minutes prior to breakfast  Thrush  -     nystatin (Mycostatin) 100,000 unit/mL suspension; Take 5 mL (500,000 Units) by mouth 4 times a day. Swish in mouth and swallow.          Call and follow-up with medical spine as ordered    Call and follow-up with vascular recommendations noted Plavix rosuvastatin and Zetia follow-up in 2 months carotid duplex    Follow-up with your new cardiologist recommendations noted spironolactone clinical pharmacy follow-up labs in 2 months    Follow-up with endocrinology as planned in August  Low sugar diet goal A1c less than 7%      Go to ER for any worsening head pain headaches or other concerning symptoms    Call and follow-up with pulmonary medicine    Call follow-up with ENT    Call to complete colonoscopy    Please sign medical record request for Dr. Fitch orthopedics at the  as we discussed      Screening blood work due July 2026    Thank you for making  appointment today Mare    Please stop at the  to schedule follow-up 3 months as we discussed    Griffin Baltazar DO, MARY Baltazar DO

## 2025-06-02 ENCOUNTER — APPOINTMENT (OUTPATIENT)
Dept: OTOLARYNGOLOGY | Facility: CLINIC | Age: 75
End: 2025-06-02
Payer: MEDICARE

## 2025-06-02 ENCOUNTER — HOSPITAL ENCOUNTER (OUTPATIENT)
Dept: VASCULAR MEDICINE | Facility: HOSPITAL | Age: 75
Discharge: HOME | End: 2025-06-02
Payer: MEDICARE

## 2025-06-02 ENCOUNTER — OFFICE VISIT (OUTPATIENT)
Dept: VASCULAR SURGERY | Facility: HOSPITAL | Age: 75
End: 2025-06-02
Payer: MEDICARE

## 2025-06-02 VITALS
OXYGEN SATURATION: 95 % | HEART RATE: 60 BPM | SYSTOLIC BLOOD PRESSURE: 147 MMHG | WEIGHT: 161 LBS | BODY MASS INDEX: 25.27 KG/M2 | HEIGHT: 67 IN | DIASTOLIC BLOOD PRESSURE: 69 MMHG

## 2025-06-02 DIAGNOSIS — I10 ESSENTIAL HYPERTENSION: Primary | ICD-10-CM

## 2025-06-02 DIAGNOSIS — I65.23 OCCLUSION AND STENOSIS OF BILATERAL CAROTID ARTERIES: ICD-10-CM

## 2025-06-02 DIAGNOSIS — E78.2 MIXED HYPERLIPIDEMIA: ICD-10-CM

## 2025-06-02 DIAGNOSIS — I65.23 BILATERAL CAROTID ARTERY STENOSIS: Primary | ICD-10-CM

## 2025-06-02 DIAGNOSIS — I65.22 CAROTID STENOSIS, ASYMPTOMATIC, LEFT: ICD-10-CM

## 2025-06-02 LAB — BODY SURFACE AREA: 1.86 M2

## 2025-06-02 PROCEDURE — 93880 EXTRACRANIAL BILAT STUDY: CPT | Performed by: INTERNAL MEDICINE

## 2025-06-02 PROCEDURE — 3078F DIAST BP <80 MM HG: CPT | Performed by: SURGERY

## 2025-06-02 PROCEDURE — 3048F LDL-C <100 MG/DL: CPT | Performed by: SURGERY

## 2025-06-02 PROCEDURE — 3075F SYST BP GE 130 - 139MM HG: CPT | Performed by: SURGERY

## 2025-06-02 PROCEDURE — 99214 OFFICE O/P EST MOD 30 MIN: CPT | Performed by: SURGERY

## 2025-06-02 PROCEDURE — 3008F BODY MASS INDEX DOCD: CPT | Performed by: SURGERY

## 2025-06-02 PROCEDURE — 1159F MED LIST DOCD IN RCRD: CPT | Performed by: SURGERY

## 2025-06-02 PROCEDURE — 3052F HG A1C>EQUAL 8.0%<EQUAL 9.0%: CPT | Performed by: SURGERY

## 2025-06-02 PROCEDURE — 93880 EXTRACRANIAL BILAT STUDY: CPT

## 2025-06-02 RX ORDER — SPIRONOLACTONE 25 MG/1
12.5 TABLET ORAL DAILY
Qty: 45 TABLET | Refills: 3 | Status: SHIPPED | OUTPATIENT
Start: 2025-06-02 | End: 2026-06-02

## 2025-06-02 RX ORDER — ROSUVASTATIN CALCIUM 40 MG/1
40 TABLET, COATED ORAL DAILY
Qty: 90 TABLET | Refills: 3 | Status: SHIPPED | OUTPATIENT
Start: 2025-06-02

## 2025-06-02 NOTE — PROGRESS NOTES
Vascular Surgery Consult/Clinic Note    CC: Follow up     HPI:  Mare Ching is 74 y.o. female with history of HTN, CAD s/p PCI, DM2, COPD, carotid stenosis s/p R CEA (9/5/24).  Denies any complaints.   No CVA or TIA-like symptoms.   She returns today with carotid duplex.   Takes plavix, zetia and Crestor    Meds:   Medications Ordered Prior to Encounter[1]     Allergies:   RX Allergies[2]    SH:    Social Drivers of Health     Tobacco Use: Medium Risk (5/20/2025)    Patient History     Smoking Tobacco Use: Former     Smokeless Tobacco Use: Never     Passive Exposure: Not on file   Alcohol Use: Not At Risk (5/14/2024)    AUDIT-C     Frequency of Alcohol Consumption: Never     Average Number of Drinks: Patient does not drink     Frequency of Binge Drinking: Never   Financial Resource Strain: Not on file   Food Insecurity: Not on file   Transportation Needs: Not on file   Physical Activity: Not on file   Stress: Not on file   Social Connections: Not on file   Intimate Partner Violence: Not on file   Depression: Not at risk (2/4/2025)    PHQ-2     PHQ-2 Score: 0   Housing Stability: Not on file   Utilities: Not on file   Digital Equity: Not on file   Health Literacy: Not on file        FH:  Family History[3]     Objective:  Vitals:  Vitals:    06/02/25 1133   BP: 147/69   Pulse:    SpO2:         Exam:  Gen: in NAD  GI: Soft, ND/NT  Ext:  Right neck incision c/d/I.   BUE/BLE 5/5 motor str.     Imaging:  Carotid duplex (6/2/2025) independently reviewed.   R ICA without flow.   L ICA with 50-69% stenosis.     Carotid duplex (12/2/2024) independently reviewed.   R ICA patent without flow limiting stenosis.   L ICA with 50-69% stenosis.     Assessment & Plan:  Mare Ching is 74 y.o. female s/p R CEA, and Asx. L ICA stenosis.    - Doing well. Cont. Plavix and statin therapy.    - R ICA without flow despite widely patent duplex back in 12/2024; this suggest intimal hyperplasia.    - No neuro sx. Cont. Best  medical therapy: plavix and statin therapy.    - Follow up in 6 months with carotid duplex for surveillance.     Chris Leonard MD, PhD  Clinical   [employer] [facility] [employer]  Co-Director, Aortic Center  [facility] [place]             [1]   Current Outpatient Medications on File Prior to Visit   Medication Sig Dispense Refill    cholecalciferol (Vitamin D-3) 25 MCG (1000 UT) capsule TAKE 1 CAPSULE (25 MCG) BY MOUTH ONCE DAILY.  NEEDS APPOINTMENT 90 capsule 0    clopidogrel (Plavix) 75 mg tablet Take 1 tablet (75 mg) by mouth once daily. 90 tablet 3    empagliflozin (Jardiance) 25 mg tablet Take 1 tablet (25 mg) by mouth once daily. 30 tablet 5    metoprolol succinate XL (Toprol-XL) 25 mg 24 hr tablet TAKE ONE TABLET BY MOUTH ONCE DAILY. DO NOT CRUSH OR CHEW. 90 tablet 3    pantoprazole (ProtoNix) 40 mg EC tablet Take 1 tablet (40 mg) by mouth once daily. Do not crush, chew, or split. 90 tablet 1    repaglinide (Prandin) 1 mg tablet 1mg TID with meals      rosuvastatin (Crestor) 40 mg tablet TAKE ONE TABLET BY MOUTH EVERY DAY 90 tablet 3    Spiriva Respimat 2.5 mcg/actuation inhaler Inhale 1 puff 2 times a day.      spironolactone (Aldactone) 25 mg tablet Take 0.5 tablets (12.5 mg) by mouth once daily. 45 tablet 3    Symbicort 160-4.5 mcg/actuation inhaler Inhale 2 puffs 2 times a day.      Synthroid 88 mcg tablet Take 1 tablet (88 mcg) by mouth early in the morning.. Take on an empty stomach at the same time each day, either 30 to 60 minutes prior to breakfast 90 tablet 1    Ventolin HFA 90 mcg/actuation inhaler Inhales as needed      blood sugar diagnostic (Accu-Chek Guide test strips) strip use to test blood sugar three times daily 100 strip 2    blood-glucose meter misc As instructed 1 each 0    ipratropium-albuteroL (Duo-Neb) 0.5-2.5 mg/3 mL nebulizer solution       lancets misc Use 3 times daily as directed      nystatin  (Mycostatin) 100,000 unit/mL suspension Take 5 mL (500,000 Units) by mouth 4 times a day. Swish in mouth and swallow. 280 mL 1     No current facility-administered medications on file prior to visit.   [2]   Allergies  Allergen Reactions    Adhesive Tape-Silicones Other     Patient gets blisters from surgical tape   [3]   Family History  Problem Relation Name Age of Onset    Cancer Mother      Diabetes Mother      Pancreatic cancer Mother      Stroke Father          recurrent    Cancer Father      Other (urinary cancer) Father      Other (cardiac disorder) Other

## 2025-06-03 DIAGNOSIS — E11.9 TYPE 2 DIABETES MELLITUS WITHOUT COMPLICATION, WITHOUT LONG-TERM CURRENT USE OF INSULIN: ICD-10-CM

## 2025-06-03 RX ORDER — BLOOD SUGAR DIAGNOSTIC
STRIP MISCELLANEOUS
Qty: 100 STRIP | Refills: 0 | Status: SHIPPED | OUTPATIENT
Start: 2025-06-03

## 2025-07-02 ENCOUNTER — TELEPHONE (OUTPATIENT)
Dept: CARDIOLOGY | Facility: HOSPITAL | Age: 75
End: 2025-07-02
Payer: MEDICARE

## 2025-07-02 DIAGNOSIS — I10 ESSENTIAL HYPERTENSION: Primary | ICD-10-CM

## 2025-07-02 RX ORDER — METOPROLOL SUCCINATE 25 MG/1
25 TABLET, EXTENDED RELEASE ORAL DAILY
Qty: 90 TABLET | Refills: 3 | Status: SHIPPED | OUTPATIENT
Start: 2025-07-02 | End: 2026-07-02

## 2025-07-02 NOTE — TELEPHONE ENCOUNTER
Patient left a message requesting a refill on her metoprolol 25 mg taken once daily. She would like it to be sent in to the [facility] in [place].    Thank you.

## 2025-08-26 ENCOUNTER — APPOINTMENT (OUTPATIENT)
Dept: PRIMARY CARE | Facility: CLINIC | Age: 75
End: 2025-08-26
Payer: MEDICARE

## 2025-08-26 VITALS — SYSTOLIC BLOOD PRESSURE: 128 MMHG | BODY MASS INDEX: 25.53 KG/M2 | DIASTOLIC BLOOD PRESSURE: 72 MMHG | WEIGHT: 163 LBS

## 2025-08-26 DIAGNOSIS — E55.9 VITAMIN D DEFICIENCY: Primary | ICD-10-CM

## 2025-08-26 DIAGNOSIS — Z00.00 HEALTHCARE MAINTENANCE: ICD-10-CM

## 2025-08-26 DIAGNOSIS — E03.9 HYPOTHYROIDISM, UNSPECIFIED TYPE: ICD-10-CM

## 2025-08-26 DIAGNOSIS — E11.9 TYPE 2 DIABETES MELLITUS WITHOUT COMPLICATION, UNSPECIFIED WHETHER LONG TERM INSULIN USE: ICD-10-CM

## 2025-08-26 LAB
25(OH)D3+25(OH)D2 SERPL-MCNC: 78 NG/ML (ref 30–100)
ALBUMIN SERPL-MCNC: 5 G/DL (ref 3.6–5.1)
ALP SERPL-CCNC: 49 U/L (ref 37–153)
ALT SERPL-CCNC: 21 U/L (ref 6–29)
ANION GAP SERPL CALCULATED.4IONS-SCNC: 14 MMOL/L (CALC) (ref 7–17)
AST SERPL-CCNC: 15 U/L (ref 10–35)
BASOPHILS # BLD AUTO: 40 CELLS/UL (ref 0–200)
BASOPHILS NFR BLD AUTO: 0.5 %
BILIRUB SERPL-MCNC: 0.6 MG/DL (ref 0.2–1.2)
BUN SERPL-MCNC: 22 MG/DL (ref 7–25)
CALCIUM SERPL-MCNC: 10.7 MG/DL (ref 8.6–10.4)
CHLORIDE SERPL-SCNC: 101 MMOL/L (ref 98–110)
CHOLEST SERPL-MCNC: 170 MG/DL
CHOLEST/HDLC SERPL: 2.7 (CALC)
CO2 SERPL-SCNC: 23 MMOL/L (ref 20–32)
CREAT SERPL-MCNC: 0.92 MG/DL (ref 0.6–1)
EGFRCR SERPLBLD CKD-EPI 2021: 65 ML/MIN/1.73M2
EOSINOPHIL # BLD AUTO: 150 CELLS/UL (ref 15–500)
EOSINOPHIL NFR BLD AUTO: 1.9 %
ERYTHROCYTE [DISTWIDTH] IN BLOOD BY AUTOMATED COUNT: 13.8 % (ref 11–15)
GLUCOSE SERPL-MCNC: 139 MG/DL (ref 65–99)
HCT VFR BLD AUTO: 44.4 % (ref 35–45)
HDLC SERPL-MCNC: 62 MG/DL
HGB BLD-MCNC: 14.4 G/DL (ref 11.7–15.5)
LDLC SERPL CALC-MCNC: 87 MG/DL (CALC)
LYMPHOCYTES # BLD AUTO: 1991 CELLS/UL (ref 850–3900)
LYMPHOCYTES NFR BLD AUTO: 25.2 %
MCH RBC QN AUTO: 28.6 PG (ref 27–33)
MCHC RBC AUTO-ENTMCNC: 32.4 G/DL (ref 32–36)
MCV RBC AUTO: 88.1 FL (ref 80–100)
MONOCYTES # BLD AUTO: 577 CELLS/UL (ref 200–950)
MONOCYTES NFR BLD AUTO: 7.3 %
NEUTROPHILS # BLD AUTO: 5143 CELLS/UL (ref 1500–7800)
NEUTROPHILS NFR BLD AUTO: 65.1 %
NONHDLC SERPL-MCNC: 108 MG/DL (CALC)
PLATELET # BLD AUTO: 228 THOUSAND/UL (ref 140–400)
PMV BLD REES-ECKER: 10.4 FL (ref 7.5–12.5)
POTASSIUM SERPL-SCNC: 4.5 MMOL/L (ref 3.5–5.3)
PROT SERPL-MCNC: 7.7 G/DL (ref 6.1–8.1)
RBC # BLD AUTO: 5.04 MILLION/UL (ref 3.8–5.1)
SODIUM SERPL-SCNC: 138 MMOL/L (ref 135–146)
TRIGL SERPL-MCNC: 116 MG/DL
WBC # BLD AUTO: 7.9 THOUSAND/UL (ref 3.8–10.8)

## 2025-08-26 PROCEDURE — 1160F RVW MEDS BY RX/DR IN RCRD: CPT | Performed by: INTERNAL MEDICINE

## 2025-08-26 PROCEDURE — 99215 OFFICE O/P EST HI 40 MIN: CPT | Performed by: INTERNAL MEDICINE

## 2025-08-26 PROCEDURE — 3078F DIAST BP <80 MM HG: CPT | Performed by: INTERNAL MEDICINE

## 2025-08-26 PROCEDURE — G2211 COMPLEX E/M VISIT ADD ON: HCPCS | Performed by: INTERNAL MEDICINE

## 2025-08-26 PROCEDURE — 3074F SYST BP LT 130 MM HG: CPT | Performed by: INTERNAL MEDICINE

## 2025-08-26 PROCEDURE — 1159F MED LIST DOCD IN RCRD: CPT | Performed by: INTERNAL MEDICINE

## 2025-08-26 RX ORDER — LEVOTHYROXINE SODIUM 88 UG/1
88 TABLET ORAL DAILY
Qty: 90 TABLET | Refills: 1 | Status: SHIPPED | OUTPATIENT
Start: 2025-08-26 | End: 2025-11-24

## 2025-08-26 RX ORDER — VITAMIN K2 40 MCG
1 TABLET ORAL DAILY
Qty: 90 TABLET | Refills: 1 | Status: SHIPPED | OUTPATIENT
Start: 2025-08-26 | End: 2025-11-24

## 2025-08-26 RX ORDER — VIT C/E/ZN/COPPR/LUTEIN/ZEAXAN 250MG-90MG
25 CAPSULE ORAL DAILY
Qty: 90 CAPSULE | Refills: 1 | Status: SHIPPED | OUTPATIENT
Start: 2025-08-26 | End: 2025-11-24

## 2025-08-26 ASSESSMENT — PATIENT HEALTH QUESTIONNAIRE - PHQ9
SUM OF ALL RESPONSES TO PHQ9 QUESTIONS 1 AND 2: 0
1. LITTLE INTEREST OR PLEASURE IN DOING THINGS: NOT AT ALL
2. FEELING DOWN, DEPRESSED OR HOPELESS: NOT AT ALL

## 2025-09-02 ENCOUNTER — OFFICE VISIT (OUTPATIENT)
Dept: CARDIOLOGY | Facility: HOSPITAL | Age: 75
End: 2025-09-02
Payer: MEDICARE

## 2025-09-02 VITALS
WEIGHT: 166 LBS | BODY MASS INDEX: 26.06 KG/M2 | HEART RATE: 77 BPM | OXYGEN SATURATION: 96 % | HEIGHT: 67 IN | DIASTOLIC BLOOD PRESSURE: 76 MMHG | SYSTOLIC BLOOD PRESSURE: 156 MMHG

## 2025-09-02 DIAGNOSIS — I10 ESSENTIAL HYPERTENSION: ICD-10-CM

## 2025-09-02 DIAGNOSIS — I65.23 BILATERAL CAROTID ARTERY STENOSIS: ICD-10-CM

## 2025-09-02 DIAGNOSIS — I25.10 CAD IN NATIVE ARTERY: Primary | ICD-10-CM

## 2025-09-02 DIAGNOSIS — E11.9 TYPE 2 DIABETES MELLITUS WITHOUT COMPLICATION, WITHOUT LONG-TERM CURRENT USE OF INSULIN: ICD-10-CM

## 2025-09-02 DIAGNOSIS — I25.110 UNSTABLE ANGINA PECTORIS DUE TO CORONARY ARTERIOSCLEROSIS (MULTI): ICD-10-CM

## 2025-09-02 DIAGNOSIS — E08.59 DIABETES MELLITUS DUE TO UNDERLYING CONDITION WITH OTHER CIRCULATORY COMPLICATIONS: ICD-10-CM

## 2025-09-02 DIAGNOSIS — E78.00 PURE HYPERCHOLESTEROLEMIA: ICD-10-CM

## 2025-09-02 LAB
ATRIAL RATE: 77 BPM
P AXIS: 57 DEGREES
P OFFSET: 160 MS
P ONSET: 108 MS
PR INTERVAL: 196 MS
Q ONSET: 206 MS
QRS COUNT: 13 BEATS
QRS DURATION: 154 MS
QT INTERVAL: 450 MS
QTC CALCULATION(BAZETT): 509 MS
QTC FREDERICIA: 489 MS
R AXIS: -83 DEGREES
T AXIS: 42 DEGREES
T OFFSET: 431 MS
VENTRICULAR RATE: 77 BPM

## 2025-09-02 PROCEDURE — 3078F DIAST BP <80 MM HG: CPT | Performed by: INTERNAL MEDICINE

## 2025-09-02 PROCEDURE — 1159F MED LIST DOCD IN RCRD: CPT | Performed by: INTERNAL MEDICINE

## 2025-09-02 PROCEDURE — 3048F LDL-C <100 MG/DL: CPT | Performed by: INTERNAL MEDICINE

## 2025-09-02 PROCEDURE — 93005 ELECTROCARDIOGRAM TRACING: CPT | Performed by: INTERNAL MEDICINE

## 2025-09-02 PROCEDURE — 1036F TOBACCO NON-USER: CPT | Performed by: INTERNAL MEDICINE

## 2025-09-02 PROCEDURE — 3077F SYST BP >= 140 MM HG: CPT | Performed by: INTERNAL MEDICINE

## 2025-09-02 PROCEDURE — 3052F HG A1C>EQUAL 8.0%<EQUAL 9.0%: CPT | Performed by: INTERNAL MEDICINE

## 2025-09-02 PROCEDURE — G2211 COMPLEX E/M VISIT ADD ON: HCPCS | Performed by: INTERNAL MEDICINE

## 2025-09-02 PROCEDURE — 3008F BODY MASS INDEX DOCD: CPT | Performed by: INTERNAL MEDICINE

## 2025-09-02 PROCEDURE — 1160F RVW MEDS BY RX/DR IN RCRD: CPT | Performed by: INTERNAL MEDICINE

## 2025-09-02 PROCEDURE — 99212 OFFICE O/P EST SF 10 MIN: CPT

## 2025-09-02 PROCEDURE — 99215 OFFICE O/P EST HI 40 MIN: CPT | Performed by: INTERNAL MEDICINE

## 2025-09-02 RX ORDER — CLOPIDOGREL BISULFATE 75 MG/1
75 TABLET ORAL DAILY
Qty: 90 TABLET | Refills: 3 | Status: SHIPPED | OUTPATIENT
Start: 2025-09-02

## 2025-09-02 RX ORDER — METOPROLOL SUCCINATE 50 MG/1
50 TABLET, EXTENDED RELEASE ORAL DAILY
Qty: 90 TABLET | Refills: 3 | Status: SHIPPED | OUTPATIENT
Start: 2025-09-02 | End: 2026-09-02

## 2025-09-02 RX ORDER — SPIRONOLACTONE 25 MG/1
25 TABLET ORAL DAILY
Qty: 90 TABLET | Refills: 3 | Status: SHIPPED | OUTPATIENT
Start: 2025-09-02 | End: 2026-09-02

## 2025-09-04 ENCOUNTER — PREP FOR PROCEDURE (OUTPATIENT)
Dept: CARDIOLOGY | Facility: HOSPITAL | Age: 75
End: 2025-09-04
Payer: MEDICARE

## 2025-09-04 DIAGNOSIS — I25.10 CAD IN NATIVE ARTERY: Primary | ICD-10-CM

## 2025-09-04 DIAGNOSIS — I25.110 UNSTABLE ANGINA PECTORIS DUE TO CORONARY ARTERIOSCLEROSIS (MULTI): ICD-10-CM

## 2025-09-04 RX ORDER — SODIUM CHLORIDE 9 MG/ML
100 INJECTION, SOLUTION INTRAVENOUS CONTINUOUS
OUTPATIENT
Start: 2025-09-04 | End: 2025-09-04

## 2025-09-05 ENCOUNTER — TELEMEDICINE (OUTPATIENT)
Dept: PHARMACY | Facility: HOSPITAL | Age: 75
End: 2025-09-05
Payer: MEDICARE

## 2025-09-05 DIAGNOSIS — E78.00 PURE HYPERCHOLESTEROLEMIA: ICD-10-CM

## 2025-11-25 ENCOUNTER — APPOINTMENT (OUTPATIENT)
Dept: PRIMARY CARE | Facility: CLINIC | Age: 75
End: 2025-11-25
Payer: MEDICARE

## 2025-12-12 ENCOUNTER — APPOINTMENT (OUTPATIENT)
Dept: PHARMACY | Facility: HOSPITAL | Age: 75
End: 2025-12-12
Payer: MEDICARE

## (undated) DEVICE — SUTURE, SILK, 2-0, 30 IN, SH, BLACK

## (undated) DEVICE — SUTURE, VICRYL, 3-0, 27 IN, SH

## (undated) DEVICE — INTRODUCER, GLIDESHEATH SLENDER A-KIT, 6FR 10CM

## (undated) DEVICE — TUBING, CONTRAST INJECTION, 500PSI, 10IN, 25CM

## (undated) DEVICE — CONTAINER, SPECIMEN, 120 ML, STERILE

## (undated) DEVICE — MANIFOLD, 4 PORT NEPTUNE STANDARD

## (undated) DEVICE — GLOVE, SURGICAL, PROTEXIS PI MICRO, 7.0, PF, LF

## (undated) DEVICE — DRAPE, PAD, INSTRUMENT, MAGNETIC, MEDIUM, 10 X 16 IN, DISPOSABLE

## (undated) DEVICE — SUTURE, VICRYL, 2-0, 36 IN, CT-1, UNDYED

## (undated) DEVICE — KIT, TOURNIQUET, 7"

## (undated) DEVICE — SPONGE GAUZE, XRAY SC+RFID, 4X4 16 PLY, STERILE

## (undated) DEVICE — SPONGE, LAP, XRAY DECT, 18IN X 18IN, W/LOOP, STERILE

## (undated) DEVICE — CATHETER, DIAG, EXPO, 5FR 110CM, PIG 145

## (undated) DEVICE — WOUND SYSTEM, DEBRIDEMENT & CLEANING, O.R DUOPAK

## (undated) DEVICE — SUTURE, PROLENE, 7-0, 30 IN, BV1, DA, BLUE

## (undated) DEVICE — TAPE, UMBILICAL, 1/8 X 30 IN, MULTIPACK, COTTON, WHITE

## (undated) DEVICE — DRAPE, INCISE, ANTIMICROBIAL, IOBAN 2, LARGE, 17 X 23 IN, DISPOSABLE, STERILE

## (undated) DEVICE — TOWEL, OR XRAY, RFID DETECT, WHITE, 17X26, STERILE

## (undated) DEVICE — PROTECTOR, NERVE, ULNAR, PINK

## (undated) DEVICE — INSERT, CLAMP, SURGICAL, SOFT/TRACTION, STEALTH, 1 MM

## (undated) DEVICE — COVER, TABLE, 44 X 75 IN, DISPOSABLE, LF, STERILE

## (undated) DEVICE — BAND, VASCULAR, RADIAL HEMOSTAT, REGULAR 24CM

## (undated) DEVICE — GUIDEWIRE, PRESSURE WIRE X , 175CM WIRELESS, AGILE TIP

## (undated) DEVICE — TOWEL, OR, XRAY DETECT 5 PK, WHITE, 17X26, W/DMT TAG, ST

## (undated) DEVICE — Device

## (undated) DEVICE — DRAPE, SHEET, THYROID, W/ARMBOARD COVER, 100 X 121 IN, DISPOSABLE, LF, STERILE

## (undated) DEVICE — VALVE, HEMO, GUARDIAN II, W/GUIDEWIRE INSERTION TOOL & TORQUE

## (undated) DEVICE — CATHETER, OPTITORQUE, 5FR, TIG, 1H/100CM

## (undated) DEVICE — COVER, TABLE, UHC

## (undated) DEVICE — SEALANT, HEMOSTATIC, FLOSEAL, 10 ML

## (undated) DEVICE — SUTURE, PROLENE, 6-0, 30 IN, BV-1 BV-1

## (undated) DEVICE — COVER, CART, 45 X 27 X 48 IN, CLEAR

## (undated) DEVICE — GUIDEWIRE, INQWIRE, 3MM J, .035 X 210CM, FIXED

## (undated) DEVICE — SUTURE, MONOCRYL, 4-0, 18 IN, PS2, UNDYED

## (undated) DEVICE — CATHETER, GUIDING, LAUNCHER, 6FR EBU 3.5

## (undated) DEVICE — SPONGE, HEMOSTATIC, GELATIN, SURGIFOAM, 8 X 12.5 CM X 10 MM

## (undated) DEVICE — APPLICATOR, PREP, CHLORAPREP, W/ORANGE TINT, 10.5ML

## (undated) DEVICE — SPONGE, HEMOSTATIC, CELLULOSE, SURGICEL, 2 X 14 IN

## (undated) DEVICE — SUTURE, PROLENE, 5-0, 36 IN, C-1, CV-11, BLUE

## (undated) DEVICE — ADHESIVE, SKIN, LIQUIBAND EXCEED